# Patient Record
Sex: FEMALE | Race: WHITE | NOT HISPANIC OR LATINO
[De-identification: names, ages, dates, MRNs, and addresses within clinical notes are randomized per-mention and may not be internally consistent; named-entity substitution may affect disease eponyms.]

---

## 2017-03-31 ENCOUNTER — APPOINTMENT (OUTPATIENT)
Dept: ORTHOPEDIC SURGERY | Facility: CLINIC | Age: 82
End: 2017-03-31

## 2018-11-23 ENCOUNTER — TRANSCRIPTION ENCOUNTER (OUTPATIENT)
Age: 83
End: 2018-11-23

## 2019-03-08 ENCOUNTER — APPOINTMENT (OUTPATIENT)
Dept: ORTHOPEDIC SURGERY | Facility: CLINIC | Age: 84
End: 2019-03-08
Payer: MEDICARE

## 2019-03-08 VITALS — HEIGHT: 68 IN | WEIGHT: 195 LBS | BODY MASS INDEX: 29.55 KG/M2

## 2019-03-08 PROCEDURE — 20610 DRAIN/INJ JOINT/BURSA W/O US: CPT | Mod: RT

## 2019-03-08 PROCEDURE — 73562 X-RAY EXAM OF KNEE 3: CPT | Mod: RT

## 2019-03-08 PROCEDURE — 99214 OFFICE O/P EST MOD 30 MIN: CPT | Mod: 25

## 2020-02-28 ENCOUNTER — APPOINTMENT (OUTPATIENT)
Dept: ORTHOPEDIC SURGERY | Facility: CLINIC | Age: 85
End: 2020-02-28
Payer: MEDICARE

## 2020-02-28 VITALS — BODY MASS INDEX: 26.52 KG/M2 | HEIGHT: 68 IN | WEIGHT: 175 LBS

## 2020-02-28 VITALS — SYSTOLIC BLOOD PRESSURE: 162 MMHG | HEART RATE: 75 BPM | DIASTOLIC BLOOD PRESSURE: 81 MMHG

## 2020-02-28 DIAGNOSIS — M17.0 BILATERAL PRIMARY OSTEOARTHRITIS OF KNEE: ICD-10-CM

## 2020-02-28 PROCEDURE — 20610 DRAIN/INJ JOINT/BURSA W/O US: CPT | Mod: RT

## 2020-02-28 PROCEDURE — 99214 OFFICE O/P EST MOD 30 MIN: CPT | Mod: 25

## 2020-04-24 ENCOUNTER — APPOINTMENT (OUTPATIENT)
Dept: ORTHOPEDIC SURGERY | Facility: CLINIC | Age: 85
End: 2020-04-24

## 2022-02-18 ENCOUNTER — APPOINTMENT (OUTPATIENT)
Dept: GERIATRICS | Facility: CLINIC | Age: 87
End: 2022-02-18
Payer: MEDICARE

## 2022-02-18 ENCOUNTER — NON-APPOINTMENT (OUTPATIENT)
Age: 87
End: 2022-02-18

## 2022-02-18 VITALS
HEART RATE: 79 BPM | SYSTOLIC BLOOD PRESSURE: 130 MMHG | OXYGEN SATURATION: 97 % | DIASTOLIC BLOOD PRESSURE: 72 MMHG | BODY MASS INDEX: 24.59 KG/M2 | RESPIRATION RATE: 15 BRPM | WEIGHT: 153 LBS | HEIGHT: 66 IN | TEMPERATURE: 97.8 F

## 2022-02-18 DIAGNOSIS — Z13.820 ENCOUNTER FOR SCREENING FOR OSTEOPOROSIS: ICD-10-CM

## 2022-02-18 DIAGNOSIS — E53.8 DEFICIENCY OF OTHER SPECIFIED B GROUP VITAMINS: ICD-10-CM

## 2022-02-18 DIAGNOSIS — Z23 ENCOUNTER FOR IMMUNIZATION: ICD-10-CM

## 2022-02-18 DIAGNOSIS — E55.9 VITAMIN D DEFICIENCY, UNSPECIFIED: ICD-10-CM

## 2022-02-18 PROCEDURE — 99204 OFFICE O/P NEW MOD 45 MIN: CPT

## 2022-02-18 RX ORDER — RIVAROXABAN 20 MG/1
20 TABLET, FILM COATED ORAL
Qty: 30 | Refills: 2 | Status: ACTIVE | COMMUNITY
Start: 2022-02-18

## 2022-02-18 NOTE — HISTORY OF PRESENT ILLNESS
[FreeTextEntry1] : 86yof with pmhx of chb s/p ppm, chf, carotid artery stenosis, diabetes (diet controlle), hypothyroidism, OA of knees, afib who is seen for initial visit with her son and caregiver\par \par has HHA for morning \par lives with son\par \par needs assistance with medications \par tolieting, showering, dressing\par walks with walker and cane and has wheelchair for longer distances\par no recent falls\par \par worse strength and balance\par \par follows with cardiologist as Pcp\par \par no skin rashes\par \par urinary incontinence\par \par \par 1 son who is HCP: they have forms at home\par also has living will- but did not go into ACP discussion yet\par \par mother with stroke\par father with heart disease\par \par normal CBC last month\par normal TSH and t4\par 
119

## 2022-02-18 NOTE — PHYSICAL EXAM
[Alert] : alert [No Acute Distress] : in no acute distress [Sclera] : the sclera and conjunctiva were normal [EOMI] : extraocular movements were intact [No Neck Mass] : no neck mass was observed [Supple] : the neck was supple [Normal] : no respiratory distress, no accessory muscle use, normal respiratory rhythm and effort, lungs were clear to auscultation bilaterally [Normal S1, S2] : normal S1 and S2 [Murmurs] : no murmurs [Heart Rate And Rhythm] : heart rate was normal and rhythm regular [Edema] : edema was not present [Normal Appearance] : normal in appearance [Bowel Sounds] : normal bowel sounds [Abdomen Tenderness] : non-tender [Abdomen Soft] : soft [Cervical Lymph Nodes Enlarged Posterior Bilaterally] : posterior cervical [Supraclavicular Lymph Nodes Enlarged Bilaterally] : supraclavicular [Cervical Lymph Nodes Enlarged Anterior Bilaterally] : anterior cervical, supraclavicular [No Spinal Tenderness] : no spinal tenderness [Involuntary Movements] : no involuntary movements were seen [No Focal Deficits] : no focal deficits [Normal Affect] : the affect was normal [Normal Mood] : the mood was normal [de-identified] : impacted cerumen [de-identified] : varicose veins [de-identified] : PPM left chest wall [de-identified] : multiple attempts to stand, poor control with sitting, unsteady gait, needing 1 person assist [de-identified] : venous stasis changes

## 2022-02-18 NOTE — ASSESSMENT
[FreeTextEntry1] : vit D def:  repeat level\par check dexa (last 2014)\par no hx of fractures\par \par chf/afib/ PPM\par follows with cards\par euvolemic today\par c/w spironolactone, carvediolol and lasix\par \par hld: check lipids\par dm2: diet controlled last a1c 2022 6.1\par \par hx of folic acid def: check levels\par unsteady gait: PT referral ,  fall precautions\par \par f/u in july with memory testing

## 2022-02-18 NOTE — REVIEW OF SYSTEMS
[Fever] : no fever [Eyesight Problems] : no eyesight problems [Shortness Of Breath] : no shortness of breath [Cough] : no cough [SOB on Exertion] : no shortness of breath during exertion [Incontinence] : incontinence [As Noted in HPI] : as noted in HPI [Negative] : Gastrointestinal

## 2022-02-28 LAB
25(OH)D3 SERPL-MCNC: 54.9 NG/ML
ALBUMIN SERPL ELPH-MCNC: 4.3 G/DL
ALP BLD-CCNC: 103 U/L
ALT SERPL-CCNC: 16 U/L
ANION GAP SERPL CALC-SCNC: 12 MMOL/L
AST SERPL-CCNC: 19 U/L
BILIRUB SERPL-MCNC: 0.6 MG/DL
BUN SERPL-MCNC: 16 MG/DL
CALCIUM SERPL-MCNC: 10.4 MG/DL
CHLORIDE SERPL-SCNC: 101 MMOL/L
CHOLEST SERPL-MCNC: 197 MG/DL
CO2 SERPL-SCNC: 26 MMOL/L
CREAT SERPL-MCNC: 0.57 MG/DL
FOLATE SERPL-MCNC: >20 NG/ML
GLUCOSE SERPL-MCNC: 122 MG/DL
HDLC SERPL-MCNC: 49 MG/DL
LDLC SERPL CALC-MCNC: 135 MG/DL
NONHDLC SERPL-MCNC: 149 MG/DL
POTASSIUM SERPL-SCNC: 4.3 MMOL/L
PROT SERPL-MCNC: 7.1 G/DL
SODIUM SERPL-SCNC: 139 MMOL/L
TRIGL SERPL-MCNC: 69 MG/DL
VIT B12 SERPL-MCNC: 633 PG/ML

## 2022-04-04 LAB
ALBUMIN SERPL ELPH-MCNC: 4.3 G/DL
ALP BLD-CCNC: 101 U/L
ALT SERPL-CCNC: 13 U/L
ANION GAP SERPL CALC-SCNC: 11 MMOL/L
AST SERPL-CCNC: 17 U/L
BASOPHILS # BLD AUTO: 0.05 K/UL
BASOPHILS NFR BLD AUTO: 0.7 %
BILIRUB SERPL-MCNC: 0.6 MG/DL
BUN SERPL-MCNC: 13 MG/DL
CALCIUM SERPL-MCNC: 10.5 MG/DL
CHLORIDE SERPL-SCNC: 101 MMOL/L
CO2 SERPL-SCNC: 28 MMOL/L
CREAT SERPL-MCNC: 0.58 MG/DL
EGFR: 88 ML/MIN/1.73M2
EOSINOPHIL # BLD AUTO: 0.1 K/UL
EOSINOPHIL NFR BLD AUTO: 1.5 %
ESTIMATED AVERAGE GLUCOSE: 134 MG/DL
GLUCOSE SERPL-MCNC: 114 MG/DL
HBA1C MFR BLD HPLC: 6.3 %
HCT VFR BLD CALC: 44.9 %
HGB BLD-MCNC: 14.5 G/DL
IMM GRANULOCYTES NFR BLD AUTO: 1 %
LYMPHOCYTES # BLD AUTO: 1.21 K/UL
LYMPHOCYTES NFR BLD AUTO: 18 %
MAN DIFF?: NORMAL
MCHC RBC-ENTMCNC: 29.6 PG
MCHC RBC-ENTMCNC: 32.3 GM/DL
MCV RBC AUTO: 91.6 FL
MONOCYTES # BLD AUTO: 0.32 K/UL
MONOCYTES NFR BLD AUTO: 4.8 %
NEUTROPHILS # BLD AUTO: 4.97 K/UL
NEUTROPHILS NFR BLD AUTO: 74 %
PLATELET # BLD AUTO: 213 K/UL
POTASSIUM SERPL-SCNC: 4.3 MMOL/L
PROT SERPL-MCNC: 6.9 G/DL
RBC # BLD: 4.9 M/UL
RBC # FLD: 13.2 %
SODIUM SERPL-SCNC: 140 MMOL/L
TSH SERPL-ACNC: 1.23 UIU/ML
WBC # FLD AUTO: 6.72 K/UL

## 2022-04-08 ENCOUNTER — APPOINTMENT (OUTPATIENT)
Dept: GERIATRICS | Facility: CLINIC | Age: 87
End: 2022-04-08
Payer: MEDICARE

## 2022-04-08 VITALS
DIASTOLIC BLOOD PRESSURE: 80 MMHG | TEMPERATURE: 98 F | HEART RATE: 76 BPM | RESPIRATION RATE: 16 BRPM | BODY MASS INDEX: 23.95 KG/M2 | WEIGHT: 149 LBS | HEIGHT: 66 IN | OXYGEN SATURATION: 97 % | SYSTOLIC BLOOD PRESSURE: 138 MMHG

## 2022-04-08 DIAGNOSIS — Z95.0 PRESENCE OF CARDIAC PACEMAKER: ICD-10-CM

## 2022-04-08 PROCEDURE — 99214 OFFICE O/P EST MOD 30 MIN: CPT

## 2022-04-08 NOTE — HISTORY OF PRESENT ILLNESS
[FreeTextEntry1] : story of Present Illness\par 86yof with pmhx of chb s/p ppm, chf, carotid artery stenosis, diabetes (diet controlle), hypothyroidism, OA of knees, afib who is seen for initial visit with her son and caregiver\par \par has HHA for morning \par lives with son\par \par needs assistance with medications \par tolieting, showering, dressing\par walks with walker and cane and has wheelchair for longer distances\par no recent falls\par \par now working with PT twice weekly\par \par on home scale was large change in weight, but in office has had 4lb weight loss\par diet discussed-  very healthy diet with lots of veggies \par no ab pain, nausea, melena.\par \par labwork reviewed

## 2022-04-08 NOTE — PHYSICAL EXAM
[Normal] : alert, in no acute distress [Sclera] : the sclera and conjunctiva were normal [EOMI] : extraocular movements were intact [Normal Outer Ear/Nose] : the ears and nose were normal in appearance [Normal Appearance] : the appearance of the neck was normal [No Respiratory Distress] : no respiratory distress [No Acc Muscle Use] : no accessory muscle use [Respiration, Rhythm And Depth] : normal respiratory rhythm and effort [Auscultation Breath Sounds / Voice Sounds] : lungs were clear to auscultation bilaterally [Normal S1, S2] : normal S1 and S2 [Heart Rate And Rhythm] : heart rate was normal and rhythm regular [Edema] : edema was not present [Abdomen Tenderness] : non-tender [Abdomen Soft] : soft [No Clubbing, Cyanosis] : no clubbing or cyanosis of the fingernails [Involuntary Movements] : no involuntary movements were seen [Normal Color / Pigmentation] : normal skin color and pigmentation [No Focal Deficits] : no focal deficits [Normal Affect] : the affect was normal [Normal Mood] : the mood was normal [de-identified] : varicose veins [de-identified] : PPM to left chest

## 2022-04-08 NOTE — ASSESSMENT
[FreeTextEntry1] : dm2: diet controlled\par hld: diet controlled\par \par chf/afib/ PPM\par follows with cards\par euvolemic today\par c/w spironolactone, carvediolol and lasix\par \par f/u summer for memory testing\par

## 2022-04-08 NOTE — REVIEW OF SYSTEMS
Asthma    Bipolar Disorder    Borderline Personality Disorder    Cholelithiasis    Depression    Fibroids    GERD (Gastroesophageal Reflux Disease)    Obesity [Fever] : no fever [Cough] : no cough [Abdominal Pain] : no abdominal pain

## 2022-06-17 ENCOUNTER — APPOINTMENT (OUTPATIENT)
Dept: GERIATRICS | Facility: CLINIC | Age: 87
End: 2022-06-17
Payer: MEDICARE

## 2022-06-17 VITALS
SYSTOLIC BLOOD PRESSURE: 132 MMHG | TEMPERATURE: 97.7 F | DIASTOLIC BLOOD PRESSURE: 80 MMHG | WEIGHT: 145 LBS | OXYGEN SATURATION: 99 % | BODY MASS INDEX: 23.4 KG/M2 | HEART RATE: 66 BPM | RESPIRATION RATE: 15 BRPM

## 2022-06-17 PROCEDURE — 99214 OFFICE O/P EST MOD 30 MIN: CPT

## 2022-06-17 NOTE — PHYSICAL EXAM
[Alert] : alert [Sclera] : the sclera and conjunctiva were normal [EOMI] : extraocular movements were intact [Normal Outer Ear/Nose] : the ears and nose were normal in appearance [No Neck Mass] : no neck mass was observed [Supple] : the neck was supple [Normal] : no respiratory distress, no accessory muscle use, normal respiratory rhythm and effort, lungs were clear to auscultation bilaterally [Normal S1, S2] : normal S1 and S2 [Murmurs] : no murmurs [Heart Rate And Rhythm] : heart rate was normal and rhythm regular [Edema] : edema was not present [Normal Appearance] : normal in appearance [Bowel Sounds] : normal bowel sounds [Abdomen Tenderness] : non-tender [Abdomen Soft] : soft [Cervical Lymph Nodes Enlarged Posterior Bilaterally] : posterior cervical [Supraclavicular Lymph Nodes Enlarged Bilaterally] : supraclavicular [Cervical Lymph Nodes Enlarged Anterior Bilaterally] : anterior cervical, supraclavicular [No Spinal Tenderness] : no spinal tenderness [Involuntary Movements] : no involuntary movements were seen [No Focal Deficits] : no focal deficits [Normal Affect] : the affect was normal [Normal Mood] : the mood was normal [de-identified] : PPM left chest wall [de-identified] : varicose veins [de-identified] : walker [de-identified] : venous stasis changes

## 2022-06-17 NOTE — PHYSICAL EXAM
[Alert] : alert [Sclera] : the sclera and conjunctiva were normal [EOMI] : extraocular movements were intact [Normal Outer Ear/Nose] : the ears and nose were normal in appearance [No Neck Mass] : no neck mass was observed [Supple] : the neck was supple [Normal] : no respiratory distress, no accessory muscle use, normal respiratory rhythm and effort, lungs were clear to auscultation bilaterally [Normal S1, S2] : normal S1 and S2 [Murmurs] : no murmurs [Heart Rate And Rhythm] : heart rate was normal and rhythm regular [Edema] : edema was not present [Normal Appearance] : normal in appearance [Bowel Sounds] : normal bowel sounds [Abdomen Tenderness] : non-tender [Abdomen Soft] : soft [Cervical Lymph Nodes Enlarged Posterior Bilaterally] : posterior cervical [Supraclavicular Lymph Nodes Enlarged Bilaterally] : supraclavicular [Cervical Lymph Nodes Enlarged Anterior Bilaterally] : anterior cervical, supraclavicular [No Spinal Tenderness] : no spinal tenderness [Involuntary Movements] : no involuntary movements were seen [No Focal Deficits] : no focal deficits [Normal Affect] : the affect was normal [Normal Mood] : the mood was normal [de-identified] : PPM left chest wall [de-identified] : varicose veins [de-identified] : walker [de-identified] : venous stasis changes

## 2022-06-17 NOTE — HISTORY OF PRESENT ILLNESS
[FreeTextEntry1] : 86yof with pmhx of chb s/p ppm, chf, carotid artery stenosis, diabetes (diet controlle), hypothyroidism, OA of knees, afib who is seen for initial visit with her son and caregiver\par \par Dementia: \par SLUMs 9/30 6/2022\par CTH : pending\par normal mood, sleeping okay, good appetite\par \par \par has HHA for morning \par lives with son\par \par needs assistance with medications  and ADL's\par walks with walker and cane and has wheelchair for longer distances\par no recent falls\par \par working with PT\par \par follows with cardiologist as Pcp\par \par no skin rashes\par \par urinary incontinence using A&D\par \par \par 1 son who is HCP: they have forms at home\par also has living will- but did not go into ACP discussion yet\par \par \par

## 2022-06-17 NOTE — ASSESSMENT
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

## 2022-06-21 ENCOUNTER — LABORATORY RESULT (OUTPATIENT)
Age: 87
End: 2022-06-21

## 2022-06-21 LAB
APPEARANCE: CLEAR
BILIRUBIN URINE: NEGATIVE
BLOOD URINE: NEGATIVE
COLOR: NORMAL
GLUCOSE QUALITATIVE U: NEGATIVE
KETONES URINE: ABNORMAL
LEUKOCYTE ESTERASE URINE: ABNORMAL
NITRITE URINE: NEGATIVE
PH URINE: 6
PROTEIN URINE: NEGATIVE
SPECIFIC GRAVITY URINE: 1.01
UROBILINOGEN URINE: NORMAL

## 2022-06-23 ENCOUNTER — APPOINTMENT (OUTPATIENT)
Dept: CT IMAGING | Facility: IMAGING CENTER | Age: 87
End: 2022-06-23
Payer: MEDICARE

## 2022-06-23 ENCOUNTER — OUTPATIENT (OUTPATIENT)
Dept: OUTPATIENT SERVICES | Facility: HOSPITAL | Age: 87
LOS: 1 days | End: 2022-06-23
Payer: MEDICARE

## 2022-06-23 DIAGNOSIS — R41.3 OTHER AMNESIA: ICD-10-CM

## 2022-06-23 PROCEDURE — 70450 CT HEAD/BRAIN W/O DYE: CPT | Mod: 26,MH

## 2022-06-23 PROCEDURE — 70450 CT HEAD/BRAIN W/O DYE: CPT

## 2022-09-02 ENCOUNTER — APPOINTMENT (OUTPATIENT)
Dept: GERIATRICS | Facility: CLINIC | Age: 87
End: 2022-09-02

## 2022-09-02 VITALS
RESPIRATION RATE: 16 BRPM | BODY MASS INDEX: 21.41 KG/M2 | HEIGHT: 67.2 IN | OXYGEN SATURATION: 99 % | HEART RATE: 66 BPM | DIASTOLIC BLOOD PRESSURE: 70 MMHG | WEIGHT: 138 LBS | SYSTOLIC BLOOD PRESSURE: 122 MMHG

## 2022-09-02 PROCEDURE — 99214 OFFICE O/P EST MOD 30 MIN: CPT

## 2022-09-02 NOTE — HISTORY OF PRESENT ILLNESS
[FreeTextEntry1] : 87yof with pmhx of chb s/p ppm, chf, carotid artery stenosis, diabetes (diet controlled), hypothyroidism, OA of knees, afib who is seen for initial visit with her son and caregiver\par \par home bp raedings with some elevations\par adherent with medications\par \par Dementia: \par SLUMs 9/30 6/2022\par has aide during day, now also on weekends\par episode of getting up in middle of night disoriented and turned on stove to make tea-\par overall though sleeping well without major sleep disturbances or behaviorial issues\par \par has HHA \par lives with son\par \par needs assistance with medications  and ADL's\par walks with walker and cane and has wheelchair for longer distances\par no recent falls\par \par working with PT\par \par no skin rashes\par \par urinary incontinence using A&D\par \par \par 1 son who is HCP: they have forms at home\par also has living will- but did not go into ACP discussion yet\par \par \par

## 2022-09-02 NOTE — ASSESSMENT
[FreeTextEntry1] : htn:\par home bp readings with some elevations\par today controlled\par advised if home bp readings elevated to increase carvedilool to 6.25mg BID\par \par memory loss:\par suspect alz dementia\par 9/30 SLUMs 6/2022\par c/w home supportive care\par needs assistance with ADL's and IADL;s\par son will be going on trip out of country- asking about respite care- referral to \par \par c/w PT  at home\par \par dm2: diet controlled\par hld: diet controlled\par \par chf/afib/ PPM\par stable\par euvolemic today\par c/w spironolactone, carvediolol and lasix\par \par f/u 3 months-  dementia -\par \par \par

## 2022-09-02 NOTE — PHYSICAL EXAM
[Alert] : alert [Sclera] : the sclera and conjunctiva were normal [EOMI] : extraocular movements were intact [Normal Outer Ear/Nose] : the ears and nose were normal in appearance [No Neck Mass] : no neck mass was observed [Supple] : the neck was supple [Normal] : no respiratory distress, no accessory muscle use, normal respiratory rhythm and effort, lungs were clear to auscultation bilaterally [Normal S1, S2] : normal S1 and S2 [Murmurs] : no murmurs [Heart Rate And Rhythm] : heart rate was normal and rhythm regular [Edema] : edema was not present [Normal Appearance] : normal in appearance [Bowel Sounds] : normal bowel sounds [Abdomen Tenderness] : non-tender [Abdomen Soft] : soft [Cervical Lymph Nodes Enlarged Posterior Bilaterally] : posterior cervical [Supraclavicular Lymph Nodes Enlarged Bilaterally] : supraclavicular [Cervical Lymph Nodes Enlarged Anterior Bilaterally] : anterior cervical, supraclavicular [No Spinal Tenderness] : no spinal tenderness [Involuntary Movements] : no involuntary movements were seen [No Focal Deficits] : no focal deficits [Normal Affect] : the affect was normal [Normal Mood] : the mood was normal [de-identified] : PPM left chest wall [de-identified] : varicose veins [de-identified] : walker [de-identified] : venous stasis changes

## 2022-09-02 NOTE — REVIEW OF SYSTEMS
[Incontinence] : incontinence [As Noted in HPI] : as noted in HPI [Negative] : Gastrointestinal [Fever] : no fever [Eyesight Problems] : no eyesight problems [Shortness Of Breath] : no shortness of breath [Cough] : no cough [SOB on Exertion] : no shortness of breath during exertion

## 2022-09-12 ENCOUNTER — NON-APPOINTMENT (OUTPATIENT)
Age: 87
End: 2022-09-12

## 2022-09-16 ENCOUNTER — APPOINTMENT (OUTPATIENT)
Dept: GERIATRICS | Facility: CLINIC | Age: 87
End: 2022-09-16

## 2022-11-01 ENCOUNTER — OUTPATIENT (OUTPATIENT)
Dept: OUTPATIENT SERVICES | Facility: HOSPITAL | Age: 87
LOS: 1 days | End: 2022-11-01
Payer: MEDICARE

## 2022-11-01 ENCOUNTER — APPOINTMENT (OUTPATIENT)
Dept: GERIATRICS | Facility: CLINIC | Age: 87
End: 2022-11-01

## 2022-11-01 ENCOUNTER — APPOINTMENT (OUTPATIENT)
Dept: ULTRASOUND IMAGING | Facility: IMAGING CENTER | Age: 87
End: 2022-11-01

## 2022-11-01 VITALS
RESPIRATION RATE: 16 BRPM | WEIGHT: 140.38 LBS | OXYGEN SATURATION: 99 % | TEMPERATURE: 97.2 F | HEIGHT: 67 IN | BODY MASS INDEX: 22.03 KG/M2 | HEART RATE: 65 BPM | DIASTOLIC BLOOD PRESSURE: 80 MMHG | SYSTOLIC BLOOD PRESSURE: 138 MMHG

## 2022-11-01 DIAGNOSIS — M79.89 OTHER SPECIFIED SOFT TISSUE DISORDERS: ICD-10-CM

## 2022-11-01 PROCEDURE — 99215 OFFICE O/P EST HI 40 MIN: CPT

## 2022-11-01 PROCEDURE — 93971 EXTREMITY STUDY: CPT

## 2022-11-01 PROCEDURE — 93971 EXTREMITY STUDY: CPT | Mod: 26,LT

## 2022-11-03 LAB
25(OH)D3 SERPL-MCNC: 63.9 NG/ML
ALBUMIN SERPL ELPH-MCNC: 4.2 G/DL
ALP BLD-CCNC: 124 U/L
ALT SERPL-CCNC: 17 U/L
ANION GAP SERPL CALC-SCNC: 15 MMOL/L
AST SERPL-CCNC: 24 U/L
BASOPHILS # BLD AUTO: 0.08 K/UL
BASOPHILS NFR BLD AUTO: 0.9 %
BILIRUB SERPL-MCNC: 0.3 MG/DL
BUN SERPL-MCNC: 14 MG/DL
CALCIUM SERPL-MCNC: 10.5 MG/DL
CHLORIDE SERPL-SCNC: 97 MMOL/L
CHOLEST SERPL-MCNC: 190 MG/DL
CO2 SERPL-SCNC: 24 MMOL/L
CREAT SERPL-MCNC: 0.54 MG/DL
EGFR: 90 ML/MIN/1.73M2
EOSINOPHIL # BLD AUTO: 0.13 K/UL
EOSINOPHIL NFR BLD AUTO: 1.5 %
ESTIMATED AVERAGE GLUCOSE: 120 MG/DL
FOLATE SERPL-MCNC: >20 NG/ML
GLUCOSE SERPL-MCNC: 70 MG/DL
HBA1C MFR BLD HPLC: 5.8 %
HCT VFR BLD CALC: 44.2 %
HDLC SERPL-MCNC: 52 MG/DL
HGB BLD-MCNC: 14.3 G/DL
IMM GRANULOCYTES NFR BLD AUTO: 1.5 %
INR PPP: 1.28 RATIO
LDLC SERPL CALC-MCNC: 126 MG/DL
LYMPHOCYTES # BLD AUTO: 1.33 K/UL
LYMPHOCYTES NFR BLD AUTO: 15.2 %
MAN DIFF?: NORMAL
MCHC RBC-ENTMCNC: 30 PG
MCHC RBC-ENTMCNC: 32.4 GM/DL
MCV RBC AUTO: 92.7 FL
MONOCYTES # BLD AUTO: 0.41 K/UL
MONOCYTES NFR BLD AUTO: 4.7 %
NEUTROPHILS # BLD AUTO: 6.66 K/UL
NEUTROPHILS NFR BLD AUTO: 76.2 %
NONHDLC SERPL-MCNC: 139 MG/DL
PLATELET # BLD AUTO: 228 K/UL
POTASSIUM SERPL-SCNC: 4.9 MMOL/L
PROT SERPL-MCNC: 7 G/DL
PT BLD: 15.2 SEC
RBC # BLD: 4.77 M/UL
RBC # FLD: 13.2 %
SODIUM SERPL-SCNC: 136 MMOL/L
TRIGL SERPL-MCNC: 65 MG/DL
TSH SERPL-ACNC: 1.84 UIU/ML
VIT B12 SERPL-MCNC: 741 PG/ML
WBC # FLD AUTO: 8.74 K/UL

## 2022-11-04 PROBLEM — M79.89 LEFT ARM SWELLING: Status: ACTIVE | Noted: 2022-11-01

## 2022-11-04 NOTE — HISTORY OF PRESENT ILLNESS
[FreeTextEntry1] : CATHERINE KAYE is an 88 yo woman with PMH of dementia, CHB s/p PPM, CHF, carotid artery stenosis. diabetes (diet controlled), hypothyroidism, OA of knees, and afib who presents for acute visit to evaluate left arm swelling. Patient is accompanied by her son, Tian and her caregiver who provide collateral history. \par \par Left arm swelling:\par -noted about 1 weeks ago\par -bruise around her elbow that appears to be healing \par -does has swelling that extends from elbow to mid forearm\par -denies pain at area of injury \par -denies recent falls or injury\par -patient has HHA throughout the day and is with son at night\par -patient gets up at night to use the bathroom, etc and is unattended at that time; unsure if patient had unwitnessed injury \par \par Otherwise, patient has been ok. No recent urgent care visits or hospitalizations. She is planning to have PPM replacement later this week.

## 2022-11-04 NOTE — ASSESSMENT
[FreeTextEntry1] : f/u with Dr. Jack on 12/2; available earlier PRN\par \par Mackenzie Mar, JANNETTEP-BC

## 2022-11-04 NOTE — PHYSICAL EXAM
[Alert] : alert [No Acute Distress] : in no acute distress [Sclera] : the sclera and conjunctiva were normal [EOMI] : extraocular movements were intact [PERRL] : pupils were equal in size, round, and reactive to light [Normal Oral Mucosa] : normal oral mucosa [Normal Outer Ear/Nose] : the ears and nose were normal in appearance [Both Tympanic Membranes Were Examined] : both tympanic membranes were normal [Normal Appearance] : the appearance of the neck was normal [Supple] : the neck was supple [No Respiratory Distress] : no respiratory distress [No Acc Muscle Use] : no accessory muscle use [Respiration, Rhythm And Depth] : normal respiratory rhythm and effort [Auscultation Breath Sounds / Voice Sounds] : lungs were clear to auscultation bilaterally [Normal S1, S2] : normal S1 and S2 [Heart Rate And Rhythm] : heart rate was normal and rhythm regular [Edema] : edema was not present [Pedal Pulses Normal] : the pedal pulses are present [Bowel Sounds] : normal bowel sounds [Abdomen Tenderness] : non-tender [Abdomen Soft] : soft [Cervical Lymph Nodes Enlarged Posterior Bilaterally] : posterior cervical [Cervical Lymph Nodes Enlarged Anterior Bilaterally] : anterior cervical, supraclavicular [No CVA Tenderness] : no CVA  tenderness [No Spinal Tenderness] : no spinal tenderness [Normal Gait] : abnormal gait [Motor Tone] : muscle strength and tone were normal [No Focal Deficits] : no focal deficits [Normal Affect] : the affect was normal [Normal Mood] : the mood was normal [de-identified] : left arm with healing bruise around her elbow and swelling extending to forearm; not painful

## 2022-11-04 NOTE — REASON FOR VISIT
[Acute] : an acute visit [Formal Caregiver] : formal caregiver [Family Member] : family member [FreeTextEntry1] : left arm swelling [FreeTextEntry3] : Tian almonte

## 2022-11-04 NOTE — REVIEW OF SYSTEMS
[Feeling Poorly] : not feeling poorly [Feeling Tired] : not feeling tired [Discharge From Eyes] : no purulent discharge from the eyes [Dry Eyes] : no dryness of the eyes [Nasal Discharge] : no nasal discharge [Sore Throat] : no sore throat [Chest Pain] : no chest pain [Palpitations] : no palpitations [Shortness Of Breath] : no shortness of breath [Wheezing] : no wheezing [Cough] : no cough [SOB on Exertion] : no shortness of breath during exertion [Abdominal Pain] : no abdominal pain [Vomiting] : no vomiting [Constipation] : no constipation [Diarrhea] : no diarrhea [Dysuria] : no dysuria [Limb Pain] : no limb pain [Skin Wound] : no skin wound [Dizziness] : no dizziness [Anxiety] : no anxiety [Depression] : no depression

## 2022-12-02 ENCOUNTER — APPOINTMENT (OUTPATIENT)
Dept: GERIATRICS | Facility: CLINIC | Age: 87
End: 2022-12-02

## 2022-12-02 VITALS
TEMPERATURE: 97.9 F | DIASTOLIC BLOOD PRESSURE: 74 MMHG | RESPIRATION RATE: 16 BRPM | WEIGHT: 137 LBS | HEART RATE: 78 BPM | OXYGEN SATURATION: 99 % | SYSTOLIC BLOOD PRESSURE: 112 MMHG | BODY MASS INDEX: 21.46 KG/M2

## 2022-12-02 DIAGNOSIS — H61.20 IMPACTED CERUMEN, UNSPECIFIED EAR: ICD-10-CM

## 2022-12-02 DIAGNOSIS — R41.3 OTHER AMNESIA: ICD-10-CM

## 2022-12-02 DIAGNOSIS — M67.912 UNSPECIFIED DISORDER OF SYNOVIUM AND TENDON, LEFT SHOULDER: ICD-10-CM

## 2022-12-02 PROCEDURE — 99214 OFFICE O/P EST MOD 30 MIN: CPT

## 2022-12-02 NOTE — ASSESSMENT
[FreeTextEntry1] : htn:\par controlled\par c/w carvediolol\par \par memory loss:\par 9/30 SLUMs 6/2022\par MMSE 12/2022 24/30\par c/w home supportive care\par needs assistance with ADL's and IADL;s\par \par left shoulder rotator cuff pathology:\par will hold off on imaging\par plan for OT at home\par no pain\par \par dm2: diet controlled\par hld: diet controlled\par \par chf/afib/ PPM\par stable\par euvolemic today\par c/w spironolactone, carvediolol and lasix\par \par rotator cuff pathology:\par OT referral\par \par \par \par \par

## 2022-12-02 NOTE — HISTORY OF PRESENT ILLNESS
[FreeTextEntry1] : 87yof with pmhx of chb s/p ppm, chf, carotid artery stenosis, diabetes (diet controlled), hypothyroidism, OA of knees, afib who is seen for follow up visit with her son and caregiver\par \par since last fall on the left arm, she has had some ongoing issues with limited active rom, no pain.\par working with PT, but unable to cut her food, needs helps dressing\par no swelling\par \par cold feet with venous stasis changes of her lower legs\par denies wounds or edema\par \par htn: \par adherent with medications\par \par Dementia: \par SLUMs 9/30 6/2022\par SLUMs 12/2022\par has aide during day, now also on weekends\par episode of getting up in middle of night disoriented and turned on stove to make tea-\par overall though sleeping well without major sleep disturbances or behavioral issues\par \par has HHA \par lives with son\par \par needs assistance with medications  and ADL's\par walks with walker and cane and has wheelchair for longer distances\par \par working with PT\par \par no skin rashes\par \par urinary incontinence using A&D\par \par \par 1 son who is HCP: they have forms at home\par also has living will- but did not go into ACP discussion yet\par \par \par

## 2022-12-02 NOTE — PHYSICAL EXAM
[Alert] : alert [Sclera] : the sclera and conjunctiva were normal [EOMI] : extraocular movements were intact [Normal Outer Ear/Nose] : the ears and nose were normal in appearance [No Neck Mass] : no neck mass was observed [Supple] : the neck was supple [Normal] : no respiratory distress, no accessory muscle use, normal respiratory rhythm and effort, lungs were clear to auscultation bilaterally [Normal S1, S2] : normal S1 and S2 [Murmurs] : no murmurs [Heart Rate And Rhythm] : heart rate was normal and rhythm regular [Edema] : edema was not present [Normal Appearance] : normal in appearance [Bowel Sounds] : normal bowel sounds [Abdomen Tenderness] : non-tender [Abdomen Soft] : soft [Cervical Lymph Nodes Enlarged Posterior Bilaterally] : posterior cervical [Supraclavicular Lymph Nodes Enlarged Bilaterally] : supraclavicular [Cervical Lymph Nodes Enlarged Anterior Bilaterally] : anterior cervical, supraclavicular [No Spinal Tenderness] : no spinal tenderness [Involuntary Movements] : no involuntary movements were seen [No Focal Deficits] : no focal deficits [Normal Affect] : the affect was normal [Normal Mood] : the mood was normal [de-identified] : impacted cerumen bilaterally [de-identified] : PPM left chest wall [de-identified] : varicose veins, reduced pedal pulses [de-identified] : walker [de-identified] : venous stasis changes

## 2023-03-16 ENCOUNTER — APPOINTMENT (OUTPATIENT)
Dept: GERIATRICS | Facility: CLINIC | Age: 88
End: 2023-03-16
Payer: MEDICARE

## 2023-03-16 ENCOUNTER — NON-APPOINTMENT (OUTPATIENT)
Age: 88
End: 2023-03-16

## 2023-03-16 VITALS
TEMPERATURE: 97 F | WEIGHT: 134 LBS | DIASTOLIC BLOOD PRESSURE: 80 MMHG | OXYGEN SATURATION: 96 % | BODY MASS INDEX: 21.03 KG/M2 | SYSTOLIC BLOOD PRESSURE: 132 MMHG | RESPIRATION RATE: 16 BRPM | HEART RATE: 68 BPM | HEIGHT: 67 IN

## 2023-03-16 DIAGNOSIS — E11.9 TYPE 2 DIABETES MELLITUS W/OUT COMPLICATIONS: ICD-10-CM

## 2023-03-16 PROCEDURE — 99214 OFFICE O/P EST MOD 30 MIN: CPT

## 2023-03-16 NOTE — HISTORY OF PRESENT ILLNESS
[Any fall with injury in past year] : Patient reported fall with injury in the past year [FreeTextEntry1] : 87yof with pmhx of chb s/p ppm, chf, carotid artery stenosis, diabetes (diet controlled), hypothyroidism, OA of knees, afib who is seen for follow up visit with her son.\par \par htn: \par adherent with medications\par denies chest pain or palpitations\par \par Dementia: \par SLUMs 9/30 6/2022\par SLUMs 12/2022\par has aide during day, now also on weekends, looking into getting at night\par few episodes of disorientation during night\par overall though sleeping well without major sleep disturbances or behavioral issues\par \par has HHA \par lives with son\par \par needs assistance with medications  and ADL's\par walks with walker and cane and has wheelchair for longer distances\par \par working with PT\par \par no skin rashes\par \par urinary incontinence using A&D\par \par \par 1 son who is HCP: they have forms at home\par also has living will- advised to bring in copy to further discuss Advance directives \par \par \par

## 2023-03-16 NOTE — ASSESSMENT
[FreeTextEntry1] : htn:\par controlled\par c/w carvedilol\par \par Dementia: likely Alzheimer's dementia\par 9/30 SLUMs 6/2022\par MMSE 12/2022 24/30\par c/w home supportive care-  looking into starting over night aides\par needs assistance with ADL's and IADL;s\par \par left shoulder rotator cuff pathology:\par cw home OT \par \par dm2: diet controlled\par hld: diet controlled\par \par chf/afib/ PPM\par stable\par euvolemic today\par c/w spironolactone, carvedilol and lasix\par \par \par \par

## 2023-03-16 NOTE — PHYSICAL EXAM
[Alert] : alert [Sclera] : the sclera and conjunctiva were normal [EOMI] : extraocular movements were intact [Normal Outer Ear/Nose] : the ears and nose were normal in appearance [No Neck Mass] : no neck mass was observed [Supple] : the neck was supple [Normal] : no respiratory distress, no accessory muscle use, normal respiratory rhythm and effort, lungs were clear to auscultation bilaterally [Normal S1, S2] : normal S1 and S2 [Murmurs] : no murmurs [Heart Rate And Rhythm] : heart rate was normal and rhythm regular [Edema] : edema was not present [Normal Appearance] : normal in appearance [Bowel Sounds] : normal bowel sounds [Abdomen Tenderness] : non-tender [Abdomen Soft] : soft [Cervical Lymph Nodes Enlarged Posterior Bilaterally] : posterior cervical [Supraclavicular Lymph Nodes Enlarged Bilaterally] : supraclavicular [Cervical Lymph Nodes Enlarged Anterior Bilaterally] : anterior cervical, supraclavicular [No Spinal Tenderness] : no spinal tenderness [Involuntary Movements] : no involuntary movements were seen [No Focal Deficits] : no focal deficits [Normal Affect] : the affect was normal [Normal Mood] : the mood was normal [de-identified] : impacted cerumen bilaterally [de-identified] : PPM left chest wall [de-identified] : varicose veins [de-identified] : walker [de-identified] : venous stasis changes

## 2023-03-30 ENCOUNTER — EMERGENCY (EMERGENCY)
Facility: HOSPITAL | Age: 88
LOS: 1 days | Discharge: HOME | End: 2023-03-30
Attending: EMERGENCY MEDICINE
Payer: MEDICARE

## 2023-03-30 VITALS
DIASTOLIC BLOOD PRESSURE: 73 MMHG | HEART RATE: 99 BPM | RESPIRATION RATE: 18 BRPM | SYSTOLIC BLOOD PRESSURE: 112 MMHG | OXYGEN SATURATION: 95 %

## 2023-03-30 PROCEDURE — 99283 EMERGENCY DEPT VISIT LOW MDM: CPT | Mod: 25

## 2023-03-30 PROCEDURE — 85027 COMPLETE CBC AUTOMATED: CPT

## 2023-03-30 PROCEDURE — 99284 EMERGENCY DEPT VISIT MOD MDM: CPT | Mod: 25,GC

## 2023-03-30 PROCEDURE — 12001 RPR S/N/AX/GEN/TRNK 2.5CM/<: CPT

## 2023-03-30 NOTE — ED PROVIDER NOTE - CLINICAL SUMMARY MEDICAL DECISION MAKING FREE TEXT BOX
Patient presents with oozing from wound where she had a cyst removed yesterday.  Patient is denying any symptoms of acute blood loss anemia and has stable vital signs.  Bleeding not controlled with direct pressure, thus decision was made to put 2 stitches on the wound to help stop bleeding.  Risk versus benefit of closing the wound were discussed with patient and her family and we agreed this was the best option as opposed to letting it continue to bleed.  Patient had significant improvement of the bleeding with this as well as with TXA soaked gauze over it.  Patient's hemoglobin is stable.  Patient observed in the ED to ensure he did not rebleed.  Patient is safe for discharge home with follow-up with her surgeon as an outpatient for reevaluation.  Patient given return precautions.

## 2023-03-30 NOTE — ED PROVIDER NOTE - PROGRESS NOTE DETAILS
Hemoglobin within normal limits.  Patient's wound is no longer bleeding.  Patient is otherwise well-appearing, ambulating without becoming short of breath or dizzy.  Patient is safe for discharge home with wound care instructions, follow-up with her surgeon in the short-term.

## 2023-03-30 NOTE — ED PROVIDER NOTE - PHYSICAL EXAMINATION
GEN: Well Appearing, Nontoxic, NAD  HEENT: NC/AT, MMM  Neck: supple  CV: reg rate  RESP: normal WOB, no distress  ABD: non-distended, non-tender  EXT/MSK:  FROMx4. No lower extremity edema.  SKIN: 1 cm wound to R mid abdomen, oozing blood  Neuro: Grossly intact, AOX3

## 2023-03-30 NOTE — ED PROCEDURE NOTE - SKIN SUTURE TYPE
Brigitte Moya called requesting a refill on the following medications:  Requested Prescriptions     Pending Prescriptions Disp Refills    sertraline (ZOLOFT) 100 MG tablet 30 tablet 5     Sig: Take 1 tablet by mouth daily     Pharmacy verified:  [email or web address]      Date of last visit: 12/27/2017  Date of next visit (if applicable): Visit date not found gut/chromic

## 2023-03-30 NOTE — ED PROVIDER NOTE - PATIENT PORTAL LINK FT
You can access the FollowMyHealth Patient Portal offered by [facility] by registering at the following website: [email or web address]. By joining [facility]’s FollowMyHealth portal, you will also be able to view your health information using other applications (apps) compatible with our system.

## 2023-03-30 NOTE — ED PROVIDER NOTE - OBJECTIVE STATEMENT
Telephone Encounter by Maryan Mcdermott MA at 05/16/17 04:03 PM     Author:  Maryan Mcdermott MA Service:  (none) Author Type:  Medical Assistant     Filed:  05/16/17 04:03 PM Encounter Date:  5/14/2017 Status:  Signed     :  Maryan Mcdermott MA (Medical Assistant)            Rx sent to pharmacy.[AZ1.1T]        Revision History        User Key Date/Time User Provider Type Action    > AZ1.1 05/16/17 04:03 PM Maryan Mcdermott MA Medical Assistant Sign    T - Template            
Telephone Encounter by Maryan Mcdermott MA at 05/16/17 04:03 PM     Author:  Maryan Mcdermott MA Service:  (none) Author Type:  Medical Assistant     Filed:  05/16/17 04:03 PM Encounter Date:  5/14/2017 Status:  Signed     :  Maryan Mcdermott MA (Medical Assistant)       From: Agustin Jeffrey  To: Hamzah Soto MD  Sent: 5/14/2017  7:25 PM CDT  Subject: Medication Renewal Request    Original authorizing provider: MD Agustin Davalos would like a refill of the following medications:  Insulin Pen Needle (BD PEN NEEDLE CASE U/F) 32G X 4 MM MISC [Hamzah Soto MD]    Preferred pharmacy: [facility] ([facility])    Comment:         Revision History        Date/Time User Provider Type Action    > 05/16/17 04:03 PM Maryan Mcdermott MA Medical Assistant Sign    Attribution information within the note text is not available.            
88-year-old female comes in with bleeding from wound.  Patient had an excision of an abdominal wall cyst at an outpatient surgery center 2 days ago.  She had been recovering well without any bleeding from it.  Tonight, while at a dinner party for her birthday, she raises her arm and saw that there was blood covering her clothes.  Her family noted that she was bleeding from the wound so EMS was called right away.  Patient is denying any pain at the site, and denies any shortness of breath, fatigue, chest pain.  She has no other complaints.

## 2023-04-03 ENCOUNTER — APPOINTMENT (OUTPATIENT)
Dept: GERIATRICS | Facility: CLINIC | Age: 88
End: 2023-04-03
Payer: MEDICARE

## 2023-04-03 VITALS
BODY MASS INDEX: 25.74 KG/M2 | HEART RATE: 83 BPM | SYSTOLIC BLOOD PRESSURE: 102 MMHG | WEIGHT: 164 LBS | OXYGEN SATURATION: 97 % | DIASTOLIC BLOOD PRESSURE: 62 MMHG | TEMPERATURE: 98.4 F | RESPIRATION RATE: 14 BRPM | HEIGHT: 67 IN

## 2023-04-03 DIAGNOSIS — T81.31XA DISRUPTION OF EXTERNAL OPERATION (SURGICAL) WOUND, NOT ELSEWHERE CLASSIFIED, INITIAL ENCOUNTER: ICD-10-CM

## 2023-04-03 DIAGNOSIS — I50.9 HEART FAILURE, UNSPECIFIED: ICD-10-CM

## 2023-04-03 PROCEDURE — 99214 OFFICE O/P EST MOD 30 MIN: CPT

## 2023-04-03 NOTE — HISTORY OF PRESENT ILLNESS
[FreeTextEntry1] : 88yoF seen for follow up after went to ER on 3/30 for bleeding wound.  Patient had an excision of an abdominal wall cyst at an outpatient at urgent center on 3/28.\par in ER 2 stitches were placed.\par she denies any discharge or bleeding from site.  hood pain.\par has been holding NOAC.\par they also noticed lower bp over weekend and she has been feeling tired.\par not drinking enough \par aide and son are providing wound care at home.\par \par \par \par

## 2023-04-03 NOTE — PHYSICAL EXAM
[Alert] : alert [No Acute Distress] : in no acute distress [Sclera] : the sclera and conjunctiva were normal [EOMI] : extraocular movements were intact [Normal Appearance] : the appearance of the neck was normal [No Respiratory Distress] : no respiratory distress [No Acc Muscle Use] : no accessory muscle use [Respiration, Rhythm And Depth] : normal respiratory rhythm and effort [Involuntary Movements] : no involuntary movements were seen [de-identified] : right lateral wall with clean suture site with intact sutures.  no discharge or signs of infection or bleeding

## 2023-04-03 NOTE — ASSESSMENT
[FreeTextEntry1] : s/p cyst removal on right lateral abdominal wall: now with 2 stitches\par no bleeding or pain\par no signs of infection\par c/w wound care.\par \par afib:  advised may resume NOAC\par \par chf: bp on lower side today\par advised to hold lasix this week and monitor bp\par \par check labwork at home next week

## 2023-04-05 ENCOUNTER — LABORATORY RESULT (OUTPATIENT)
Age: 88
End: 2023-04-05

## 2023-04-06 ENCOUNTER — LABORATORY RESULT (OUTPATIENT)
Age: 88
End: 2023-04-06

## 2023-04-06 ENCOUNTER — NON-APPOINTMENT (OUTPATIENT)
Age: 88
End: 2023-04-06

## 2023-04-10 ENCOUNTER — NON-APPOINTMENT (OUTPATIENT)
Age: 88
End: 2023-04-10

## 2023-05-04 ENCOUNTER — NON-APPOINTMENT (OUTPATIENT)
Age: 88
End: 2023-05-04

## 2023-06-30 ENCOUNTER — RX RENEWAL (OUTPATIENT)
Age: 88
End: 2023-06-30

## 2023-07-03 ENCOUNTER — RX RENEWAL (OUTPATIENT)
Age: 88
End: 2023-07-03

## 2023-07-05 RX ORDER — LEVOTHYROXINE SODIUM 0.07 MG/1
75 TABLET ORAL
Qty: 90 | Refills: 3 | Status: ACTIVE | COMMUNITY
Start: 2022-02-23 | End: 1900-01-01

## 2023-07-10 DIAGNOSIS — D64.9 ANEMIA, UNSPECIFIED: ICD-10-CM

## 2023-07-13 LAB
ALBUMIN SERPL ELPH-MCNC: 4.2 G/DL
ALP BLD-CCNC: 128 U/L
ALT SERPL-CCNC: 12 U/L
ANION GAP SERPL CALC-SCNC: 10 MMOL/L
AST SERPL-CCNC: 15 U/L
BILIRUB SERPL-MCNC: 0.4 MG/DL
BUN SERPL-MCNC: 10 MG/DL
CALCIUM SERPL-MCNC: 10.6 MG/DL
CHLORIDE SERPL-SCNC: 99 MMOL/L
CO2 SERPL-SCNC: 27 MMOL/L
CREAT SERPL-MCNC: 0.51 MG/DL
EGFR: 90 ML/MIN/1.73M2
ESTIMATED AVERAGE GLUCOSE: 134 MG/DL
GLUCOSE SERPL-MCNC: 92 MG/DL
HBA1C MFR BLD HPLC: 6.3 %
IRON SATN MFR SERPL: 11 %
IRON SERPL-MCNC: 40 UG/DL
POTASSIUM SERPL-SCNC: 4.9 MMOL/L
PROT SERPL-MCNC: 7.4 G/DL
SODIUM SERPL-SCNC: 136 MMOL/L
TIBC SERPL-MCNC: 352 UG/DL
UIBC SERPL-MCNC: 311 UG/DL

## 2023-07-18 ENCOUNTER — EMERGENCY (EMERGENCY)
Facility: HOSPITAL | Age: 88
LOS: 1 days | Discharge: HOME | End: 2023-07-18
Attending: EMERGENCY MEDICINE
Payer: MEDICARE

## 2023-07-18 VITALS
HEART RATE: 104 BPM | RESPIRATION RATE: 18 BRPM | TEMPERATURE: 99 F | SYSTOLIC BLOOD PRESSURE: 170 MMHG | OXYGEN SATURATION: 98 % | DIASTOLIC BLOOD PRESSURE: 100 MMHG

## 2023-07-18 VITALS
DIASTOLIC BLOOD PRESSURE: 90 MMHG | TEMPERATURE: 98 F | RESPIRATION RATE: 18 BRPM | HEART RATE: 97 BPM | OXYGEN SATURATION: 97 % | SYSTOLIC BLOOD PRESSURE: 154 MMHG

## 2023-07-18 LAB
ALBUMIN SERPL ELPH-MCNC: 4.6 G/DL — SIGNIFICANT CHANGE UP (ref 3.3–5)
ALP SERPL-CCNC: 138 U/L — HIGH (ref 40–120)
ALT FLD-CCNC: 15 U/L — SIGNIFICANT CHANGE UP (ref 10–45)
ANION GAP SERPL CALC-SCNC: 12 MMOL/L — SIGNIFICANT CHANGE UP (ref 5–17)
APPEARANCE UR: ABNORMAL
APTT BLD: 38.6 SEC — HIGH (ref 27.5–35.5)
AST SERPL-CCNC: 17 U/L — SIGNIFICANT CHANGE UP (ref 10–40)
BACTERIA # UR AUTO: ABNORMAL
BASOPHILS # BLD AUTO: 0.08 K/UL — SIGNIFICANT CHANGE UP (ref 0–0.2)
BASOPHILS NFR BLD AUTO: 0.8 % — SIGNIFICANT CHANGE UP (ref 0–2)
BILIRUB SERPL-MCNC: 0.6 MG/DL — SIGNIFICANT CHANGE UP (ref 0.2–1.2)
BILIRUB UR-MCNC: NEGATIVE — SIGNIFICANT CHANGE UP
BUN SERPL-MCNC: 10 MG/DL — SIGNIFICANT CHANGE UP (ref 7–23)
CALCIUM SERPL-MCNC: 10.8 MG/DL — HIGH (ref 8.4–10.5)
CHLORIDE SERPL-SCNC: 98 MMOL/L — SIGNIFICANT CHANGE UP (ref 96–108)
CO2 SERPL-SCNC: 27 MMOL/L — SIGNIFICANT CHANGE UP (ref 22–31)
COLOR SPEC: SIGNIFICANT CHANGE UP
COMMENT - URINE: SIGNIFICANT CHANGE UP
CREAT SERPL-MCNC: 0.51 MG/DL — SIGNIFICANT CHANGE UP (ref 0.5–1.3)
DIFF PNL FLD: ABNORMAL
EGFR: 90 ML/MIN/1.73M2 — SIGNIFICANT CHANGE UP
EOSINOPHIL # BLD AUTO: 0.07 K/UL — SIGNIFICANT CHANGE UP (ref 0–0.5)
EOSINOPHIL NFR BLD AUTO: 0.7 % — SIGNIFICANT CHANGE UP (ref 0–6)
EPI CELLS # UR: 3 /HPF — SIGNIFICANT CHANGE UP
GLUCOSE SERPL-MCNC: 93 MG/DL — SIGNIFICANT CHANGE UP (ref 70–99)
GLUCOSE UR QL: NEGATIVE — SIGNIFICANT CHANGE UP
HCT VFR BLD CALC: 45.3 % — HIGH (ref 34.5–45)
HGB BLD-MCNC: 14.7 G/DL — SIGNIFICANT CHANGE UP (ref 11.5–15.5)
HYALINE CASTS # UR AUTO: 1 /LPF — SIGNIFICANT CHANGE UP (ref 0–2)
IMM GRANULOCYTES NFR BLD AUTO: 1.4 % — HIGH (ref 0–0.9)
INR BLD: 1.47 RATIO — HIGH (ref 0.88–1.16)
KETONES UR-MCNC: SIGNIFICANT CHANGE UP
LEUKOCYTE ESTERASE UR-ACNC: ABNORMAL
LYMPHOCYTES # BLD AUTO: 1.42 K/UL — SIGNIFICANT CHANGE UP (ref 1–3.3)
LYMPHOCYTES # BLD AUTO: 13.4 % — SIGNIFICANT CHANGE UP (ref 13–44)
MCHC RBC-ENTMCNC: 29 PG — SIGNIFICANT CHANGE UP (ref 27–34)
MCHC RBC-ENTMCNC: 32.5 GM/DL — SIGNIFICANT CHANGE UP (ref 32–36)
MCV RBC AUTO: 89.3 FL — SIGNIFICANT CHANGE UP (ref 80–100)
MONOCYTES # BLD AUTO: 0.4 K/UL — SIGNIFICANT CHANGE UP (ref 0–0.9)
MONOCYTES NFR BLD AUTO: 3.8 % — SIGNIFICANT CHANGE UP (ref 2–14)
NEUTROPHILS # BLD AUTO: 8.46 K/UL — HIGH (ref 1.8–7.4)
NEUTROPHILS NFR BLD AUTO: 79.9 % — HIGH (ref 43–77)
NITRITE UR-MCNC: NEGATIVE — SIGNIFICANT CHANGE UP
NRBC # BLD: 0 /100 WBCS — SIGNIFICANT CHANGE UP (ref 0–0)
OB PNL STL: POSITIVE
PH UR: 6.5 — SIGNIFICANT CHANGE UP (ref 5–8)
PLATELET # BLD AUTO: 241 K/UL — SIGNIFICANT CHANGE UP (ref 150–400)
POTASSIUM SERPL-MCNC: 4.2 MMOL/L — SIGNIFICANT CHANGE UP (ref 3.5–5.3)
POTASSIUM SERPL-SCNC: 4.2 MMOL/L — SIGNIFICANT CHANGE UP (ref 3.5–5.3)
PROT SERPL-MCNC: 8.2 G/DL — SIGNIFICANT CHANGE UP (ref 6–8.3)
PROT UR-MCNC: ABNORMAL
PROTHROM AB SERPL-ACNC: 17 SEC — HIGH (ref 10.5–13.4)
RBC # BLD: 5.07 M/UL — SIGNIFICANT CHANGE UP (ref 3.8–5.2)
RBC # FLD: 12.9 % — SIGNIFICANT CHANGE UP (ref 10.3–14.5)
RBC CASTS # UR COMP ASSIST: 1099 /HPF — HIGH (ref 0–4)
SODIUM SERPL-SCNC: 137 MMOL/L — SIGNIFICANT CHANGE UP (ref 135–145)
SP GR SPEC: 1.01 — LOW (ref 1.01–1.02)
UROBILINOGEN FLD QL: NEGATIVE — SIGNIFICANT CHANGE UP
WBC # BLD: 10.58 K/UL — HIGH (ref 3.8–10.5)
WBC # FLD AUTO: 10.58 K/UL — HIGH (ref 3.8–10.5)
WBC UR QL: 191 /HPF — HIGH (ref 0–5)

## 2023-07-18 PROCEDURE — 81001 URINALYSIS AUTO W/SCOPE: CPT

## 2023-07-18 PROCEDURE — 85610 PROTHROMBIN TIME: CPT

## 2023-07-18 PROCEDURE — 87186 SC STD MICRODIL/AGAR DIL: CPT

## 2023-07-18 PROCEDURE — 93005 ELECTROCARDIOGRAM TRACING: CPT

## 2023-07-18 PROCEDURE — 85025 COMPLETE CBC W/AUTO DIFF WBC: CPT

## 2023-07-18 PROCEDURE — 74177 CT ABD & PELVIS W/CONTRAST: CPT | Mod: MA

## 2023-07-18 PROCEDURE — 74177 CT ABD & PELVIS W/CONTRAST: CPT | Mod: 26,MA

## 2023-07-18 PROCEDURE — 80053 COMPREHEN METABOLIC PANEL: CPT

## 2023-07-18 PROCEDURE — 96374 THER/PROPH/DIAG INJ IV PUSH: CPT | Mod: XU

## 2023-07-18 PROCEDURE — 82272 OCCULT BLD FECES 1-3 TESTS: CPT

## 2023-07-18 PROCEDURE — 85730 THROMBOPLASTIN TIME PARTIAL: CPT

## 2023-07-18 PROCEDURE — 99285 EMERGENCY DEPT VISIT HI MDM: CPT

## 2023-07-18 PROCEDURE — 87086 URINE CULTURE/COLONY COUNT: CPT

## 2023-07-18 PROCEDURE — 99285 EMERGENCY DEPT VISIT HI MDM: CPT | Mod: 25

## 2023-07-18 RX ORDER — CEFTRIAXONE 500 MG/1
1000 INJECTION, POWDER, FOR SOLUTION INTRAMUSCULAR; INTRAVENOUS ONCE
Refills: 0 | Status: COMPLETED | OUTPATIENT
Start: 2023-07-18 | End: 2023-07-18

## 2023-07-18 RX ADMIN — CEFTRIAXONE 100 MILLIGRAM(S): 500 INJECTION, POWDER, FOR SOLUTION INTRAMUSCULAR; INTRAVENOUS at 21:41

## 2023-07-18 NOTE — ED ADULT NURSE NOTE - NSFALLRISKINTERV_ED_ALL_ED
Provide visual cue: yellow wristband, yellow gown, etc/Bed in lowest position, wheels locked, appropriate side rails in place/Call bell, personal items and telephone in reach/[place] to call system/Physically safe environment - no spills, clutter or unnecessary equipment/Purposeful Proactive Rounding/Room/bathroom lighting operational, light cord in reach

## 2023-07-18 NOTE — ED PROVIDER NOTE - CLINICAL SUMMARY MEDICAL DECISION MAKING FREE TEXT BOX
88-year-old female PMH A-fib on Eliquis s/p PPM, DM, HTN, HLD here with 2 days of suspected hematuria.  Also reporting some bright red blood mixed in with the stool, patient and son expressing concern for urinary tract infection.  Remarkably well-appearing on exam, with nonactionable vital signs.  Abdomen is soft, nontender.  Pelvic exam without obvious external signs of bleeding.  DDx includes but not limited to UTI, GI bleeding, underlying malignancy.  Plan basic labs, coags, UA/UC, fecal occult test, likely CT abd to further evaluate painless bleeding. 88-year-old female PMH A-fib on Eliquis s/p PPM, DM, HTN, HLD here with 2 days of suspected hematuria.  Also reporting some bright red blood mixed in with the stool, patient and son expressing concern for urinary tract infection.  Remarkably well-appearing on exam, with nonactionable vital signs.  Abdomen is soft, nontender.  Pelvic exam without obvious external signs of bleeding.  DDx includes but not limited to UTI, GI bleeding, underlying malignancy for painless hematuria.  Plan basic labs, coags, UA/UC, fecal occult test, likely CT abd to further evaluate painless bleeding.

## 2023-07-18 NOTE — ED ADULT TRIAGE NOTE - ESI TRIAGE ACUITY LEVEL, MLM
Procedure:  COLONOSCOPY    Relevant Problems   CARDIO   (+) Essential hypertension   (+) Hyperlipidemia      /RENAL   (+) ARF (acute renal failure) (HCC)      MUSCULOSKELETAL   (+) Back pain with left-sided sciatica   (+) Primary osteoarthritis of one hip, left   (+) Primary osteoarthritis of right knee      PULMONARY   (+) NICKY on CPAP        Physical Exam    Airway    Mallampati score: I  TM Distance: >3 FB  Neck ROM: full     Dental   No notable dental hx     Cardiovascular      Pulmonary      Other Findings        Anesthesia Plan  ASA Score- 2     Anesthesia Type- IV sedation with anesthesia with ASA Monitors  Additional Monitors:   Airway Plan:           Plan Factors-Exercise tolerance (METS): >4 METS  Chart reviewed  Patient summary reviewed  Patient is not a current smoker  Induction- intravenous  Postoperative Plan-     Informed Consent- Anesthetic plan and risks discussed with patient  I personally reviewed this patient with the CRNA  Discussed and agreed on the Anesthesia Plan with the CRNA  Radha Camacho 3

## 2023-07-18 NOTE — ED PROVIDER NOTE - PROGRESS NOTE DETAILS
Lottie Shaffer, Attending Physician: UA visualized by myself and appears to be the source of bleeding at this time. Delmi Bejarano MD PGY-2: CT with bladder wall thickening, consistent with UTI.  No other acute findings.  Notably guaiac positive, however hemoglobin greater than >14.0, no BMs while in ED, can f/u with PMD whom she has appt with tomorrow. Discussed results with patient and son at bedside. 1g rocephin given in ED, will send 7 day course of cefuroxime to preferred pharmacy. patient dc'd home with family in good condition. Lottie Shaffer, Attending Physician: UA visualized by myself and appears to be the source of bleeding at this time however guiac positive which is unexplained. UA suggestive of UTI but in setting of no other UTI like symptoms will obtain CT to eval for bladder CA vs. fistula.

## 2023-07-18 NOTE — ED PROVIDER NOTE - OBJECTIVE STATEMENT
88Y F PMH afib on Eliquis, 88Y F PMH afib on Eliquis, DM, HTN, HLD, hypothyroid here with hematuria. Patient reports that she began to notice blood coming from either rectum or urethra yesterday.  Unclear why bleeding is coming from however has noticed blood dripping into the toilet While urinating as well as some blood mixed into the stool during bowel movements.  Denies history of GI bleeding, primarily concerned for urinary tract infection.  Son at bedside confirms this history.  No associated fever/chills, difficulty breathing, abdominal pain, vomiting, diarrhea.

## 2023-07-18 NOTE — ED PROVIDER NOTE - ATTENDING CONTRIBUTION TO CARE
see mdm     edited by Lottie Shaffer DO - attending physician.   Please see progress notes for status/labs/consult updates and ED course after initial presentation.

## 2023-07-18 NOTE — ED PROVIDER NOTE - PATIENT PORTAL LINK FT
You can access the FollowMyHealth Patient Portal offered by [facility] by registering at the following website: [email or web address]. By joining [facility]’s FollowMyHealth portal, you will also be able to view your health information using other applications (apps) compatible with our system.

## 2023-07-18 NOTE — ED PROVIDER NOTE - PHYSICAL EXAMINATION
GENERAL: Awake, alert, NAD  HEAD: NC/AT, neck supple, moist mucous membranes  EYES: PERRL, EOM grossly intact, sclera anicteric  LUNGS: normal WOB on RA, CTAB, very faint end exp wheeze  CARDIAC: RRR, no m/r/g  ABDOMEN: Soft, non tender, non distended, no rebound, no guarding  Pelvic exam chaperoned by Dr. Shaffer - atrophic vagina, difficult visualization of urethra but no dried blood noted externally, no obvious active bleeding, from vagina, rectal exam with dark brown stool, no sammi blood noted  BACK: No midline spinal tenderness, no CVA tenderness  EXT: No edema, no calf tenderness, no deformities.  NEURO: A&Ox3. Moving all extremities. No focal deficits.   SKIN: Warm and dry. No rash.  PSYCH: Normal affect. GENERAL: Awake, alert, NAD  HEAD: NC/AT, neck supple, moist mucous membranes  EYES: PERRL, EOM grossly intact, sclera anicteric  LUNGS: normal WOB on RA, CTAB, very faint end exp wheeze  CARDIAC: RRR, no m/r/g  ABDOMEN: Soft, non tender, non distended, no rebound, no guarding  Pelvic exam chaperoned by Dr. Shaffer - atrophic vagina, difficult visualization of urethra but no dried blood noted externally, no obvious active bleeding, from vagina, rectal exam with light brown stool without melena or hematochezia, no sammi blood noted  BACK: No midline spinal tenderness, no CVA tenderness  EXT: No edema, no calf tenderness, no deformities.  NEURO: A&Ox3. Moving all extremities. No focal deficits.   SKIN: Warm and dry. No rash.  PSYCH: Normal affect.

## 2023-07-18 NOTE — ED ADULT NURSE NOTE - OBJECTIVE STATEMENT
87 y/o F A&Ox2 oriented to self and place with PMH of Afib on Xarelto and Pacemaker. Pt presents to the ED via EMS c/o vaginal bleeding. Pt's family at bedside reports patient's caretaker noticed "some redness" in her urine yesterday. Pt's family reports they noticed consistent vaginal bleeding today. Pt endorses some burning upon urination and weakness. Denies n/v/d, lightheadedness, dizziness, fever or chills. IV access established. Patient safety and comfort measures implemented.

## 2023-07-18 NOTE — ED PROVIDER NOTE - NSFOLLOWUPINSTRUCTIONS_ED_ALL_ED_FT
You were seen in the emergency department today for blood in your urine.    Your urine test showed evidence of a UTI.  You were treated with a dose of antibiotics through the IV.  I sent another antibiotic to your pharmacy called cefuroxime.  Please take this medication 2 times per day for the next 7 days.    We sent a sample of your stool to the lab to check for blood.  This test was positive today, however you did not have any episodes of bloody stools while you were in the ER and your hemoglobin level was stable. You should follow-up with your primary doctor in the next week to discuss whether you need to have additional testing performed.    If you develop new or worsening symptoms including chest pain, difficulty breathing, loss of consciousness, fevers, vomiting, increasing blood in your stool or urine, or you are otherwise concerned, please come back to the ER to be reevaluated.

## 2023-07-18 NOTE — ED PROVIDER NOTE - NSICDXPASTMEDICALHX_GEN_ALL_CORE_FT
PAST MEDICAL HISTORY:  Afib     DM (diabetes mellitus)     HLD (hyperlipidemia)     HTN (hypertension)     Hypothyroid

## 2023-07-19 ENCOUNTER — APPOINTMENT (OUTPATIENT)
Dept: GERIATRICS | Facility: CLINIC | Age: 88
End: 2023-07-19

## 2023-07-19 RX ORDER — CEFUROXIME AXETIL 250 MG
1 TABLET ORAL
Qty: 14 | Refills: 0
Start: 2023-07-19 | End: 2023-07-25

## 2023-07-20 RX ORDER — CEFUROXIME AXETIL 250 MG
1 TABLET ORAL
Qty: 14 | Refills: 0
Start: 2023-07-20 | End: 2023-07-26

## 2023-08-07 ENCOUNTER — LABORATORY RESULT (OUTPATIENT)
Age: 88
End: 2023-08-07

## 2023-08-08 ENCOUNTER — LABORATORY RESULT (OUTPATIENT)
Age: 88
End: 2023-08-08

## 2023-08-09 ENCOUNTER — APPOINTMENT (OUTPATIENT)
Dept: GERIATRICS | Facility: CLINIC | Age: 88
End: 2023-08-09

## 2023-08-29 ENCOUNTER — LABORATORY RESULT (OUTPATIENT)
Age: 88
End: 2023-08-29

## 2023-08-30 LAB
APPEARANCE: ABNORMAL
BILIRUBIN URINE: NEGATIVE
BLOOD URINE: ABNORMAL
COLOR: YELLOW
GLUCOSE QUALITATIVE U: NEGATIVE MG/DL
KETONES URINE: NEGATIVE MG/DL
LEUKOCYTE ESTERASE URINE: ABNORMAL
NITRITE URINE: POSITIVE
PH URINE: 6.5
PROTEIN URINE: 30 MG/DL
SPECIFIC GRAVITY URINE: 1.01
UROBILINOGEN URINE: 0.2 MG/DL

## 2023-08-30 RX ORDER — AMOXICILLIN AND CLAVULANATE POTASSIUM 500; 125 MG/1; MG/1
500-125 TABLET, FILM COATED ORAL
Qty: 10 | Refills: 0 | Status: DISCONTINUED | COMMUNITY
Start: 2023-08-11 | End: 2023-08-30

## 2023-08-30 RX ORDER — SULFAMETHOXAZOLE AND TRIMETHOPRIM 800; 160 MG/1; MG/1
800-160 TABLET ORAL TWICE DAILY
Qty: 6 | Refills: 0 | Status: ACTIVE | COMMUNITY
Start: 2023-08-30 | End: 1900-01-01

## 2023-10-25 ENCOUNTER — LABORATORY RESULT (OUTPATIENT)
Age: 88
End: 2023-10-25

## 2023-10-26 ENCOUNTER — APPOINTMENT (OUTPATIENT)
Dept: GERIATRICS | Facility: CLINIC | Age: 88
End: 2023-10-26
Payer: MEDICARE

## 2023-10-26 DIAGNOSIS — N39.0 URINARY TRACT INFECTION, SITE NOT SPECIFIED: ICD-10-CM

## 2023-10-26 DIAGNOSIS — I48.91 UNSPECIFIED ATRIAL FIBRILLATION: ICD-10-CM

## 2023-10-26 PROCEDURE — 99214 OFFICE O/P EST MOD 30 MIN: CPT | Mod: 95

## 2023-10-26 RX ORDER — FUROSEMIDE 40 MG/1
40 TABLET ORAL
Qty: 30 | Refills: 5 | Status: DISCONTINUED | COMMUNITY
Start: 2022-02-18 | End: 2023-10-26

## 2023-10-27 LAB
APPEARANCE: CLEAR
BILIRUBIN URINE: NEGATIVE
BLOOD URINE: NEGATIVE
COLOR: YELLOW
GLUCOSE QUALITATIVE U: NEGATIVE MG/DL
KETONES URINE: NEGATIVE MG/DL
LEUKOCYTE ESTERASE URINE: ABNORMAL
NITRITE URINE: NEGATIVE
PH URINE: 6
PROTEIN URINE: NEGATIVE MG/DL
SPECIFIC GRAVITY URINE: 1.02
UROBILINOGEN URINE: 0.2 MG/DL

## 2023-10-30 RX ORDER — NITROFURANTOIN (MONOHYDRATE/MACROCRYSTALS) 25; 75 MG/1; MG/1
100 CAPSULE ORAL
Qty: 10 | Refills: 0 | Status: ACTIVE | COMMUNITY
Start: 2023-10-30 | End: 1900-01-01

## 2023-11-08 ENCOUNTER — APPOINTMENT (OUTPATIENT)
Dept: GERIATRICS | Facility: CLINIC | Age: 88
End: 2023-11-08
Payer: MEDICARE

## 2023-11-08 DIAGNOSIS — R30.0 DYSURIA: ICD-10-CM

## 2023-11-08 DIAGNOSIS — I10 ESSENTIAL (PRIMARY) HYPERTENSION: ICD-10-CM

## 2023-11-08 DIAGNOSIS — L71.9 ROSACEA, UNSPECIFIED: ICD-10-CM

## 2023-11-08 DIAGNOSIS — E03.9 HYPOTHYROIDISM, UNSPECIFIED: ICD-10-CM

## 2023-11-08 DIAGNOSIS — E78.5 HYPERLIPIDEMIA, UNSPECIFIED: ICD-10-CM

## 2023-11-08 PROCEDURE — 99214 OFFICE O/P EST MOD 30 MIN: CPT | Mod: 95

## 2023-11-08 RX ORDER — METRONIDAZOLE 7.5 MG/G
0.75 LOTION TOPICAL TWICE DAILY
Qty: 1 | Refills: 0 | Status: ACTIVE | COMMUNITY
Start: 2023-11-08 | End: 1900-01-01

## 2023-11-09 ENCOUNTER — LABORATORY RESULT (OUTPATIENT)
Age: 88
End: 2023-11-09

## 2023-11-10 LAB
25(OH)D3 SERPL-MCNC: 67 NG/ML
ALBUMIN SERPL ELPH-MCNC: 4.1 G/DL
ALP BLD-CCNC: 118 U/L
ALT SERPL-CCNC: 11 U/L
ANION GAP SERPL CALC-SCNC: 11 MMOL/L
APPEARANCE: CLEAR
AST SERPL-CCNC: 12 U/L
BILIRUB SERPL-MCNC: 0.4 MG/DL
BILIRUBIN URINE: NEGATIVE
BLOOD URINE: NEGATIVE
BUN SERPL-MCNC: 12 MG/DL
CALCIUM SERPL-MCNC: 10.6 MG/DL
CHLORIDE SERPL-SCNC: 102 MMOL/L
CHOLEST SERPL-MCNC: 177 MG/DL
CO2 SERPL-SCNC: 28 MMOL/L
COLOR: YELLOW
CREAT SERPL-MCNC: 0.52 MG/DL
EGFR: 90 ML/MIN/1.73M2
ESTIMATED AVERAGE GLUCOSE: 134 MG/DL
GLUCOSE QUALITATIVE U: NEGATIVE MG/DL
GLUCOSE SERPL-MCNC: 90 MG/DL
HBA1C MFR BLD HPLC: 6.3 %
HCT VFR BLD CALC: 43.8 %
HDLC SERPL-MCNC: 47 MG/DL
HGB BLD-MCNC: 13.7 G/DL
KETONES URINE: NEGATIVE MG/DL
LDLC SERPL CALC-MCNC: 115 MG/DL
LEUKOCYTE ESTERASE URINE: ABNORMAL
MCHC RBC-ENTMCNC: 28.8 PG
MCHC RBC-ENTMCNC: 31.3 GM/DL
MCV RBC AUTO: 92.2 FL
NITRITE URINE: NEGATIVE
NONHDLC SERPL-MCNC: 130 MG/DL
PH URINE: 6
PLATELET # BLD AUTO: 230 K/UL
POTASSIUM SERPL-SCNC: 4.6 MMOL/L
PROT SERPL-MCNC: 7.2 G/DL
PROTEIN URINE: NORMAL MG/DL
RBC # BLD: 4.75 M/UL
RBC # FLD: 13.8 %
SODIUM SERPL-SCNC: 140 MMOL/L
SPECIFIC GRAVITY URINE: 1.02
TRIGL SERPL-MCNC: 78 MG/DL
TSH SERPL-ACNC: 0.8 UIU/ML
UROBILINOGEN URINE: 0.2 MG/DL
WBC # FLD AUTO: 6.37 K/UL

## 2023-11-15 RX ORDER — AMOXICILLIN AND CLAVULANATE POTASSIUM 500; 125 MG/1; MG/1
500-125 TABLET, FILM COATED ORAL
Qty: 10 | Refills: 0 | Status: ACTIVE | COMMUNITY
Start: 2023-11-15 | End: 1900-01-01

## 2023-11-21 ENCOUNTER — APPOINTMENT (OUTPATIENT)
Dept: GERIATRICS | Facility: CLINIC | Age: 88
End: 2023-11-21
Payer: MEDICARE

## 2023-11-21 DIAGNOSIS — N39.0 URINARY TRACT INFECTION, SITE NOT SPECIFIED: ICD-10-CM

## 2023-11-21 DIAGNOSIS — F03.90 UNSPECIFIED DEMENTIA W/OUT BEHAVIORAL DISTURBANCE: ICD-10-CM

## 2023-11-21 PROCEDURE — 99213 OFFICE O/P EST LOW 20 MIN: CPT | Mod: 95

## 2024-01-02 ENCOUNTER — APPOINTMENT (OUTPATIENT)
Dept: GERIATRICS | Facility: CLINIC | Age: 89
End: 2024-01-02
Payer: COMMERCIAL

## 2024-01-02 DIAGNOSIS — J06.9 ACUTE UPPER RESPIRATORY INFECTION, UNSPECIFIED: ICD-10-CM

## 2024-01-02 PROCEDURE — 99442: CPT

## 2024-01-02 PROCEDURE — ZZZZZ: CPT

## 2024-01-03 ENCOUNTER — LABORATORY RESULT (OUTPATIENT)
Age: 89
End: 2024-01-03

## 2024-01-04 ENCOUNTER — LABORATORY RESULT (OUTPATIENT)
Age: 89
End: 2024-01-04

## 2024-01-04 ENCOUNTER — NON-APPOINTMENT (OUTPATIENT)
Age: 89
End: 2024-01-04

## 2024-01-04 ENCOUNTER — INPATIENT (INPATIENT)
Facility: HOSPITAL | Age: 89
LOS: 4 days | Discharge: HOME | DRG: 152 | End: 2024-01-09
Attending: STUDENT IN AN ORGANIZED HEALTH CARE EDUCATION/TRAINING PROGRAM | Admitting: STUDENT IN AN ORGANIZED HEALTH CARE EDUCATION/TRAINING PROGRAM
Payer: MEDICARE

## 2024-01-04 VITALS
DIASTOLIC BLOOD PRESSURE: 92 MMHG | HEART RATE: 84 BPM | WEIGHT: 119.93 LBS | TEMPERATURE: 98 F | RESPIRATION RATE: 20 BRPM | OXYGEN SATURATION: 97 % | SYSTOLIC BLOOD PRESSURE: 153 MMHG | HEIGHT: 62 IN

## 2024-01-04 DIAGNOSIS — I10 ESSENTIAL (PRIMARY) HYPERTENSION: ICD-10-CM

## 2024-01-04 DIAGNOSIS — E03.9 HYPOTHYROIDISM, UNSPECIFIED: ICD-10-CM

## 2024-01-04 DIAGNOSIS — F03.90 UNSPECIFIED DEMENTIA WITHOUT BEHAVIORAL DISTURBANCE: ICD-10-CM

## 2024-01-04 DIAGNOSIS — I48.20 CHRONIC ATRIAL FIBRILLATION, UNSPECIFIED: ICD-10-CM

## 2024-01-04 DIAGNOSIS — B33.8 OTHER SPECIFIED VIRAL DISEASES: ICD-10-CM

## 2024-01-04 DIAGNOSIS — R09.02 HYPOXEMIA: ICD-10-CM

## 2024-01-04 LAB
ALBUMIN SERPL ELPH-MCNC: 3.9 G/DL — SIGNIFICANT CHANGE UP (ref 3.3–5)
ALBUMIN SERPL ELPH-MCNC: 3.9 G/DL — SIGNIFICANT CHANGE UP (ref 3.3–5)
ALP SERPL-CCNC: 112 U/L — SIGNIFICANT CHANGE UP (ref 40–120)
ALP SERPL-CCNC: 112 U/L — SIGNIFICANT CHANGE UP (ref 40–120)
ALT FLD-CCNC: 14 U/L — SIGNIFICANT CHANGE UP (ref 10–45)
ALT FLD-CCNC: 14 U/L — SIGNIFICANT CHANGE UP (ref 10–45)
ANION GAP SERPL CALC-SCNC: 9 MMOL/L — SIGNIFICANT CHANGE UP (ref 5–17)
ANION GAP SERPL CALC-SCNC: 9 MMOL/L — SIGNIFICANT CHANGE UP (ref 5–17)
APPEARANCE UR: CLEAR — SIGNIFICANT CHANGE UP
APPEARANCE UR: CLEAR — SIGNIFICANT CHANGE UP
APTT BLD: 33.9 SEC — SIGNIFICANT CHANGE UP (ref 24.5–35.6)
APTT BLD: 33.9 SEC — SIGNIFICANT CHANGE UP (ref 24.5–35.6)
AST SERPL-CCNC: 19 U/L — SIGNIFICANT CHANGE UP (ref 10–40)
AST SERPL-CCNC: 19 U/L — SIGNIFICANT CHANGE UP (ref 10–40)
BACTERIA # UR AUTO: NEGATIVE /HPF — SIGNIFICANT CHANGE UP
BACTERIA # UR AUTO: NEGATIVE /HPF — SIGNIFICANT CHANGE UP
BASE EXCESS BLDV CALC-SCNC: 2.7 MMOL/L — SIGNIFICANT CHANGE UP (ref -2–3)
BASE EXCESS BLDV CALC-SCNC: 2.7 MMOL/L — SIGNIFICANT CHANGE UP (ref -2–3)
BASOPHILS # BLD AUTO: 0.02 K/UL — SIGNIFICANT CHANGE UP (ref 0–0.2)
BASOPHILS # BLD AUTO: 0.02 K/UL — SIGNIFICANT CHANGE UP (ref 0–0.2)
BASOPHILS NFR BLD AUTO: 0.4 % — SIGNIFICANT CHANGE UP (ref 0–2)
BASOPHILS NFR BLD AUTO: 0.4 % — SIGNIFICANT CHANGE UP (ref 0–2)
BILIRUB SERPL-MCNC: 0.4 MG/DL — SIGNIFICANT CHANGE UP (ref 0.2–1.2)
BILIRUB SERPL-MCNC: 0.4 MG/DL — SIGNIFICANT CHANGE UP (ref 0.2–1.2)
BILIRUB UR-MCNC: NEGATIVE — SIGNIFICANT CHANGE UP
BILIRUB UR-MCNC: NEGATIVE — SIGNIFICANT CHANGE UP
BUN SERPL-MCNC: 13 MG/DL — SIGNIFICANT CHANGE UP (ref 7–23)
BUN SERPL-MCNC: 13 MG/DL — SIGNIFICANT CHANGE UP (ref 7–23)
CA-I SERPL-SCNC: 1.33 MMOL/L — SIGNIFICANT CHANGE UP (ref 1.15–1.33)
CA-I SERPL-SCNC: 1.33 MMOL/L — SIGNIFICANT CHANGE UP (ref 1.15–1.33)
CALCIUM SERPL-MCNC: 10.1 MG/DL — SIGNIFICANT CHANGE UP (ref 8.4–10.5)
CALCIUM SERPL-MCNC: 10.1 MG/DL — SIGNIFICANT CHANGE UP (ref 8.4–10.5)
CAST: 0 /LPF — SIGNIFICANT CHANGE UP (ref 0–4)
CAST: 0 /LPF — SIGNIFICANT CHANGE UP (ref 0–4)
CHLORIDE BLDV-SCNC: 97 MMOL/L — SIGNIFICANT CHANGE UP (ref 96–108)
CHLORIDE BLDV-SCNC: 97 MMOL/L — SIGNIFICANT CHANGE UP (ref 96–108)
CHLORIDE SERPL-SCNC: 97 MMOL/L — SIGNIFICANT CHANGE UP (ref 96–108)
CHLORIDE SERPL-SCNC: 97 MMOL/L — SIGNIFICANT CHANGE UP (ref 96–108)
CO2 BLDV-SCNC: 30 MMOL/L — HIGH (ref 22–26)
CO2 BLDV-SCNC: 30 MMOL/L — HIGH (ref 22–26)
CO2 SERPL-SCNC: 25 MMOL/L — SIGNIFICANT CHANGE UP (ref 22–31)
CO2 SERPL-SCNC: 25 MMOL/L — SIGNIFICANT CHANGE UP (ref 22–31)
COLOR SPEC: YELLOW — SIGNIFICANT CHANGE UP
COLOR SPEC: YELLOW — SIGNIFICANT CHANGE UP
CREAT SERPL-MCNC: 0.45 MG/DL — LOW (ref 0.5–1.3)
CREAT SERPL-MCNC: 0.45 MG/DL — LOW (ref 0.5–1.3)
DIFF PNL FLD: NEGATIVE — SIGNIFICANT CHANGE UP
DIFF PNL FLD: NEGATIVE — SIGNIFICANT CHANGE UP
EGFR: 93 ML/MIN/1.73M2 — SIGNIFICANT CHANGE UP
EGFR: 93 ML/MIN/1.73M2 — SIGNIFICANT CHANGE UP
EOSINOPHIL # BLD AUTO: 0 K/UL — SIGNIFICANT CHANGE UP (ref 0–0.5)
EOSINOPHIL # BLD AUTO: 0 K/UL — SIGNIFICANT CHANGE UP (ref 0–0.5)
EOSINOPHIL NFR BLD AUTO: 0 % — SIGNIFICANT CHANGE UP (ref 0–6)
EOSINOPHIL NFR BLD AUTO: 0 % — SIGNIFICANT CHANGE UP (ref 0–6)
FLUAV AG NPH QL: SIGNIFICANT CHANGE UP
FLUAV AG NPH QL: SIGNIFICANT CHANGE UP
FLUBV AG NPH QL: SIGNIFICANT CHANGE UP
FLUBV AG NPH QL: SIGNIFICANT CHANGE UP
GAS PNL BLDV: 130 MMOL/L — LOW (ref 136–145)
GAS PNL BLDV: 130 MMOL/L — LOW (ref 136–145)
GAS PNL BLDV: SIGNIFICANT CHANGE UP
GLUCOSE BLDV-MCNC: 131 MG/DL — HIGH (ref 70–99)
GLUCOSE BLDV-MCNC: 131 MG/DL — HIGH (ref 70–99)
GLUCOSE SERPL-MCNC: 127 MG/DL — HIGH (ref 70–99)
GLUCOSE SERPL-MCNC: 127 MG/DL — HIGH (ref 70–99)
GLUCOSE UR QL: NEGATIVE MG/DL — SIGNIFICANT CHANGE UP
GLUCOSE UR QL: NEGATIVE MG/DL — SIGNIFICANT CHANGE UP
HCO3 BLDV-SCNC: 28 MMOL/L — SIGNIFICANT CHANGE UP (ref 22–29)
HCO3 BLDV-SCNC: 28 MMOL/L — SIGNIFICANT CHANGE UP (ref 22–29)
HCT VFR BLD CALC: 41.5 % — SIGNIFICANT CHANGE UP (ref 34.5–45)
HCT VFR BLD CALC: 41.5 % — SIGNIFICANT CHANGE UP (ref 34.5–45)
HCT VFR BLDA CALC: 43 % — SIGNIFICANT CHANGE UP (ref 34.5–46.5)
HCT VFR BLDA CALC: 43 % — SIGNIFICANT CHANGE UP (ref 34.5–46.5)
HGB BLD CALC-MCNC: 14.3 G/DL — SIGNIFICANT CHANGE UP (ref 11.7–16.1)
HGB BLD CALC-MCNC: 14.3 G/DL — SIGNIFICANT CHANGE UP (ref 11.7–16.1)
HGB BLD-MCNC: 13.9 G/DL — SIGNIFICANT CHANGE UP (ref 11.5–15.5)
HGB BLD-MCNC: 13.9 G/DL — SIGNIFICANT CHANGE UP (ref 11.5–15.5)
IMM GRANULOCYTES NFR BLD AUTO: 1.1 % — HIGH (ref 0–0.9)
IMM GRANULOCYTES NFR BLD AUTO: 1.1 % — HIGH (ref 0–0.9)
INR BLD: 1.5 RATIO — HIGH (ref 0.85–1.18)
INR BLD: 1.5 RATIO — HIGH (ref 0.85–1.18)
KETONES UR-MCNC: NEGATIVE MG/DL — SIGNIFICANT CHANGE UP
KETONES UR-MCNC: NEGATIVE MG/DL — SIGNIFICANT CHANGE UP
LACTATE BLDV-MCNC: 1.5 MMOL/L — SIGNIFICANT CHANGE UP (ref 0.5–2)
LACTATE BLDV-MCNC: 1.5 MMOL/L — SIGNIFICANT CHANGE UP (ref 0.5–2)
LEUKOCYTE ESTERASE UR-ACNC: NEGATIVE — SIGNIFICANT CHANGE UP
LEUKOCYTE ESTERASE UR-ACNC: NEGATIVE — SIGNIFICANT CHANGE UP
LIDOCAIN IGE QN: 26 U/L — SIGNIFICANT CHANGE UP (ref 7–60)
LIDOCAIN IGE QN: 26 U/L — SIGNIFICANT CHANGE UP (ref 7–60)
LYMPHOCYTES # BLD AUTO: 0.96 K/UL — LOW (ref 1–3.3)
LYMPHOCYTES # BLD AUTO: 0.96 K/UL — LOW (ref 1–3.3)
LYMPHOCYTES # BLD AUTO: 17.4 % — SIGNIFICANT CHANGE UP (ref 13–44)
LYMPHOCYTES # BLD AUTO: 17.4 % — SIGNIFICANT CHANGE UP (ref 13–44)
MAGNESIUM SERPL-MCNC: 1.8 MG/DL — SIGNIFICANT CHANGE UP (ref 1.6–2.6)
MAGNESIUM SERPL-MCNC: 1.8 MG/DL — SIGNIFICANT CHANGE UP (ref 1.6–2.6)
MCHC RBC-ENTMCNC: 29.7 PG — SIGNIFICANT CHANGE UP (ref 27–34)
MCHC RBC-ENTMCNC: 29.7 PG — SIGNIFICANT CHANGE UP (ref 27–34)
MCHC RBC-ENTMCNC: 33.5 GM/DL — SIGNIFICANT CHANGE UP (ref 32–36)
MCHC RBC-ENTMCNC: 33.5 GM/DL — SIGNIFICANT CHANGE UP (ref 32–36)
MCV RBC AUTO: 88.7 FL — SIGNIFICANT CHANGE UP (ref 80–100)
MCV RBC AUTO: 88.7 FL — SIGNIFICANT CHANGE UP (ref 80–100)
MONOCYTES # BLD AUTO: 0.44 K/UL — SIGNIFICANT CHANGE UP (ref 0–0.9)
MONOCYTES # BLD AUTO: 0.44 K/UL — SIGNIFICANT CHANGE UP (ref 0–0.9)
MONOCYTES NFR BLD AUTO: 8 % — SIGNIFICANT CHANGE UP (ref 2–14)
MONOCYTES NFR BLD AUTO: 8 % — SIGNIFICANT CHANGE UP (ref 2–14)
NEUTROPHILS # BLD AUTO: 4.03 K/UL — SIGNIFICANT CHANGE UP (ref 1.8–7.4)
NEUTROPHILS # BLD AUTO: 4.03 K/UL — SIGNIFICANT CHANGE UP (ref 1.8–7.4)
NEUTROPHILS NFR BLD AUTO: 73.1 % — SIGNIFICANT CHANGE UP (ref 43–77)
NEUTROPHILS NFR BLD AUTO: 73.1 % — SIGNIFICANT CHANGE UP (ref 43–77)
NITRITE UR-MCNC: NEGATIVE — SIGNIFICANT CHANGE UP
NITRITE UR-MCNC: NEGATIVE — SIGNIFICANT CHANGE UP
NRBC # BLD: 0 /100 WBCS — SIGNIFICANT CHANGE UP (ref 0–0)
NRBC # BLD: 0 /100 WBCS — SIGNIFICANT CHANGE UP (ref 0–0)
NT-PROBNP SERPL-SCNC: 1679 PG/ML — HIGH (ref 0–300)
NT-PROBNP SERPL-SCNC: 1679 PG/ML — HIGH (ref 0–300)
PCO2 BLDV: 47 MMHG — HIGH (ref 39–42)
PCO2 BLDV: 47 MMHG — HIGH (ref 39–42)
PH BLDV: 7.39 — SIGNIFICANT CHANGE UP (ref 7.32–7.43)
PH BLDV: 7.39 — SIGNIFICANT CHANGE UP (ref 7.32–7.43)
PH UR: 5.5 — SIGNIFICANT CHANGE UP (ref 5–8)
PH UR: 5.5 — SIGNIFICANT CHANGE UP (ref 5–8)
PLATELET # BLD AUTO: 186 K/UL — SIGNIFICANT CHANGE UP (ref 150–400)
PLATELET # BLD AUTO: 186 K/UL — SIGNIFICANT CHANGE UP (ref 150–400)
PO2 BLDV: 57 MMHG — HIGH (ref 25–45)
PO2 BLDV: 57 MMHG — HIGH (ref 25–45)
POTASSIUM BLDV-SCNC: 4.3 MMOL/L — SIGNIFICANT CHANGE UP (ref 3.5–5.1)
POTASSIUM BLDV-SCNC: 4.3 MMOL/L — SIGNIFICANT CHANGE UP (ref 3.5–5.1)
POTASSIUM SERPL-MCNC: 4.1 MMOL/L — SIGNIFICANT CHANGE UP (ref 3.5–5.3)
POTASSIUM SERPL-MCNC: 4.1 MMOL/L — SIGNIFICANT CHANGE UP (ref 3.5–5.3)
POTASSIUM SERPL-SCNC: 4.1 MMOL/L — SIGNIFICANT CHANGE UP (ref 3.5–5.3)
POTASSIUM SERPL-SCNC: 4.1 MMOL/L — SIGNIFICANT CHANGE UP (ref 3.5–5.3)
PROT SERPL-MCNC: 7.4 G/DL — SIGNIFICANT CHANGE UP (ref 6–8.3)
PROT SERPL-MCNC: 7.4 G/DL — SIGNIFICANT CHANGE UP (ref 6–8.3)
PROT UR-MCNC: 100 MG/DL
PROT UR-MCNC: 100 MG/DL
PROTHROM AB SERPL-ACNC: 15.6 SEC — HIGH (ref 9.5–13)
PROTHROM AB SERPL-ACNC: 15.6 SEC — HIGH (ref 9.5–13)
RBC # BLD: 4.68 M/UL — SIGNIFICANT CHANGE UP (ref 3.8–5.2)
RBC # BLD: 4.68 M/UL — SIGNIFICANT CHANGE UP (ref 3.8–5.2)
RBC # FLD: 13.1 % — SIGNIFICANT CHANGE UP (ref 10.3–14.5)
RBC # FLD: 13.1 % — SIGNIFICANT CHANGE UP (ref 10.3–14.5)
RBC CASTS # UR COMP ASSIST: 1 /HPF — SIGNIFICANT CHANGE UP (ref 0–4)
RBC CASTS # UR COMP ASSIST: 1 /HPF — SIGNIFICANT CHANGE UP (ref 0–4)
RSV RNA NPH QL NAA+NON-PROBE: DETECTED
RSV RNA NPH QL NAA+NON-PROBE: DETECTED
SAO2 % BLDV: 88.1 % — HIGH (ref 67–88)
SAO2 % BLDV: 88.1 % — HIGH (ref 67–88)
SARS-COV-2 RNA SPEC QL NAA+PROBE: SIGNIFICANT CHANGE UP
SARS-COV-2 RNA SPEC QL NAA+PROBE: SIGNIFICANT CHANGE UP
SODIUM SERPL-SCNC: 131 MMOL/L — LOW (ref 135–145)
SODIUM SERPL-SCNC: 131 MMOL/L — LOW (ref 135–145)
SP GR SPEC: 1.03 — SIGNIFICANT CHANGE UP (ref 1–1.03)
SP GR SPEC: 1.03 — SIGNIFICANT CHANGE UP (ref 1–1.03)
SQUAMOUS # UR AUTO: 0 /HPF — SIGNIFICANT CHANGE UP (ref 0–5)
SQUAMOUS # UR AUTO: 0 /HPF — SIGNIFICANT CHANGE UP (ref 0–5)
TROPONIN T, HIGH SENSITIVITY RESULT: 16 NG/L — SIGNIFICANT CHANGE UP (ref 0–51)
TROPONIN T, HIGH SENSITIVITY RESULT: 16 NG/L — SIGNIFICANT CHANGE UP (ref 0–51)
TROPONIN T, HIGH SENSITIVITY RESULT: 20 NG/L — SIGNIFICANT CHANGE UP (ref 0–51)
TROPONIN T, HIGH SENSITIVITY RESULT: 20 NG/L — SIGNIFICANT CHANGE UP (ref 0–51)
UROBILINOGEN FLD QL: 0.2 MG/DL — SIGNIFICANT CHANGE UP (ref 0.2–1)
UROBILINOGEN FLD QL: 0.2 MG/DL — SIGNIFICANT CHANGE UP (ref 0.2–1)
WBC # BLD: 5.51 K/UL — SIGNIFICANT CHANGE UP (ref 3.8–10.5)
WBC # BLD: 5.51 K/UL — SIGNIFICANT CHANGE UP (ref 3.8–10.5)
WBC # FLD AUTO: 5.51 K/UL — SIGNIFICANT CHANGE UP (ref 3.8–10.5)
WBC # FLD AUTO: 5.51 K/UL — SIGNIFICANT CHANGE UP (ref 3.8–10.5)
WBC UR QL: 1 /HPF — SIGNIFICANT CHANGE UP (ref 0–5)
WBC UR QL: 1 /HPF — SIGNIFICANT CHANGE UP (ref 0–5)

## 2024-01-04 PROCEDURE — 71045 X-RAY EXAM CHEST 1 VIEW: CPT | Mod: 26

## 2024-01-04 PROCEDURE — 99285 EMERGENCY DEPT VISIT HI MDM: CPT | Mod: GC

## 2024-01-04 PROCEDURE — 71045 X-RAY EXAM CHEST 1 VIEW: CPT | Mod: 26,77

## 2024-01-04 PROCEDURE — 99222 1ST HOSP IP/OBS MODERATE 55: CPT

## 2024-01-04 RX ORDER — LEVOTHYROXINE SODIUM 125 MCG
75 TABLET ORAL DAILY
Refills: 0 | Status: DISCONTINUED | OUTPATIENT
Start: 2024-01-04 | End: 2024-01-09

## 2024-01-04 RX ORDER — CHOLECALCIFEROL (VITAMIN D3) 125 MCG
1 CAPSULE ORAL
Refills: 0 | DISCHARGE

## 2024-01-04 RX ORDER — IPRATROPIUM/ALBUTEROL SULFATE 18-103MCG
3 AEROSOL WITH ADAPTER (GRAM) INHALATION EVERY 6 HOURS
Refills: 0 | Status: DISCONTINUED | OUTPATIENT
Start: 2024-01-04 | End: 2024-01-09

## 2024-01-04 RX ORDER — RIVAROXABAN 15 MG-20MG
20 KIT ORAL
Refills: 0 | Status: DISCONTINUED | OUTPATIENT
Start: 2024-01-04 | End: 2024-01-09

## 2024-01-04 RX ORDER — SODIUM CHLORIDE 9 MG/ML
500 INJECTION INTRAMUSCULAR; INTRAVENOUS; SUBCUTANEOUS ONCE
Refills: 0 | Status: COMPLETED | OUTPATIENT
Start: 2024-01-04 | End: 2024-01-04

## 2024-01-04 RX ORDER — IPRATROPIUM/ALBUTEROL SULFATE 18-103MCG
3 AEROSOL WITH ADAPTER (GRAM) INHALATION ONCE
Refills: 0 | Status: COMPLETED | OUTPATIENT
Start: 2024-01-04 | End: 2024-01-04

## 2024-01-04 RX ORDER — ACETAMINOPHEN 500 MG
650 TABLET ORAL EVERY 6 HOURS
Refills: 0 | Status: DISCONTINUED | OUTPATIENT
Start: 2024-01-04 | End: 2024-01-09

## 2024-01-04 RX ORDER — LEVOTHYROXINE SODIUM 125 MCG
1 TABLET ORAL
Refills: 0 | DISCHARGE

## 2024-01-04 RX ORDER — CARVEDILOL PHOSPHATE 80 MG/1
3.12 CAPSULE, EXTENDED RELEASE ORAL EVERY 12 HOURS
Refills: 0 | Status: DISCONTINUED | OUTPATIENT
Start: 2024-01-04 | End: 2024-01-09

## 2024-01-04 RX ORDER — ACETAMINOPHEN 500 MG
1000 TABLET ORAL ONCE
Refills: 0 | Status: COMPLETED | OUTPATIENT
Start: 2024-01-04 | End: 2024-01-04

## 2024-01-04 RX ORDER — ASCORBIC ACID 60 MG
2 TABLET,CHEWABLE ORAL
Refills: 0 | DISCHARGE

## 2024-01-04 RX ORDER — FOLIC ACID 0.8 MG
1 TABLET ORAL
Refills: 0 | DISCHARGE

## 2024-01-04 RX ADMIN — RIVAROXABAN 20 MILLIGRAM(S): KIT at 18:03

## 2024-01-04 RX ADMIN — CARVEDILOL PHOSPHATE 3.12 MILLIGRAM(S): 80 CAPSULE, EXTENDED RELEASE ORAL at 18:03

## 2024-01-04 RX ADMIN — SODIUM CHLORIDE 500 MILLILITER(S): 9 INJECTION INTRAMUSCULAR; INTRAVENOUS; SUBCUTANEOUS at 12:31

## 2024-01-04 RX ADMIN — Medication 3 MILLILITER(S): at 11:44

## 2024-01-04 RX ADMIN — Medication 3 MILLILITER(S): at 18:03

## 2024-01-04 RX ADMIN — Medication 400 MILLIGRAM(S): at 12:23

## 2024-01-04 RX ADMIN — SODIUM CHLORIDE 500 MILLILITER(S): 9 INJECTION INTRAMUSCULAR; INTRAVENOUS; SUBCUTANEOUS at 11:44

## 2024-01-04 RX ADMIN — Medication 600 MILLIGRAM(S): at 18:03

## 2024-01-04 NOTE — H&P ADULT - ASSESSMENT
88F with PMHx of HTN, chronic atrial fibrillation (with PPM), hypothyroidism, and dementia (dependent on others for ADLs) presenting for one week history of cough and weakness in setting of RSV infection.

## 2024-01-04 NOTE — PATIENT PROFILE ADULT - FALL HARM RISK - HARM RISK INTERVENTIONS
Assistance OOB with selected safe patient handling equipment/Communicate Risk of Fall with Harm to all staff/Discuss with provider need for PT consult/Monitor gait and stability/Provide patient with walking aids - walker, cane, crutches/Reinforce activity limits and safety measures with patient and family/Tailored Fall Risk Interventions/Visual Cue: Yellow wristband and red socks/Bed in lowest position, wheels locked, appropriate side rails in place/Call bell, personal items and telephone in reach/Instruct patient to call for assistance before getting out of bed or chair/Non-slip footwear when patient is out of bed/[place] to call system/Physically safe environment - no spills, clutter or unnecessary equipment/Purposeful Proactive Rounding/Room/bathroom lighting operational, light cord in reach Assistance OOB with selected safe patient handling equipment/Communicate Risk of Fall with Harm to all staff/Discuss with provider need for PT consult/Monitor gait and stability/Provide patient with walking aids - walker, cane, crutches/Reinforce activity limits and safety measures with patient and family/Tailored Fall Risk Interventions/Visual Cue: Yellow wristband and red socks/Bed in lowest position, wheels locked, appropriate side rails in place/Call bell, personal items and telephone in reach/Instruct patient to call for assistance before getting out of bed or chair/Non-slip footwear when patient is out of bed/[place] to call system/Physically safe environment - no spills, clutter or unnecessary equipment/Purposeful Proactive Rounding/Room/bathroom lighting operational, light cord in reach

## 2024-01-04 NOTE — H&P ADULT - PROBLEM SELECTOR PLAN 1
- Low concern for bacterial infx: CXR clear and no leukocytosis  - Patient is NOT hypoxic with saturations holding in the 90s  - Symptomatic management: standing DuoNebs and anti-tussives  - Can likely begin discharge planning if doesn't require O2

## 2024-01-04 NOTE — ED PROVIDER NOTE - ATTENDING CONTRIBUTION TO CARE
I performed a face to face history and physical exam of the patient and discussed their management with the resident. I reviewed the resident's note and agree with the documented findings and plan of care.     Dr. Boss: 88-year-old female history of hypertension, diabetes, A-fib, heart failure, on Xarelto, hypothyroidism, s/p PPM presents for evaluation of shortness of breath associated with productive cough, congestion, and worsening hypoxia onset 5 days ago. RSV+ on outpatient testing. 89% on RA, improves on 2L NC.  Patient with end expiratory wheezes in all lung fields, no history of asthma COPD or smoking.    Differential diagnosis includes but is not limited to hypoxia secondary to RSV versus other virus versus bacterial pneumonia, cardiac wheezes from acute CHF exacerbation, metabolic derangement in setting of patient's illness.    Will do sepsis workup, troponin, BNP, EKG, chest x-ray.  Will give DuoNebs will give 500 cc of fluid given patient's CHF history.    Admission

## 2024-01-04 NOTE — ED PROVIDER NOTE - OBJECTIVE STATEMENT
88-year-old female history of hypertension, diabetes, A-fib, heart failure, on Xarelto, hypothyroidism, s/p PPM presents for evaluation of shortness of breath associated with productive cough, congestion, and worsening hypoxia onset 5 days ago.  Patient tested positive for RSV several days ago.  Son at bedside states that patient was satting 80% on room air today prior to arrival prompting her visit to the emergency room.  EMS reports patient was 89 % on room air when they arrived, improved to 97% after 2 L nasal cannula.  Patient denies chest pain, fever, abdominal pain, nausea, vomiting, diarrhea, new leg swelling.

## 2024-01-04 NOTE — ED ADULT NURSE NOTE - OBJECTIVE STATEMENT
87 y/o female presents to ED via EMS from home c/o SOB, productive cough, generalized weakness/lethargy, low SpO2 at home. Tested positive for RSV outpt. Son at bedside, reports pt has 24 hour aides at home, ambulates with walker normally but has needed more assistance recently. A&Ox4, breathing unlabored, +hypoxic to 88% on RA, improves with 3L NC. Abd soft nontender, skin warm dry and intact.

## 2024-01-04 NOTE — H&P ADULT - HISTORY OF PRESENT ILLNESS
88-year-old female history of hypertension, diabetes, A-fib, heart failure, on Xarelto, hypothyroidism, s/p PPM presents for evaluation of shortness of breath associated with productive cough, congestion, and worsening hypoxia onset 5 days ago.  Patient tested positive for RSV several days ago.  Son at bedside states that patient was satting 80% on room air today prior to arrival prompting her visit to the emergency room.  EMS reports patient was 89 % on room air when they arrived, improved to 97% after 2 L nasal cannula.  Patient denies chest pain, fever, abdominal pain, nausea, vomiting, diarrhea, new leg swelling. 88F with PMHx of HTN, chronic atrial fibrillation (with PPM), hypothyroidism, and dementia (dependent on others for ADLs) presenting for one week history of cough and weakness in setting of RSV infection. Patient began to develop cough several days prior. Also ambulates with a walker, but has been more weak. They called their PMD Renita Jack who performed outpatient viral testing and patient was RSV positive. Initially at home was trying Mucinex and keeping in touch with PMD. They noted O2 was initially 96%, but became concerned when it dropped down to 90%. They called their PMD who referred here. In the ED was given DuoNeb, 500 cc NS, and Tylenol. History from patient limited, but she complains of cough. Son at bedside providing collateral.

## 2024-01-04 NOTE — ED PROVIDER NOTE - CLINICAL SUMMARY MEDICAL DECISION MAKING FREE TEXT BOX
88-year-old female history of hypertension, diabetes, A-fib, heart failure, on Xarelto, hypothyroidism, s/p PPM presents for evaluation of shortness of breath associated with productive cough, congestion, and worsening hypoxia onset 5 days ago. RSV+ on outpatient testing. 89% on RA, improves on 2L NC.  Patient with end expiratory wheezes in all lung fields, no history of asthma COPD or smoking.    Differential diagnosis includes but is not limited to hypoxia secondary to RSV versus other virus versus bacterial pneumonia versus cardiac wheezes from acute CHF exacerbation versus metabolic derangement in setting of patient's illness.  Plan for sepsis workup, troponin, BNP, EKG, chest x-ray.  Will give DuoNebs to improve patient's wheezing, will give 500 cc of fluid given patient's CHF history.  Patient will need to be admitted.  PCP is Renita Jack

## 2024-01-04 NOTE — ED PROVIDER NOTE - PROGRESS NOTE DETAILS
Janell Prajapati MD, PGY2: reviewed results of labs and imaging, trop from 16->20 will continue to trend, pt accepted for admission.

## 2024-01-04 NOTE — ED ADULT NURSE NOTE - NSFALLHARMRISKINTERV_ED_ALL_ED
Assistance OOB with selected safe patient handling equipment if applicable/Assistance with ambulation/Communicate risk of Fall with Harm to all staff, patient, and family/Monitor gait and stability/Provide patient with walking aids/Provide visual cue: red socks, yellow wristband, yellow gown, etc/Reinforce activity limits and safety measures with patient and family/Bed in lowest position, wheels locked, appropriate side rails in place/Call bell, personal items and telephone in reach/Instruct patient to call for assistance before getting out of bed/chair/stretcher/Non-slip footwear applied when patient is off stretcher/[place] to call system/Physically safe environment - no spills, clutter or unnecessary equipment/Purposeful Proactive Rounding/Room/bathroom lighting operational, light cord in reach Assistance OOB with selected safe patient handling equipment if applicable/Assistance with ambulation/Communicate risk of Fall with Harm to all staff, patient, and family/Monitor gait and stability/Provide patient with walking aids/Provide visual cue: red socks, yellow wristband, yellow gown, etc/Reinforce activity limits and safety measures with patient and family/Bed in lowest position, wheels locked, appropriate side rails in place/Call bell, personal items and telephone in reach/Instruct patient to call for assistance before getting out of bed/chair/stretcher/Non-slip footwear applied when patient is off stretcher/[place] to call system/Physically safe environment - no spills, clutter or unnecessary equipment/Purposeful Proactive Rounding/Room/bathroom lighting operational, light cord in reach

## 2024-01-04 NOTE — ED ADULT NURSE REASSESSMENT NOTE - NS ED NURSE REASSESS COMMENT FT1
pt straight catheterized using sterile technique with 2 RNs present. pt educated on procedure and tolerated well. 350ml of clear yellow urine obtained, ua/uc sent to lab. pt repositioned in bed, placed on primafit, siderails up x2. call bell in reach. bed locked in lowest position.

## 2024-01-04 NOTE — ED ADULT NURSE NOTE - NSFALLOOBATTEMPT_ED_ALL_ED
Patient Specific Counseling (Will Not Stick From Patient To Patient): Recommended that we treat with LN2. He agreed. Detail Level: Generalized Patient Specific Counseling (Will Not Stick From Patient To Patient): Multi site-reassured him no signs of recurrence on exam. See skin cancer log. Patient Specific Counseling (Will Not Stick From Patient To Patient): Multiple sites-see skin cancer log. Reassured him that there is no signs of recurrence. No Patient Specific Counseling (Will Not Stick From Patient To Patient): Recommended Aquaphor spray OTC for a moisturizer. Coupon given. Detail Level: Simple

## 2024-01-04 NOTE — H&P ADULT - NSHPPHYSICALEXAM_GEN_ALL_CORE
T(C): 37.1 (01-04-24 @ 13:54), Max: 38.1 (01-04-24 @ 12:00)  HR: 75 (01-04-24 @ 13:54) (75 - 84)  BP: 116/71 (01-04-24 @ 13:54) (116/71 - 153/92)  RR: 20 (01-04-24 @ 13:54) (20 - 24)  SpO2: 98% (01-04-24 @ 13:54) (95% - 99%)    GEN: (fe)male in NAD, appears comfortable, no diaphoresis  EYES: No scleral injection, PERRL, EOMI  ENTM: neck supple & symmetric without tracheal deviation, moist membranes, no gross hearing impairment, thyroid gland not enlarged  CV: +S1/S2, no m/r/g, no abdominal bruit, no LE edema  RESP: breathing comfortably, no respiratory accessory muscle use, CTAB, no w/r/r  GI: normoactive BS, soft, NTND, no rebounding/guarding, no palpable masses  LYMPHATICS: no LAD or tenderness to palpation  NEURO: AOx3, no focal deficits, CNII-XII grossly intact  PSYCH: No SI/HI/AVH, appropriate affect, appropriate insight/judgment   SKIN: no petechiae, ecchymosis or maculopapular rash noted T(C): 37.1 (01-04-24 @ 13:54), Max: 38.1 (01-04-24 @ 12:00)  HR: 75 (01-04-24 @ 13:54) (75 - 84)  BP: 116/71 (01-04-24 @ 13:54) (116/71 - 153/92)  RR: 20 (01-04-24 @ 13:54) (20 - 24)  SpO2: 98% (01-04-24 @ 13:54) (95% - 99%)    GEN: male in NAD, appears comfortable, no diaphoresis  EYES: No scleral injection, PERRL, EOMI  ENTM: neck supple & symmetric without tracheal deviation, moist membranes, no gross hearing impairment, thyroid gland not enlarged  CV: +S1/S2, no m/r/g, no abdominal bruit, no LE edema  RESP: breathing comfortably, no respiratory accessory muscle use, +coarse breath sounds bilateral  GI: normoactive BS, soft, NTND, no rebounding/guarding, no palpable masses  LYMPHATICS: no LAD or tenderness to palpation  NEURO: AOx1, no focal deficits, CNII-XII grossly intact  PSYCH: No SI/HI/AVH, appropriate affect, poor insight/judgment   SKIN: no petechiae, ecchymosis or maculopapular rash noted

## 2024-01-04 NOTE — H&P ADULT - PROBLEM SELECTOR PLAN 5
- Fully dependent on others for ADLs, but ambulates with RW  - PT consult  - CM consult to reinstate extensive aides

## 2024-01-05 ENCOUNTER — LABORATORY RESULT (OUTPATIENT)
Age: 89
End: 2024-01-05

## 2024-01-05 DIAGNOSIS — S93.409A SPRAIN OF UNSPECIFIED LIGAMENT OF UNSPECIFIED ANKLE, INITIAL ENCOUNTER: ICD-10-CM

## 2024-01-05 DIAGNOSIS — Z29.9 ENCOUNTER FOR PROPHYLACTIC MEASURES, UNSPECIFIED: ICD-10-CM

## 2024-01-05 LAB
ANION GAP SERPL CALC-SCNC: 5 MMOL/L — SIGNIFICANT CHANGE UP (ref 5–17)
ANION GAP SERPL CALC-SCNC: 5 MMOL/L — SIGNIFICANT CHANGE UP (ref 5–17)
BUN SERPL-MCNC: 13 MG/DL — SIGNIFICANT CHANGE UP (ref 7–23)
BUN SERPL-MCNC: 13 MG/DL — SIGNIFICANT CHANGE UP (ref 7–23)
CALCIUM SERPL-MCNC: 9.8 MG/DL — SIGNIFICANT CHANGE UP (ref 8.4–10.5)
CALCIUM SERPL-MCNC: 9.8 MG/DL — SIGNIFICANT CHANGE UP (ref 8.4–10.5)
CHLORIDE SERPL-SCNC: 102 MMOL/L — SIGNIFICANT CHANGE UP (ref 96–108)
CHLORIDE SERPL-SCNC: 102 MMOL/L — SIGNIFICANT CHANGE UP (ref 96–108)
CO2 SERPL-SCNC: 30 MMOL/L — SIGNIFICANT CHANGE UP (ref 22–31)
CO2 SERPL-SCNC: 30 MMOL/L — SIGNIFICANT CHANGE UP (ref 22–31)
CREAT SERPL-MCNC: 0.44 MG/DL — LOW (ref 0.5–1.3)
CREAT SERPL-MCNC: 0.44 MG/DL — LOW (ref 0.5–1.3)
CULTURE RESULTS: NO GROWTH — SIGNIFICANT CHANGE UP
CULTURE RESULTS: NO GROWTH — SIGNIFICANT CHANGE UP
EGFR: 94 ML/MIN/1.73M2 — SIGNIFICANT CHANGE UP
EGFR: 94 ML/MIN/1.73M2 — SIGNIFICANT CHANGE UP
GLUCOSE SERPL-MCNC: 88 MG/DL — SIGNIFICANT CHANGE UP (ref 70–99)
GLUCOSE SERPL-MCNC: 88 MG/DL — SIGNIFICANT CHANGE UP (ref 70–99)
HCT VFR BLD CALC: 41 % — SIGNIFICANT CHANGE UP (ref 34.5–45)
HCT VFR BLD CALC: 41 % — SIGNIFICANT CHANGE UP (ref 34.5–45)
HGB BLD-MCNC: 13.3 G/DL — SIGNIFICANT CHANGE UP (ref 11.5–15.5)
HGB BLD-MCNC: 13.3 G/DL — SIGNIFICANT CHANGE UP (ref 11.5–15.5)
MCHC RBC-ENTMCNC: 29.9 PG — SIGNIFICANT CHANGE UP (ref 27–34)
MCHC RBC-ENTMCNC: 29.9 PG — SIGNIFICANT CHANGE UP (ref 27–34)
MCHC RBC-ENTMCNC: 32.4 GM/DL — SIGNIFICANT CHANGE UP (ref 32–36)
MCHC RBC-ENTMCNC: 32.4 GM/DL — SIGNIFICANT CHANGE UP (ref 32–36)
MCV RBC AUTO: 92.1 FL — SIGNIFICANT CHANGE UP (ref 80–100)
MCV RBC AUTO: 92.1 FL — SIGNIFICANT CHANGE UP (ref 80–100)
NRBC # BLD: 0 /100 WBCS — SIGNIFICANT CHANGE UP (ref 0–0)
NRBC # BLD: 0 /100 WBCS — SIGNIFICANT CHANGE UP (ref 0–0)
PLATELET # BLD AUTO: 148 K/UL — LOW (ref 150–400)
PLATELET # BLD AUTO: 148 K/UL — LOW (ref 150–400)
POTASSIUM SERPL-MCNC: 4.3 MMOL/L — SIGNIFICANT CHANGE UP (ref 3.5–5.3)
POTASSIUM SERPL-MCNC: 4.3 MMOL/L — SIGNIFICANT CHANGE UP (ref 3.5–5.3)
POTASSIUM SERPL-SCNC: 4.3 MMOL/L — SIGNIFICANT CHANGE UP (ref 3.5–5.3)
POTASSIUM SERPL-SCNC: 4.3 MMOL/L — SIGNIFICANT CHANGE UP (ref 3.5–5.3)
RBC # BLD: 4.45 M/UL — SIGNIFICANT CHANGE UP (ref 3.8–5.2)
RBC # BLD: 4.45 M/UL — SIGNIFICANT CHANGE UP (ref 3.8–5.2)
RBC # FLD: 13.1 % — SIGNIFICANT CHANGE UP (ref 10.3–14.5)
RBC # FLD: 13.1 % — SIGNIFICANT CHANGE UP (ref 10.3–14.5)
SODIUM SERPL-SCNC: 137 MMOL/L — SIGNIFICANT CHANGE UP (ref 135–145)
SODIUM SERPL-SCNC: 137 MMOL/L — SIGNIFICANT CHANGE UP (ref 135–145)
SPECIMEN SOURCE: SIGNIFICANT CHANGE UP
SPECIMEN SOURCE: SIGNIFICANT CHANGE UP
WBC # BLD: 4.72 K/UL — SIGNIFICANT CHANGE UP (ref 3.8–10.5)
WBC # BLD: 4.72 K/UL — SIGNIFICANT CHANGE UP (ref 3.8–10.5)
WBC # FLD AUTO: 4.72 K/UL — SIGNIFICANT CHANGE UP (ref 3.8–10.5)
WBC # FLD AUTO: 4.72 K/UL — SIGNIFICANT CHANGE UP (ref 3.8–10.5)

## 2024-01-05 PROCEDURE — 99232 SBSQ HOSP IP/OBS MODERATE 35: CPT

## 2024-01-05 RX ORDER — ASCORBIC ACID 60 MG
500 TABLET,CHEWABLE ORAL DAILY
Refills: 0 | Status: DISCONTINUED | OUTPATIENT
Start: 2024-01-05 | End: 2024-01-09

## 2024-01-05 RX ORDER — CHLORHEXIDINE GLUCONATE 213 G/1000ML
1 SOLUTION TOPICAL
Refills: 0 | Status: DISCONTINUED | OUTPATIENT
Start: 2024-01-05 | End: 2024-01-09

## 2024-01-05 RX ADMIN — CARVEDILOL PHOSPHATE 3.12 MILLIGRAM(S): 80 CAPSULE, EXTENDED RELEASE ORAL at 05:15

## 2024-01-05 RX ADMIN — RIVAROXABAN 20 MILLIGRAM(S): KIT at 17:25

## 2024-01-05 RX ADMIN — Medication 3 MILLILITER(S): at 05:18

## 2024-01-05 RX ADMIN — Medication 600 MILLIGRAM(S): at 17:26

## 2024-01-05 RX ADMIN — Medication 600 MILLIGRAM(S): at 05:15

## 2024-01-05 RX ADMIN — Medication 3 MILLILITER(S): at 17:26

## 2024-01-05 RX ADMIN — Medication 40 MILLIGRAM(S): at 14:03

## 2024-01-05 RX ADMIN — Medication 3 MILLILITER(S): at 12:17

## 2024-01-05 RX ADMIN — Medication 75 MICROGRAM(S): at 05:15

## 2024-01-05 RX ADMIN — Medication 3 MILLILITER(S): at 00:14

## 2024-01-05 RX ADMIN — CARVEDILOL PHOSPHATE 3.12 MILLIGRAM(S): 80 CAPSULE, EXTENDED RELEASE ORAL at 17:26

## 2024-01-05 NOTE — DIETITIAN INITIAL EVALUATION ADULT - NSFNSNUTRHOMESUPPLEMENTFT_GEN_A_CORE
Pt reports taking vitamins PTA but unable to recall the names of which. Per H&P, cholecalciferol, Vitamin C, and folic acid noted.

## 2024-01-05 NOTE — DIETITIAN INITIAL EVALUATION ADULT - OTHER INFO
Reports -140 lbs. Denies recent wt changes. States many years ago she weighed >200 lbs, but is now maintaining her current weight.  Dosing wt: 119.9 lbs (01-04, stated)  Wt history per chart: 147.7 lbs (01-05, RD obtained bedscale wt, may be overestimating in setting of bedscale discrepancies), 134 lbs (03/19/23), 137 lbs (12/03/22), 140 lbs (11/01/22), 138 lbs (09/02/22). RD to continue to monitor weight trends as able/available.     Additional nutrition-related concerns:  - Pt currently ordered for prednisone. BG readings  mg/dl in-house.   - Pt currently ordered for PO synthroid.

## 2024-01-05 NOTE — ADVANCED PRACTICE NURSE CONSULT - ASSESSMENT
arrived on unit, patient was found lying in a low air loss pressure redistribution support surface style bed.  patient  is unable to turn independently and staff assistance x 1was provided. Once turned able to view her skin. patient reports "she been less active she been feeling sick".  bilateral  sacrum /buttocks non-blanchable deep red  discoloration and hyperpigmentation approximately 6 cm x 6cm x 0 cm this is consistent  this deep tissue injury,  present on admission.  left heel   there is non- blanchable deep red discoloration  noted to measure approximately 4 cm x 3 cm x 0cm.  consistent  this deep tissue injury,  present on admission.   right  heel  there is non- blanchable deep  red discoloration  noted to measure approximately 4 cm x 3 cm x 0cm.  consistent  this deep tissue injury,  present on admission.    patient  was educated  on the importance  for turning  and positioning  every 2 hours. The use of waffle cushion when out of bed  to chair,  and to shift her Weight every 2 hours while in chair. The importance a keeping her  skin clean and dry and  to offload left  heel/ foot,  and optimal nutrition.

## 2024-01-05 NOTE — DIETITIAN INITIAL EVALUATION ADULT - NSFNSGIIOFT_GEN_A_CORE
Denies nausea, vomiting, constipation, diarrhea. Pt unsure of last BM, none yet noted per chart. Pt not currently ordered for bowel regimen.

## 2024-01-05 NOTE — DIETITIAN INITIAL EVALUATION ADULT - NSFNSADHERENCEPTAFT_GEN_A_CORE
Hx of DM noted per previous admissions. No DM medications noted per H&P.  Hx CHF noted per previous admissions as well. Per H&P, pt taking spironolactone PTA.

## 2024-01-05 NOTE — DIETITIAN INITIAL EVALUATION ADULT - NSFNSPHYEXAMSKINFT_GEN_A_CORE
Pressure Injury 1: sacrum, Suspected deep tissue injury  Pressure Injury 2: Bilateral:, heel, Suspected deep tissue injury    Per wound care note 1/5:  -bilateral sacrum/buttocks deep tissue injury, present on admission.  -Right heel deep tissue injury, present on admission.  -Left heel deep tissue injury , present on admission.

## 2024-01-05 NOTE — DIETITIAN INITIAL EVALUATION ADULT - PERSON TAUGHT/METHOD
Briefly discussed importance of adequate protein-energy consumption to meet estimated nutrient needs. Encouraged intake of meals as tolerated. Pt made aware RD remains available for further questions/concerns./verbal instruction/patient instructed

## 2024-01-05 NOTE — PROGRESS NOTE ADULT - PROBLEM SELECTOR PLAN 5
C/o ankle pain. On exam, limited ROM d/t pain  - will obtain x rays of ankles - Fully dependent on others for ADLs, but ambulates with RW  - PT consult

## 2024-01-05 NOTE — DIETITIAN INITIAL EVALUATION ADULT - REASON FOR ADMISSION
Hypoxemia    Per chart, pt is an 88 year old female with PMHx of HTN, chronic atrial fibrillation (with PPM), hypothyroidism, and dementia (dependent on others for ADLs) presenting for one week history of cough and weakness in setting of RSV infection.

## 2024-01-05 NOTE — PROGRESS NOTE ADULT - PROBLEM SELECTOR PLAN 6
- Fully dependent on others for ADLs, but ambulates with RW  - PT consult - DVT ppx: home xarelto for hx of a fib  - GI ppx: none  - Diet: regular

## 2024-01-05 NOTE — PHYSICAL THERAPY INITIAL EVALUATION ADULT - ADDITIONAL COMMENTS
Pt lives in a private house, 16 steps of stairs to negotiate. Pt has 24/7 Pt lives in a private house, 16 steps of stairs to negotiate. Pt has 24/7 private aide who assists pt in mobility & function

## 2024-01-05 NOTE — PHYSICAL THERAPY INITIAL EVALUATION ADULT - PERTINENT HX OF CURRENT PROBLEM, REHAB EVAL
88F with PMHx of HTN, chronic atrial fibrillation (with PPM), hypothyroidism, and dementia (dependent on others for ADLs) presenting for one week history of cough and weakness in setting of RSV infection. Patient began to develop cough several days prior. Also ambulates with a walker, but has been more weak. They called their PMD Renita Jack who performed outpatient viral testing and patient was RSV positive. Initially at home was trying Mucinex and keeping in touch with PMD. They noted O2 was initially 96%, but became concerned when it dropped down to 90%. They called their PMD who referred here. In the ED was given DuoNeb, 500 cc NS, and Tylenol. History from patient limited, but she complains of cough. Son at bedside providing collateral. 88F with PMHx of HTN, chronic atrial fibrillation (with PPM), hypothyroidism, and dementia (dependent on others for ADLs) presenting for one week history of cough and weakness in setting of RSV infection. Patient began to develop cough several days prior. Also ambulates with a walker, but has been more weak. They called their PMD Renita Jack who performed outpatient viral testing and patient was RSV positive. Initially at home was trying Mucinex and keeping in touch with PMD. They noted O2 was initially 96%, but became concerned when it dropped down to 90%. They called their PMD who referred here. In the ED was given DuoNeb, 500 cc NS, and Tylenol. History from patient limited, but she complains of cough. Son at bedside providing collateral.    CHEST XRAY 1/4: No focal consolidation.

## 2024-01-05 NOTE — DIETITIAN INITIAL EVALUATION ADULT - PERTINENT LABORATORY DATA
01-05    137  |  102  |  13  ----------------------------<  88  4.3   |  30  |  0.44<L>    Ca    9.8      05 Jan 2024 07:29  Mg     1.8     01-04    TPro  7.4  /  Alb  3.9  /  TBili  0.4  /  DBili  x   /  AST  19  /  ALT  14  /  AlkPhos  112  01-04

## 2024-01-05 NOTE — PROGRESS NOTE ADULT - SUBJECTIVE AND OBJECTIVE BOX
Lashay Dias MD  Hospitalist  Available on MS Teams      PROGRESS NOTE:     Patient is a 88y old  Female who presents with a chief complaint of Cough/Weakness (04 Jan 2024 16:41)      SUBJECTIVE / OVERNIGHT EVENTS:  No acute events overnight. Pt states her shortness of breath is stable from prior days. C/o bilateral ankle pain.     The patient SpO2 was down to 90% on room air this morning. She was placed back on 2L of O2 via nasal canula with good response.     MEDICATIONS  (STANDING):  albuterol/ipratropium for Nebulization 3 milliLiter(s) Nebulizer every 6 hours  carvedilol 3.125 milliGRAM(s) Oral every 12 hours  chlorhexidine 2% Cloths 1 Application(s) Topical <User Schedule>  guaiFENesin  milliGRAM(s) Oral every 12 hours  levothyroxine 75 MICROGram(s) Oral daily  predniSONE   Tablet 40 milliGRAM(s) Oral daily  rivaroxaban 20 milliGRAM(s) Oral with dinner    MEDICATIONS  (PRN):  acetaminophen     Tablet .. 650 milliGRAM(s) Oral every 6 hours PRN Temp greater or equal to 38C (100.4F), Mild Pain (1 - 3), Moderate Pain (4 - 6), Severe Pain (7 - 10)      CAPILLARY BLOOD GLUCOSE        I&O's Summary      PHYSICAL EXAM:  Vital Signs Last 24 Hrs  T(C): 36.9 (05 Jan 2024 04:00), Max: 37.1 (04 Jan 2024 18:09)  T(F): 98.5 (05 Jan 2024 04:00), Max: 98.7 (04 Jan 2024 18:09)  HR: 74 (05 Jan 2024 11:33) (70 - 81)  BP: 117/75 (05 Jan 2024 11:33) (117/75 - 128/77)  BP(mean): --  RR: 18 (05 Jan 2024 11:33) (17 - 20)  SpO2: 96% (05 Jan 2024 11:33) (90% - 99%)    Parameters below as of 05 Jan 2024 11:33  Patient On (Oxygen Delivery Method): nasal cannula  O2 Flow (L/min): 2      CONSTITUTIONAL: Well-developed, well-groomed, in no apparent distress  EYES: No conjunctival or scleral injection  RESPIRATORY: Breathing comfortably; +expiratory wheezing bialterally  CARDIOVASCULAR: +S1S2, RRR, no M/G/R; no lower extremity edema  GASTROINTESTINAL: soft, nontender, nondistended  MUSCULOSKELETAL: Discomfort with passive range of motion about the bilateral ankles  NEUROLOGIC: CN II-XII intact; normal reflexes in upper and lower extremities; sensation intact in LEs b/l to light touch  PSYCHIATRIC: alert and oriented; mood and affect appropriate; appropriate insight and judgment    LABS:                        13.3   4.72  )-----------( 148      ( 05 Jan 2024 07:34 )             41.0     01-05    137  |  102  |  13  ----------------------------<  88  4.3   |  30  |  0.44<L>    Ca    9.8      05 Jan 2024 07:29  Mg     1.8     01-04    TPro  7.4  /  Alb  3.9  /  TBili  0.4  /  DBili  x   /  AST  19  /  ALT  14  /  AlkPhos  112  01-04    PT/INR - ( 04 Jan 2024 12:09 )   PT: 15.6 sec;   INR: 1.50 ratio         PTT - ( 04 Jan 2024 12:09 )  PTT:33.9 sec      Urinalysis Basic - ( 05 Jan 2024 07:29 )    Color: x / Appearance: x / SG: x / pH: x  Gluc: 88 mg/dL / Ketone: x  / Bili: x / Urobili: x   Blood: x / Protein: x / Nitrite: x   Leuk Esterase: x / RBC: x / WBC x   Sq Epi: x / Non Sq Epi: x / Bacteria: x       Lashay Dias MD  Hospitalist  Available on MS Teams      PROGRESS NOTE:     Patient is a 88y old  Female who presents with a chief complaint of Cough/Weakness (04 Jan 2024 16:41)      SUBJECTIVE / OVERNIGHT EVENTS:  No acute events overnight. Pt states her shortness of breath is stable from prior days. C/o bilateral ankle pain.     The patient SpO2 was down to 90% on room air this morning. She was placed back on 2L of O2 via nasal canula with good response.     MEDICATIONS  (STANDING):  albuterol/ipratropium for Nebulization 3 milliLiter(s) Nebulizer every 6 hours  carvedilol 3.125 milliGRAM(s) Oral every 12 hours  chlorhexidine 2% Cloths 1 Application(s) Topical <User Schedule>  guaiFENesin  milliGRAM(s) Oral every 12 hours  levothyroxine 75 MICROGram(s) Oral daily  predniSONE   Tablet 40 milliGRAM(s) Oral daily  rivaroxaban 20 milliGRAM(s) Oral with dinner    MEDICATIONS  (PRN):  acetaminophen     Tablet .. 650 milliGRAM(s) Oral every 6 hours PRN Temp greater or equal to 38C (100.4F), Mild Pain (1 - 3), Moderate Pain (4 - 6), Severe Pain (7 - 10)      CAPILLARY BLOOD GLUCOSE        I&O's Summary      PHYSICAL EXAM:  Vital Signs Last 24 Hrs  T(C): 36.9 (05 Jan 2024 04:00), Max: 37.1 (04 Jan 2024 18:09)  T(F): 98.5 (05 Jan 2024 04:00), Max: 98.7 (04 Jan 2024 18:09)  HR: 74 (05 Jan 2024 11:33) (70 - 81)  BP: 117/75 (05 Jan 2024 11:33) (117/75 - 128/77)  BP(mean): --  RR: 18 (05 Jan 2024 11:33) (17 - 20)  SpO2: 96% (05 Jan 2024 11:33) (90% - 99%)    Parameters below as of 05 Jan 2024 11:33  Patient On (Oxygen Delivery Method): nasal cannula  O2 Flow (L/min): 2      CONSTITUTIONAL: Well-developed, well-groomed, in no apparent distress  EYES: No conjunctival or scleral injection  RESPIRATORY: Breathing comfortably; +expiratory wheezing bialterally  CARDIOVASCULAR: +S1S2, RRR, no M/G/R; no lower extremity edema  GASTROINTESTINAL: soft, nontender, nondistended  NEUROLOGIC: CN II-XII intact; normal reflexes in upper and lower extremities; sensation intact in LEs b/l to light touch  PSYCHIATRIC: alert and oriented; mood and affect appropriate; appropriate insight and judgment    LABS:                        13.3   4.72  )-----------( 148      ( 05 Jan 2024 07:34 )             41.0     01-05    137  |  102  |  13  ----------------------------<  88  4.3   |  30  |  0.44<L>    Ca    9.8      05 Jan 2024 07:29  Mg     1.8     01-04    TPro  7.4  /  Alb  3.9  /  TBili  0.4  /  DBili  x   /  AST  19  /  ALT  14  /  AlkPhos  112  01-04    PT/INR - ( 04 Jan 2024 12:09 )   PT: 15.6 sec;   INR: 1.50 ratio         PTT - ( 04 Jan 2024 12:09 )  PTT:33.9 sec      Urinalysis Basic - ( 05 Jan 2024 07:29 )    Color: x / Appearance: x / SG: x / pH: x  Gluc: 88 mg/dL / Ketone: x  / Bili: x / Urobili: x   Blood: x / Protein: x / Nitrite: x   Leuk Esterase: x / RBC: x / WBC x   Sq Epi: x / Non Sq Epi: x / Bacteria: x

## 2024-01-05 NOTE — DIETITIAN INITIAL EVALUATION ADULT - ORAL INTAKE PTA/DIET HISTORY
Pt reports very good appetite/PO intake PTA, states "sometimes I eat more than 3 meals/day." Was not following any therapeutic diet PTA.   States she has a shellfish allergy (rxn: redness, itchiness).

## 2024-01-05 NOTE — DIETITIAN INITIAL EVALUATION ADULT - OTHER CALCULATIONS
Fluid needs deferred to team. Energy and protein needs based on reported lower UBW: 135 lbs/61.2 kg.

## 2024-01-05 NOTE — ADVANCED PRACTICE NURSE CONSULT - RECOMMEDATIONS
Impression  bilateral sacrum/buttocks deep tissue injury, present on admission.  Right heel deep tissue injury, present on admission.  Left heel deep tissue injury , present on admission.     Recommendations   1. Bilateral  , sacral / buttocks  deep tissue injury  Topical therapy- sacral/bilateral buttocks- cleanse w/incontinent cleanser, pat dry & apply alban cream  twice daily & prn soiling .  monitor    for changes .  2. Right and left heel  deep tissue injury  ( A). Cleans heels  with normal saline pat dry and apply no sting barrier film ( Cavilon ) daily and monitor for changes .  (B)Elevate heels; apply Complete Cair air fluidized boots; ensure that the soles of the feet are not resting on the foot board of the bed.  3  Incontinent management - incontinent cleanser, pads,  myrtle care  BID  4. Maintain on an alternating air with low air loss surface   5. Turn & reposition every 2 hr; Use positioning pillow to turn and reposition, soft pillow between bony prominences; continue measures to decrease friction/shear/pressure.  6. Nutrition optimization.  7. Place  waffle cushion when out of bed to chair .   plan of care reviewed with covering Staff RN.

## 2024-01-05 NOTE — PROGRESS NOTE ADULT - PROBLEM SELECTOR PLAN 1
- Low concern for bacterial infx: CXR clear and no leukocytosis  - Patient is NOT hypoxic with saturations holding in the 90s  - Symptomatic management: standing DuoNebs and anti-tussives  - will start prednisone 40mg daily x 5 days for persistent wheezing  - Ambulatory SpO2 90% on room air

## 2024-01-05 NOTE — DIETITIAN INITIAL EVALUATION ADULT - PERTINENT MEDS FT
MEDICATIONS  (STANDING):  albuterol/ipratropium for Nebulization 3 milliLiter(s) Nebulizer every 6 hours  carvedilol 3.125 milliGRAM(s) Oral every 12 hours  chlorhexidine 2% Cloths 1 Application(s) Topical <User Schedule>  guaiFENesin  milliGRAM(s) Oral every 12 hours  levothyroxine 75 MICROGram(s) Oral daily  predniSONE   Tablet 40 milliGRAM(s) Oral daily  rivaroxaban 20 milliGRAM(s) Oral with dinner    MEDICATIONS  (PRN):  acetaminophen     Tablet .. 650 milliGRAM(s) Oral every 6 hours PRN Temp greater or equal to 38C (100.4F), Mild Pain (1 - 3), Moderate Pain (4 - 6), Severe Pain (7 - 10)

## 2024-01-05 NOTE — DIETITIAN INITIAL EVALUATION ADULT - FEEDING SKILL
Reports she needs assistance with tray set-up. Will reach out to [employer] program in-house for volunteer meal assistance

## 2024-01-05 NOTE — DIETITIAN INITIAL EVALUATION ADULT - ADD RECOMMEND
1) Continue Regular diet.   2) Recommend a multivitamin and Vitamin C, pending no medical contraindications, to aid in wound healing.  3) Monitor PO intake, GI tolerance, skin integrity, labs, weight, and bowel movement regularity.   4) Honor food preferences as feasible. Assist with meals PRN and encourage PO intake.  5) RD remains available upon request and will follow-up per protocol.

## 2024-01-05 NOTE — ADVANCED PRACTICE NURSE CONSULT - REASON FOR CONSULT
Consult to assess patient skin initiated by RN for sacrum deep tissue injury ,present on admission.      History of Present Illness:  Reason for Admission: Cough/Weakness  History of Present Illness:   88F with PMHx of HTN, chronic atrial fibrillation (with PPM), hypothyroidism, and dementia (dependent on others for ADLs) presenting for one week history of cough and weakness in setting of RSV infection. Patient began to develop cough several days prior. Also ambulates with a walker, but has been more weak. They called their PMD Renita Jack who performed outpatient viral testing and patient was RSV positive. Initially at home was trying Mucinex and keeping in touch with PMD. They noted O2 was initially 96%, but became concerned when it dropped down to 90%. They called their PMD who referred here. In the ED was given DuoNeb, 500 cc NS, and Tylenol. History from patient limited, but she complains of cough. Son at bedside providing collateral.

## 2024-01-05 NOTE — PHYSICAL THERAPY INITIAL EVALUATION ADULT - PLANNED THERAPY INTERVENTIONS, PT EVAL
stairs: GOAL: Pt will negotiate 1 flightof stairs with or without appropriate assistive device and handrail via reciprocal/step-to technique with min A in 2 weeks./balance training/bed mobility training/gait training/transfer training

## 2024-01-06 DIAGNOSIS — J96.91 RESPIRATORY FAILURE, UNSPECIFIED WITH HYPOXIA: ICD-10-CM

## 2024-01-06 LAB
MRSA PCR RESULT.: SIGNIFICANT CHANGE UP
MRSA PCR RESULT.: SIGNIFICANT CHANGE UP
S AUREUS DNA NOSE QL NAA+PROBE: DETECTED
S AUREUS DNA NOSE QL NAA+PROBE: DETECTED

## 2024-01-06 PROCEDURE — 93010 ELECTROCARDIOGRAM REPORT: CPT

## 2024-01-06 PROCEDURE — 99233 SBSQ HOSP IP/OBS HIGH 50: CPT

## 2024-01-06 PROCEDURE — 71250 CT THORAX DX C-: CPT | Mod: 26

## 2024-01-06 RX ORDER — PANTOPRAZOLE SODIUM 20 MG/1
40 TABLET, DELAYED RELEASE ORAL
Refills: 0 | Status: DISCONTINUED | OUTPATIENT
Start: 2024-01-06 | End: 2024-01-09

## 2024-01-06 RX ADMIN — Medication 40 MILLIGRAM(S): at 05:39

## 2024-01-06 RX ADMIN — Medication 3 MILLILITER(S): at 12:31

## 2024-01-06 RX ADMIN — CHLORHEXIDINE GLUCONATE 1 APPLICATION(S): 213 SOLUTION TOPICAL at 05:51

## 2024-01-06 RX ADMIN — Medication 3 MILLILITER(S): at 17:47

## 2024-01-06 RX ADMIN — Medication 500 MILLIGRAM(S): at 12:31

## 2024-01-06 RX ADMIN — Medication 3 MILLILITER(S): at 05:32

## 2024-01-06 RX ADMIN — CARVEDILOL PHOSPHATE 3.12 MILLIGRAM(S): 80 CAPSULE, EXTENDED RELEASE ORAL at 05:31

## 2024-01-06 RX ADMIN — Medication 40 MILLIGRAM(S): at 21:06

## 2024-01-06 RX ADMIN — Medication 75 MICROGRAM(S): at 05:31

## 2024-01-06 RX ADMIN — CARVEDILOL PHOSPHATE 3.12 MILLIGRAM(S): 80 CAPSULE, EXTENDED RELEASE ORAL at 17:47

## 2024-01-06 RX ADMIN — Medication 600 MILLIGRAM(S): at 05:31

## 2024-01-06 RX ADMIN — Medication 40 MILLIGRAM(S): at 12:30

## 2024-01-06 RX ADMIN — RIVAROXABAN 20 MILLIGRAM(S): KIT at 17:48

## 2024-01-06 RX ADMIN — Medication 3 MILLILITER(S): at 00:17

## 2024-01-06 RX ADMIN — Medication 600 MILLIGRAM(S): at 17:48

## 2024-01-06 RX ADMIN — Medication 1 TABLET(S): at 12:31

## 2024-01-06 NOTE — PROGRESS NOTE ADULT - NSPROGADDITIONALINFOA_GEN_ALL_CORE
Discussed with ACP, Sherice Dinh.  Pt's son, Tre Martinez updated via phone on 1/7.
Discussed with ACP, Isamar Nickerson.  Pt's son, Tre Martinez updated via phone.

## 2024-01-06 NOTE — PROGRESS NOTE ADULT - PROBLEM SELECTOR PLAN 1
Hypoxic respiratory failure secondary to RSV infection  - Lower concern for bacterial infx: CXR clear and no leukocytosis  - Symptomatic management: standing DuoNebs and anti-tussives  - s/p prednisone 40mg on 1/5, with persistent hypoxia  - will change in solumedrol 40mg IV BID today  - obtain CT chest to r/o occult infection  - sputum culture  - will urinary legionella and strep pneumoniae ag   - Ambulatory SpO2 90% on room air Hypoxic respiratory failure secondary to RSV infection.  - Per son, the patient has no history of asthma, COPD or other primary pulmonary history. No smoking history  - Lower concern for bacterial infx: CXR clear and no leukocytosis  - Symptomatic management: standing DuoNebs and anti-tussives  - s/p prednisone 40mg on 1/5, with persistent hypoxia  - will change in solumedrol 40mg IV BID today  - obtain CT chest to r/o occult infection  - sputum culture  - will urinary legionella and strep pneumoniae ag   - Ambulatory SpO2 90% on room air

## 2024-01-06 NOTE — PROGRESS NOTE ADULT - SUBJECTIVE AND OBJECTIVE BOX
Lashay Dias MD  Hospitalist  Available on MS Teams      PROGRESS NOTE:     Patient is a 88y old  Female who presents with a chief complaint of Hypoxemia    Per chart, pt is an 88 year old female with PMHx of HTN, chronic atrial fibrillation (with PPM), hypothyroidism, and dementia (dependent on others for ADLs) presenting for one week history of cough and weakness in setting of RSV infection. (05 Jan 2024 14:40)      SUBJECTIVE / OVERNIGHT EVENTS:  This morning, she was titrated off of 2L of NC, SpO2 went down to 88%. Saturations improved with 2L of NC.    The patient this morning denies any overt shortness of breath. Continues to have cough, productive. No fevers/chills.     MEDICATIONS  (STANDING):  albuterol/ipratropium for Nebulization 3 milliLiter(s) Nebulizer every 6 hours  ascorbic acid 500 milliGRAM(s) Oral daily  carvedilol 3.125 milliGRAM(s) Oral every 12 hours  chlorhexidine 2% Cloths 1 Application(s) Topical <User Schedule>  guaiFENesin  milliGRAM(s) Oral every 12 hours  levothyroxine 75 MICROGram(s) Oral daily  methylPREDNISolone sodium succinate Injectable 40 milliGRAM(s) IV Push two times a day  multivitamin 1 Tablet(s) Oral daily  pantoprazole    Tablet 40 milliGRAM(s) Oral before breakfast  rivaroxaban 20 milliGRAM(s) Oral with dinner    MEDICATIONS  (PRN):  acetaminophen     Tablet .. 650 milliGRAM(s) Oral every 6 hours PRN Temp greater or equal to 38C (100.4F), Mild Pain (1 - 3), Moderate Pain (4 - 6), Severe Pain (7 - 10)      CAPILLARY BLOOD GLUCOSE        I&O's Summary    05 Jan 2024 07:01  -  06 Jan 2024 07:00  --------------------------------------------------------  IN: 0 mL / OUT: 300 mL / NET: -300 mL    06 Jan 2024 07:01  -  06 Jan 2024 16:41  --------------------------------------------------------  IN: 480 mL / OUT: 400 mL / NET: 80 mL        PHYSICAL EXAM:  Vital Signs Last 24 Hrs  T(C): 36.8 (06 Jan 2024 11:31), Max: 37.1 (05 Jan 2024 20:24)  T(F): 98.3 (06 Jan 2024 11:31), Max: 98.7 (05 Jan 2024 20:24)  HR: 77 (06 Jan 2024 11:31) (77 - 99)  BP: 138/80 (06 Jan 2024 11:31) (124/82 - 165/96)  BP(mean): --  RR: 18 (06 Jan 2024 11:31) (17 - 18)  SpO2: 93% (06 Jan 2024 11:31) (88% - 97%)    Parameters below as of 06 Jan 2024 11:31  Patient On (Oxygen Delivery Method): nasal cannula  O2 Flow (L/min): 2    CONSTITUTIONAL: Well-developed, well-groomed, in no apparent distress  EYES: No conjunctival or scleral injection  RESPIRATORY: Breathing comfortably on 2L of NC; +expiratory wheezing bialterally  CARDIOVASCULAR: +S1S2, RRR, no M/G/R; no lower extremity edema  GASTROINTESTINAL: soft, nontender, nondistended  NEUROLOGIC: CN II-XII intact; normal reflexes in upper and lower extremities; sensation intact in LEs b/l to light touch  PSYCHIATRIC: alert and oriented x3; mood and affect appropriate; appropriate insight and judgment    LABS:                        13.3   4.72  )-----------( 148      ( 05 Jan 2024 07:34 )             41.0     01-05    137  |  102  |  13  ----------------------------<  88  4.3   |  30  |  0.44<L>    Ca    9.8      05 Jan 2024 07:29            Urinalysis Basic - ( 05 Jan 2024 07:29 )    Color: x / Appearance: x / SG: x / pH: x  Gluc: 88 mg/dL / Ketone: x  / Bili: x / Urobili: x   Blood: x / Protein: x / Nitrite: x   Leuk Esterase: x / RBC: x / WBC x   Sq Epi: x / Non Sq Epi: x / Bacteria: x        Culture - Urine (collected 04 Jan 2024 14:54)  Source: Clean Catch Clean Catch (Midstream)  Final Report (05 Jan 2024 21:18):    No growth    Culture - Blood (collected 04 Jan 2024 11:53)  Source: .Blood Blood-Peripheral  Preliminary Report (05 Jan 2024 18:02):    No growth at 24 hours    Culture - Blood (collected 04 Jan 2024 11:33)  Source: .Blood Blood-Peripheral  Preliminary Report (05 Jan 2024 18:02):    No growth at 24 hours

## 2024-01-07 LAB
LEGIONELLA AG UR QL: NEGATIVE — SIGNIFICANT CHANGE UP
LEGIONELLA AG UR QL: NEGATIVE — SIGNIFICANT CHANGE UP
S PNEUM AG UR QL: NEGATIVE — SIGNIFICANT CHANGE UP
S PNEUM AG UR QL: NEGATIVE — SIGNIFICANT CHANGE UP

## 2024-01-07 PROCEDURE — 99233 SBSQ HOSP IP/OBS HIGH 50: CPT

## 2024-01-07 RX ADMIN — Medication 3 MILLILITER(S): at 16:54

## 2024-01-07 RX ADMIN — Medication 600 MILLIGRAM(S): at 05:07

## 2024-01-07 RX ADMIN — Medication 1 TABLET(S): at 11:23

## 2024-01-07 RX ADMIN — Medication 40 MILLIGRAM(S): at 05:07

## 2024-01-07 RX ADMIN — Medication 3 MILLILITER(S): at 00:48

## 2024-01-07 RX ADMIN — Medication 40 MILLIGRAM(S): at 16:54

## 2024-01-07 RX ADMIN — RIVAROXABAN 20 MILLIGRAM(S): KIT at 16:54

## 2024-01-07 RX ADMIN — PANTOPRAZOLE SODIUM 40 MILLIGRAM(S): 20 TABLET, DELAYED RELEASE ORAL at 05:07

## 2024-01-07 RX ADMIN — Medication 3 MILLILITER(S): at 11:22

## 2024-01-07 RX ADMIN — Medication 3 MILLILITER(S): at 05:08

## 2024-01-07 RX ADMIN — CHLORHEXIDINE GLUCONATE 1 APPLICATION(S): 213 SOLUTION TOPICAL at 05:08

## 2024-01-07 RX ADMIN — Medication 75 MICROGRAM(S): at 05:07

## 2024-01-07 RX ADMIN — Medication 600 MILLIGRAM(S): at 16:56

## 2024-01-07 RX ADMIN — CARVEDILOL PHOSPHATE 3.12 MILLIGRAM(S): 80 CAPSULE, EXTENDED RELEASE ORAL at 05:07

## 2024-01-07 RX ADMIN — Medication 500 MILLIGRAM(S): at 11:22

## 2024-01-07 RX ADMIN — CARVEDILOL PHOSPHATE 3.12 MILLIGRAM(S): 80 CAPSULE, EXTENDED RELEASE ORAL at 16:54

## 2024-01-07 NOTE — CONSULT NOTE ADULT - ASSESSMENT
88F with PMHx of HTN, chronic atrial fibrillation (with PPM), hypothyroidism, and dementia admitted for hypoxia 2/2 RSV.     #RSV infection  #hypoxemia    - wean NC as tolerated  - supportive care for RSV   - obtain TTE to eval for pHTN  - needs outpt f/up; pt family stated they will schedule appt w pulm themselves  - CT chest showing mosaic attenuation of lung parenchyma with pulmonary nodules in bilateral lungs; please obtain repeat CT chest in 6-8 weeks as outpt  - c/w AC  - can transition to PO pred 40mg x5d, or longer if not better at that point  - c/w duoneb, mucinex

## 2024-01-07 NOTE — CONSULT NOTE ADULT - SUBJECTIVE AND OBJECTIVE BOX
CHIEF COMPLAINT:    HPI:    PAST MEDICAL & SURGICAL HISTORY:  HTN (hypertension)      Afib      HLD (hyperlipidemia)      Hypothyroid      DM (diabetes mellitus)      No significant past surgical history          FAMILY HISTORY:  No pertinent family history in first degree relatives        SOCIAL HISTORY:  Smoking: [ ] Never Smoked [ ] Former Smoker (__ packs x ___ years) [ ] Current Smoker  (__ packs x ___ years)  Substance Use: [ ] Never Used [ ] Used ____  EtOH Use:  Occupation:  Recent Travel:  Country of Birth:  Advance Directives:    Allergies    penicillin (Hives)    Intolerances        HOME MEDICATIONS:    REVIEW OF SYSTEMS:  Constitutional: [x] negative [ ] fevers [ ] chills [ ] weight loss [ ] weight gain  HEENT: [x] negative [ ] dry eyes [ ] eye irritation [ ] postnasal drip [ ] nasal congestion  CV: [x] negative  [ ] chest pain [ ] orthopnea [ ] palpitations [ ] murmur  Resp: [x] negative [ ] cough [ ] shortness of breath [ ] dyspnea [ ] wheezing [ ] sputum [ ] hemoptysis  GI: [x] negative [ ] nausea [ ] vomiting [ ] diarrhea [ ] constipation [ ] abd pain [ ] dysphagia   : [x] negative [ ] dysuria [ ] nocturia [ ] hematuria [ ] increased urinary frequency  Musculoskeletal: [x] negative [ ] back pain [ ] myalgias [ ] arthralgias [ ] fracture  Skin: [x] negative [ ] rash [ ] itch  Neurological: [x] negative [ ] headache [ ] dizziness [ ] syncope [ ] weakness [ ] numbness  Psychiatric: [x] negative [ ] anxiety [ ] depression  Endocrine: [x] negative [ ] diabetes [ ] thyroid problem  Hematologic/Lymphatic: [x] negative [ ] anemia [ ] bleeding problem  Allergic/Immunologic: [x] negative [ ] itchy eyes [ ] nasal discharge [ ] hives [ ] angioedema  [x] All other systems negative  [ ] Unable to assess ROS because ________    OBJECTIVE:  ICU Vital Signs Last 24 Hrs  T(C): 36.6 (07 Jan 2024 11:53), Max: 36.7 (06 Jan 2024 20:20)  T(F): 97.9 (07 Jan 2024 11:53), Max: 98.1 (06 Jan 2024 20:20)  HR: 80 (07 Jan 2024 16:42) (72 - 97)  BP: 147/80 (07 Jan 2024 16:42) (145/74 - 179/100)  BP(mean): --  ABP: --  ABP(mean): --  RR: 18 (07 Jan 2024 16:42) (18 - 18)  SpO2: 92% (07 Jan 2024 16:42) (92% - 96%)    O2 Parameters below as of 07 Jan 2024 16:42  Patient On (Oxygen Delivery Method): room air              01-06 @ 07:01 - 01-07 @ 07:00  --------------------------------------------------------  IN: 480 mL / OUT: 1100 mL / NET: -620 mL    01-07 @ 07:01 - 01-07 @ 18:34  --------------------------------------------------------  IN: 480 mL / OUT: 450 mL / NET: 30 mL      CAPILLARY BLOOD GLUCOSE          PHYSICAL EXAM:  General:   HEENT: MMM, PERRLA, no oropharyngeal or perioral lesions  Neck: supple, no LAD  Respiratory:   Cardiovascular: RRR, no MRG  Abdomen: NTND  Extremities: no LE edema, warm  Neurological: AOx3, no gross deficits    HOSPITAL MEDICATIONS:  rivaroxaban 20 milliGRAM(s) Oral with dinner      carvedilol 3.125 milliGRAM(s) Oral every 12 hours    levothyroxine 75 MICROGram(s) Oral daily  methylPREDNISolone sodium succinate Injectable 40 milliGRAM(s) IV Push two times a day    albuterol/ipratropium for Nebulization 3 milliLiter(s) Nebulizer every 6 hours  guaiFENesin  milliGRAM(s) Oral every 12 hours    acetaminophen     Tablet .. 650 milliGRAM(s) Oral every 6 hours PRN    pantoprazole    Tablet 40 milliGRAM(s) Oral before breakfast        ascorbic acid 500 milliGRAM(s) Oral daily  multivitamin 1 Tablet(s) Oral daily      chlorhexidine 2% Cloths 1 Application(s) Topical <User Schedule>        LABS:    Hgb Trend: 13.3<--, 13.9<--        Creatinine Trend: 0.44<--, 0.45<--            MICROBIOLOGY:     RADIOLOGY:  [x] Reviewed and interpreted by me    PULMONARY FUNCTION TESTS:    EKG: CHIEF COMPLAINT: cough    HPI:  88F with PMHx of HTN, chronic atrial fibrillation (with PPM), hypothyroidism, and dementia (dependent on others for ADLs) presenting for one week history of cough and weakness in setting of RSV infection. Patient began to develop cough several days prior. Also ambulates with a walker, but has been more weak.  Presentedd when spo2 on RA dropped to 90%,     Since admission, has been on 2L NC.   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     PAST MEDICAL & SURGICAL HISTORY:  HTN (hypertension)      Afib      HLD (hyperlipidemia)      Hypothyroid      DM (diabetes mellitus)      No significant past surgical history          FAMILY HISTORY:  No pertinent family history in first degree relatives        SOCIAL HISTORY:  Smoking: [x ] Never Smoked [ ] Former Smoker (__ packs x ___ years) [ ] Current Smoker  (__ packs x ___ years)  Substance Use: [x ] Never Used [ ] Used ____  EtOH Use:x  Occupation:high school tracher  Recent Travel:  Country of Birth:  Advance Directives:    Allergies    penicillin (Hives)    Intolerances        HOME MEDICATIONS:    REVIEW OF SYSTEMS:  Constitutional: [x] negative [ ] fevers [ ] chills [ ] weight loss [ ] weight gain  HEENT: [x] negative [ ] dry eyes [ ] eye irritation [ ] postnasal drip [ ] nasal congestion  CV: [x] negative  [ ] chest pain [ ] orthopnea [ ] palpitations [ ] murmur  Resp: [x] negative [ ] cough [ ] shortness of breath [ ] dyspnea [ ] wheezing [ ] sputum [ ] hemoptysis  GI: [x] negative [ ] nausea [ ] vomiting [ ] diarrhea [ ] constipation [ ] abd pain [ ] dysphagia   : [x] negative [ ] dysuria [ ] nocturia [ ] hematuria [ ] increased urinary frequency  Musculoskeletal: [x] negative [ ] back pain [ ] myalgias [ ] arthralgias [ ] fracture  Skin: [x] negative [ ] rash [ ] itch  Neurological: [x] negative [ ] headache [ ] dizziness [ ] syncope [ ] weakness [ ] numbness  Psychiatric: [x] negative [ ] anxiety [ ] depression  Endocrine: [x] negative [ ] diabetes [ ] thyroid problem  Hematologic/Lymphatic: [x] negative [ ] anemia [ ] bleeding problem  Allergic/Immunologic: [x] negative [ ] itchy eyes [ ] nasal discharge [ ] hives [ ] angioedema  [x] All other systems negative  [ ] Unable to assess ROS because ________    OBJECTIVE:  ICU Vital Signs Last 24 Hrs  T(C): 36.6 (07 Jan 2024 11:53), Max: 36.7 (06 Jan 2024 20:20)  T(F): 97.9 (07 Jan 2024 11:53), Max: 98.1 (06 Jan 2024 20:20)  HR: 80 (07 Jan 2024 16:42) (72 - 97)  BP: 147/80 (07 Jan 2024 16:42) (145/74 - 179/100)  BP(mean): --  ABP: --  ABP(mean): --  RR: 18 (07 Jan 2024 16:42) (18 - 18)  SpO2: 92% (07 Jan 2024 16:42) (92% - 96%)    O2 Parameters below as of 07 Jan 2024 16:42  Patient On (Oxygen Delivery Method): room air              01-06 @ 07:01 - 01-07 @ 07:00  --------------------------------------------------------  IN: 480 mL / OUT: 1100 mL / NET: -620 mL    01-07 @ 07:01 - 01-07 @ 18:34  --------------------------------------------------------  IN: 480 mL / OUT: 450 mL / NET: 30 mL      CAPILLARY BLOOD GLUCOSE          PHYSICAL EXAM:  General: nad  HEENT: MMM, PERRLA, no oropharyngeal or perioral lesions  Neck: supple, no LAD  Respiratory: comf on 2L NC, mild-mod rhonchi bilaterally  Cardiovascular: RRR, no MRG  Abdomen: NTND  Extremities: no LE edema, warm  Neurological: AOx3, no gross deficits    HOSPITAL MEDICATIONS:  rivaroxaban 20 milliGRAM(s) Oral with dinner      carvedilol 3.125 milliGRAM(s) Oral every 12 hours    levothyroxine 75 MICROGram(s) Oral daily  methylPREDNISolone sodium succinate Injectable 40 milliGRAM(s) IV Push two times a day    albuterol/ipratropium for Nebulization 3 milliLiter(s) Nebulizer every 6 hours  guaiFENesin  milliGRAM(s) Oral every 12 hours    acetaminophen     Tablet .. 650 milliGRAM(s) Oral every 6 hours PRN    pantoprazole    Tablet 40 milliGRAM(s) Oral before breakfast        ascorbic acid 500 milliGRAM(s) Oral daily  multivitamin 1 Tablet(s) Oral daily      chlorhexidine 2% Cloths 1 Application(s) Topical <User Schedule>        LABS:    Hgb Trend: 13.3<--, 13.9<--        Creatinine Trend: 0.44<--, 0.45<--            MICROBIOLOGY:     RADIOLOGY:  [x] Reviewed and interpreted by me    PULMONARY FUNCTION TESTS:    EKG:

## 2024-01-07 NOTE — PROGRESS NOTE ADULT - PROBLEM SELECTOR PLAN 1
Hypoxic respiratory failure secondary to RSV infection.  - Per son, the patient has no history of asthma, COPD or other primary pulmonary history. No smoking history  - Lower concern for bacterial infx: CXR clear and no leukocytosis  - Symptomatic management: standing DuoNebs and anti-tussives  - s/p prednisone 40mg on 1/5, with persistent hypoxia  - will change in solumedrol 40mg IV BID day 2, will de-escalate based on pulm recs   - sputum culture  - will urinary legionella and strep pneumoniae ag   - Ambulatory SpO2 90% on room air  - CT chest: Mosaic attenuation of the lung parenchyma with associated pulmonary nodules in bilateral lungs. Diffuse idiopathic pulmonary neuroendocrine cell hyperplasia is a consideration. Correlation with patient history and short-term follow-up CT is recommended to establish stability. Mildly dilated main pulmonary artery, suggestive of pulmonary hypertension  - Pulm consulted for further recs

## 2024-01-07 NOTE — CONSULT NOTE ADULT - ATTENDING COMMENTS
Agree with above  89F PMH hypertension, AF (s/p PPM), dementia p/w one week of cough and weakness. Found to be RSV+ and hypoxemic on room air.  - Lifelong non-smoker, no secondhand smoke exposure.   - Lives in house with son and DIL. No known mold exposure. 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 No known occupational exposures  - CT shows areas of GGO in mosaic pattern, possible areas of air trapping vs CTEPH (less likely given on AC for AF)  - DuoNEBs and steroids, can transition to 40mg PO daily for 5 days  - Repeat CT chest in 6–8 weeks as O/P after D/C  - Titrate FiO2 as tolerated to maintain spO2 =92%. Evaluate if patient needs oxygen at rest and ambulation.  - Echo to evaluate LV/RV  - c/w DOAC for AF  - Per family, will get pulmonologist for O/P F/U  - Please feel free to call with any questions. Agree with above  89F PMH hypertension, AF (s/p PPM), dementia p/w one week of cough and weakness. Found to be RSV+ and hypoxemic on room air.  - Lifelong non-smoker, no secondhand smoke exposure.   - Lives in house with son and DIL. No known mold exposure. 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 No known occupational exposures  - CT shows areas of GGO in mosaic pattern, possible areas of air trapping vs CTEPH (less likely given on AC for AF)  - DuoNEBs and steroids, can transition to 40mg PO daily for 5 days  - Repeat CT chest in 6–8 weeks as O/P after D/C  - Titrate FiO2 as tolerated to maintain spO2 =92%. Evaluate if patient needs oxygen at rest and ambulation.  - Echo to evaluate LV/RV  - c/w DOAC for AF  - Per family, will get pulmonologist for O/P F/U  - Please feel free to call with any questions.

## 2024-01-07 NOTE — PROGRESS NOTE ADULT - SUBJECTIVE AND OBJECTIVE BOX
[facility] Division of Hospital Medicine  Seth Green MD  Available via MS Teams  Pager: [phone]    SUBJECTIVE / OVERNIGHT EVENTS: Patient seen and examined at bedside. No acute overnight events. No new events.     ADDITIONAL REVIEW OF SYSTEMS: n/a    MEDICATIONS  (STANDING):  albuterol/ipratropium for Nebulization 3 milliLiter(s) Nebulizer every 6 hours  ascorbic acid 500 milliGRAM(s) Oral daily  carvedilol 3.125 milliGRAM(s) Oral every 12 hours  chlorhexidine 2% Cloths 1 Application(s) Topical <User Schedule>  guaiFENesin  milliGRAM(s) Oral every 12 hours  levothyroxine 75 MICROGram(s) Oral daily  methylPREDNISolone sodium succinate Injectable 40 milliGRAM(s) IV Push two times a day  multivitamin 1 Tablet(s) Oral daily  pantoprazole    Tablet 40 milliGRAM(s) Oral before breakfast  rivaroxaban 20 milliGRAM(s) Oral with dinner    MEDICATIONS  (PRN):  acetaminophen     Tablet .. 650 milliGRAM(s) Oral every 6 hours PRN Temp greater or equal to 38C (100.4F), Mild Pain (1 - 3), Moderate Pain (4 - 6), Severe Pain (7 - 10)      I&O's Summary    06 Jan 2024 07:01  -  07 Jan 2024 07:00  --------------------------------------------------------  IN: 480 mL / OUT: 1100 mL / NET: -620 mL    07 Jan 2024 07:01  -  07 Jan 2024 14:16  --------------------------------------------------------  IN: 480 mL / OUT: 0 mL / NET: 480 mL        PHYSICAL EXAM:  Vital Signs Last 24 Hrs  T(C): 36.6 (07 Jan 2024 11:53), Max: 36.7 (06 Jan 2024 20:20)  T(F): 97.9 (07 Jan 2024 11:53), Max: 98.1 (06 Jan 2024 20:20)  HR: 75 (07 Jan 2024 11:53) (72 - 97)  BP: 145/74 (07 Jan 2024 11:53) (145/74 - 179/100)  BP(mean): --  RR: 18 (07 Jan 2024 11:53) (18 - 18)  SpO2: 95% (07 Jan 2024 11:53) (94% - 96%)    Parameters below as of 07 Jan 2024 11:53  Patient On (Oxygen Delivery Method): nasal cannula  O2 Flow (L/min): 2    CONSTITUTIONAL: Well-developed, well-groomed, in no apparent distress  EYES: No conjunctival or scleral injection  RESPIRATORY: Breathing comfortably; +expiratory wheezing bialterally  CARDIOVASCULAR: +S1S2, RRR, no M/G/R; no lower extremity edema  GASTROINTESTINAL: soft, nontender, nondistended  NEUROLOGIC: CN II-XII intact; normal reflexes in upper and lower extremities; sensation intact in LEs b/l to light touch  PSYCHIATRIC: alert and oriented; mood and affect appropriate; appropriate insight and judgment    LABS:      Culture - Urine (collected 04 Jan 2024 14:54)  Source: Clean Catch Clean Catch (Midstream)  Final Report (05 Jan 2024 21:18):    No growth          RADIOLOGY & ADDITIONAL TESTS:  New Results Reviewed Today:   < from: CT Chest No Cont (01.06.24 @ 21:13) >      IMPRESSION:  Mosaic attenuation of the lung parenchyma with associated pulmonary   nodules in bilateral lungs. Diffuse idiopathic pulmonary neuroendocrine   cell hyperplasia is a consideration. Correlation with patient history and   short-term follow-up CT is recommended to establish stability  Mildly dilated main pulmonary artery, suggestive of pulmonary hypertension  < end of copied text >    New Imaging Personally Reviewed Today:  New Electrocardiogram Personally Reviewed Today:  Prior or Outpatient Records Reviewed Today:    COMMUNICATION:  Care Discussed with Consultants/Other Providers and Details of Discussion:  Discussions with Patient/Family:  PCP Communication:     [facility] Division of Hospital Medicine  Seth Green MD  Available via MS Teams  Pager: [phone]    SUBJECTIVE / OVERNIGHT EVENTS: Patient seen and examined at bedside. No acute overnight events. No new events.     ADDITIONAL REVIEW OF SYSTEMS: n/a    MEDICATIONS  (STANDING):  albuterol/ipratropium for Nebulization 3 milliLiter(s) Nebulizer every 6 hours  ascorbic acid 500 milliGRAM(s) Oral daily  carvedilol 3.125 milliGRAM(s) Oral every 12 hours  chlorhexidine 2% Cloths 1 Application(s) Topical <User Schedule>  guaiFENesin  milliGRAM(s) Oral every 12 hours  levothyroxine 75 MICROGram(s) Oral daily  methylPREDNISolone sodium succinate Injectable 40 milliGRAM(s) IV Push two times a day  multivitamin 1 Tablet(s) Oral daily  pantoprazole    Tablet 40 milliGRAM(s) Oral before breakfast  rivaroxaban 20 milliGRAM(s) Oral with dinner    MEDICATIONS  (PRN):  acetaminophen     Tablet .. 650 milliGRAM(s) Oral every 6 hours PRN Temp greater or equal to 38C (100.4F), Mild Pain (1 - 3), Moderate Pain (4 - 6), Severe Pain (7 - 10)      I&O's Summary    06 Jan 2024 07:01  -  07 Jan 2024 07:00  --------------------------------------------------------  IN: 480 mL / OUT: 1100 mL / NET: -620 mL    07 Jan 2024 07:01  -  07 Jan 2024 14:16  --------------------------------------------------------  IN: 480 mL / OUT: 0 mL / NET: 480 mL        PHYSICAL EXAM:  Vital Signs Last 24 Hrs  T(C): 36.6 (07 Jan 2024 11:53), Max: 36.7 (06 Jan 2024 20:20)  T(F): 97.9 (07 Jan 2024 11:53), Max: 98.1 (06 Jan 2024 20:20)  HR: 75 (07 Jan 2024 11:53) (72 - 97)  BP: 145/74 (07 Jan 2024 11:53) (145/74 - 179/100)  BP(mean): --  RR: 18 (07 Jan 2024 11:53) (18 - 18)  SpO2: 95% (07 Jan 2024 11:53) (94% - 96%)    Parameters below as of 07 Jan 2024 11:53  Patient On (Oxygen Delivery Method): nasal cannula  O2 Flow (L/min): 2    CONSTITUTIONAL: Well-developed, well-groomed, in no apparent distress  EYES: No conjunctival or scleral injection  RESPIRATORY: Breathing comfortably; +expiratory wheezing bialterally  CARDIOVASCULAR: +S1S2, RRR, no M/G/R; no lower extremity edema  GASTROINTESTINAL: soft, nontender, nondistended  NEUROLOGIC: CN II-XII intact; normal reflexes in upper and lower extremities; sensation intact in LEs b/l to light touch  PSYCHIATRIC: alert and oriented; mood and affect appropriate; appropriate insight and judgment    LABS:      Culture - Urine (collected 04 Jan 2024 14:54)  Source: Clean Catch Clean Catch (Midstream)  Final Report (05 Jan 2024 21:18):    No growth          RADIOLOGY & ADDITIONAL TESTS:  New Results Reviewed Today:   < from: CT Chest No Cont (01.06.24 @ 21:13) >      IMPRESSION:  Mosaic attenuation of the lung parenchyma with associated pulmonary   nodules in bilateral lungs. Diffuse idiopathic pulmonary neuroendocrine   cell hyperplasia is a consideration. Correlation with patient history and   short-term follow-up CT is recommended to establish stability  Mildly dilated main pulmonary artery, suggestive of pulmonary hypertension  < end of copied text >    New Imaging Personally Reviewed Today:  New Electrocardiogram Personally Reviewed Today:  Prior or Outpatient Records Reviewed Today:    COMMUNICATION:  Care Discussed with Consultants/Other Providers and Details of Discussion:  Discussions with Patient/Family:  PCP Communication:

## 2024-01-07 NOTE — CONSULT NOTE ADULT - TIME BILLING
Reviewed images and labs. Clinical decision making, changes in medication regimen. Discussion with family and primary team.

## 2024-01-08 ENCOUNTER — TRANSCRIPTION ENCOUNTER (OUTPATIENT)
Age: 89
End: 2024-01-08

## 2024-01-08 PROCEDURE — 93306 TTE W/DOPPLER COMPLETE: CPT | Mod: 26

## 2024-01-08 PROCEDURE — 99232 SBSQ HOSP IP/OBS MODERATE 35: CPT

## 2024-01-08 RX ORDER — PANTOPRAZOLE SODIUM 20 MG/1
1 TABLET, DELAYED RELEASE ORAL
Qty: 30 | Refills: 0
Start: 2024-01-08 | End: 2024-02-06

## 2024-01-08 RX ADMIN — Medication 75 MICROGRAM(S): at 05:33

## 2024-01-08 RX ADMIN — Medication 3 MILLILITER(S): at 22:11

## 2024-01-08 RX ADMIN — Medication 600 MILLIGRAM(S): at 05:29

## 2024-01-08 RX ADMIN — Medication 3 MILLILITER(S): at 17:24

## 2024-01-08 RX ADMIN — Medication 3 MILLILITER(S): at 11:18

## 2024-01-08 RX ADMIN — Medication 3 MILLILITER(S): at 00:15

## 2024-01-08 RX ADMIN — RIVAROXABAN 20 MILLIGRAM(S): KIT at 17:24

## 2024-01-08 RX ADMIN — CARVEDILOL PHOSPHATE 3.12 MILLIGRAM(S): 80 CAPSULE, EXTENDED RELEASE ORAL at 17:25

## 2024-01-08 RX ADMIN — Medication 40 MILLIGRAM(S): at 05:30

## 2024-01-08 RX ADMIN — Medication 600 MILLIGRAM(S): at 17:25

## 2024-01-08 RX ADMIN — Medication 1 TABLET(S): at 11:18

## 2024-01-08 RX ADMIN — CHLORHEXIDINE GLUCONATE 1 APPLICATION(S): 213 SOLUTION TOPICAL at 05:30

## 2024-01-08 RX ADMIN — PANTOPRAZOLE SODIUM 40 MILLIGRAM(S): 20 TABLET, DELAYED RELEASE ORAL at 05:28

## 2024-01-08 RX ADMIN — CARVEDILOL PHOSPHATE 3.12 MILLIGRAM(S): 80 CAPSULE, EXTENDED RELEASE ORAL at 05:29

## 2024-01-08 RX ADMIN — Medication 500 MILLIGRAM(S): at 11:18

## 2024-01-08 RX ADMIN — Medication 3 MILLILITER(S): at 05:29

## 2024-01-08 NOTE — PROGRESS NOTE ADULT - PROBLEM SELECTOR PLAN 6
- DVT ppx: home xarelto for hx of a fib  - GI ppx: none  - Diet: regular    -Dispo pending TTE and home aides reinstated

## 2024-01-08 NOTE — DISCHARGE NOTE PROVIDER - NSDCFUADDAPPT_GEN_ALL_CORE_FT
APPTS ARE READY TO BE MADE: [ ] YES    Best Family or Patient Contact (if needed):    Additional Information about above appointments (if needed):    1: Follow up with PCP Dr. Jack as outpatient   2: Follow up with pulmonary as outpatient       Other comments or requests:    APPTS ARE READY TO BE MADE: [x] YES    Best Family or Patient Contact (if needed):    Additional Information about above appointments (if needed):    1: Follow up with PCP Dr. Jack as outpatient   2: Follow up with pulmonary as outpatient       Other comments or requests:

## 2024-01-08 NOTE — DISCHARGE NOTE PROVIDER - NSRESEARCHGRANT_OVERRIDEREC_GEN_A_CORE
Patient called back  RN conveyed pap results and Dr. Mayra Forrester recommendation  Patient verbalized understanding   Colposcopy scheduled 8/17/2020 IMPROVE-DD Application Not Available

## 2024-01-08 NOTE — PROGRESS NOTE ADULT - TIME BILLING
reviewing emr, labs, imaging, coordination of care, discussion with patient, son, documentation
chart reviewing, history taking, physical exam, assessment and documentation, including speaking to specialist/SW/CM regarding the management.

## 2024-01-08 NOTE — DISCHARGE NOTE PROVIDER - NSDCFUSCHEDAPPT_GEN_ALL_CORE_FT
Ching Keenan  [facility] Physician [facility]  UROLOGY [address] [facility]  Scheduled Appointment: 01/11/2024     Ching Keenan  [facility] Physician [facility]  UROLOGY [address] [facility]  Scheduled Appointment: 01/11/2024

## 2024-01-08 NOTE — PROGRESS NOTE ADULT - PROBLEM SELECTOR PLAN 4
- Continue with Levothyroxine 75 mcg daily

## 2024-01-08 NOTE — DISCHARGE NOTE PROVIDER - HOSPITAL COURSE
HPI:  88F with PMHx of HTN, chronic atrial fibrillation (with PPM), hypothyroidism, and dementia (dependent on others for ADLs) presenting for one week history of cough and weakness in setting of RSV infection. Patient began to develop cough several days prior. Also ambulates with a walker, but has been more weak. They called their PMD Renita Jack who performed outpatient viral testing and patient was RSV positive. Initially at home was trying Mucinex and keeping in touch with PMD. They noted O2 was initially 96%, but became concerned when it dropped down to 90%. They called their PMD who referred here. In the ED was given DuoNeb, 500 cc NS, and Tylenol. History from patient limited, but she complains of cough. Son at bedside providing collateral.    Hospital Course:  Pt presented with worsening cough, congestion and hypoxia x5 days. Found in hypoxic respiratory failure secondary to RSV infection. Originally requiring 2L NC, now on room air. Per son, the patient has no history of asthma, COPD or other primary pulmonary history. CXR taken and clear, no leukocytosis seen on labs. Started on supportive care- duonebs and antitussivs. Evaluated by pulmonary. Ordered for IV solumedrol, recommended to transition to PO prednisone for 5 day course. Sputum culture collected and pending in lab. CT chest taken showing "Mosaic attenuation of the lung parenchyma with associated pulmonary nodules in bilateral lungs. Diffuse idiopathic pulmonary neuroendocrine cell hyperplasia is a consideration.  Mildly dilated main pulmonary artery, suggestive of pulmonary hypertension". Recommended for repeat CT chest in 6-8 weeks as outpatient per pulm. Pulm recommended TTE while inpatient to eval for pulm htn, TTE with ____________________________. Pt to remain on xarelto and coreg as prescribed for hx of afib.   - Pulm consulted for further recs. Pt has hx of dementia, Fully dependent on others for ADLs, but ambulates with RW, evaluated by PT and recommended for dispo home with Home PT. Pt to follow up with PCP as outpatient.     Important Medication Changes and Reason:  - Started on prednisone for 5 day course    Active or Pending Issues Requiring Follow-up:  - Follow up with PCP outpatient  -Follow up with pulmonary outpatient     Advanced Directives:   [X] Full code  [ ] DNR  [ ] Hospice    Discharge Diagnoses:  - Hypoxemia  -RSV  -Pulm nodules  -Pulmonary hypertension        Discharge/Dispo/Med rec discussed with attending  ____. Patient medically cleared for discharge Home w/ Home PT and HHA with outpatient follow up with PCP and pulmonary            HPI:  88F with PMHx of HTN, chronic atrial fibrillation (with PPM), hypothyroidism, and dementia (dependent on others for ADLs) presenting for one week history of cough and weakness in setting of RSV infection. Patient began to develop cough several days prior. Also ambulates with a walker, but has been more weak. They called their PMD Renita Jack who performed outpatient viral testing and patient was RSV positive. Initially at home was trying Mucinex and keeping in touch with PMD. They noted O2 was initially 96%, but became concerned when it dropped down to 90%. They called their PMD who referred here. In the ED was given DuoNeb, 500 cc NS, and Tylenol. History from patient limited, but she complains of cough. Son at bedside providing collateral.    Hospital Course:  Pt presented with worsening cough, congestion and hypoxia x5 days. Found in hypoxic respiratory failure secondary to RSV infection. Originally requiring 2L NC, now on room air. Per son, the patient has no history of asthma, COPD or other primary pulmonary history. CXR taken and clear, no leukocytosis seen on labs. Started on supportive care- duonebs and antitussivs. Evaluated by pulmonary. Ordered for IV solumedrol, recommended to transition to PO prednisone for 5 day course. Sputum culture collected and pending in lab. CT chest taken showing "Mosaic attenuation of the lung parenchyma with associated pulmonary nodules in bilateral lungs. Diffuse idiopathic pulmonary neuroendocrine cell hyperplasia is a consideration.  Mildly dilated main pulmonary artery, suggestive of pulmonary hypertension". Recommended for repeat CT chest in 6-8 weeks as outpatient per pulm. Pulm recommended TTE while inpatient to eval for pulm htn, TTE with "Estimated pulmonary artery systolic pressure is 71 mmHg, consistent with severe pulmonary hypertension".   Pt has hx of dementia, Fully dependent on others for ADLs, but ambulates with RW, evaluated by PT and recommended for dispo home with Home PT. Pt to follow up with PCP as outpatient.     Important Medication Changes and Reason:  - Started on prednisone for 5 day course    Active or Pending Issues Requiring Follow-up:  - Follow up with PCP outpatient  - Close Follow up with pulmonary outpatient     Advanced Directives:   [X] Full code  [ ] DNR  [ ] Hospice    Discharge Diagnoses:  - Hypoxemia  -RSV  -Pulm nodules  -Pulmonary hypertension        Discharge/Dispo/Med rec discussed with attending  ____. Patient medically cleared for discharge Home w/ Home PT and HHA with outpatient follow up with PCP and pulmonary            HPI:  88F with PMHx of HTN, chronic atrial fibrillation (with PPM), hypothyroidism, and dementia (dependent on others for ADLs) presenting for one week history of cough and weakness in setting of RSV infection. Patient began to develop cough several days prior. Also ambulates with a walker, but has been more weak. They called their PMD Renita Jack who performed outpatient viral testing and patient was RSV positive. Initially at home was trying Mucinex and keeping in touch with PMD. They noted O2 was initially 96%, but became concerned when it dropped down to 90%. They called their PMD who referred here. In the ED was given DuoNeb, 500 cc NS, and Tylenol. History from patient limited, but she complains of cough. Son at bedside providing collateral.    Hospital Course:  Pt presented with worsening cough, congestion and hypoxia x5 days. Found in hypoxic respiratory failure secondary to RSV infection. Originally requiring 2L NC, now on room air. Per son, the patient has no history of asthma, COPD or other primary pulmonary history. CXR taken and clear, no leukocytosis seen on labs. Started on supportive care- duonebs and antitussivs. Evaluated by pulmonary. Ordered for IV solumedrol, recommended to transition to PO prednisone for 5 day course. Sputum culture collected and pending in lab. CT chest taken showing "Mosaic attenuation of the lung parenchyma with associated pulmonary nodules in bilateral lungs. Diffuse idiopathic pulmonary neuroendocrine cell hyperplasia is a consideration.  Mildly dilated main pulmonary artery, suggestive of pulmonary hypertension". Recommended for repeat CT chest in 6-8 weeks as outpatient per pulm. Pulm recommended TTE while inpatient to eval for pulm htn, TTE with "Estimated pulmonary artery systolic pressure is 71 mmHg, consistent with severe pulmonary hypertension".   Pt has hx of dementia, Fully dependent on others for ADLs, but ambulates with RW, evaluated by PT and recommended for dispo home with Home PT. Pt to follow up with PCP as outpatient.     Important Medication Changes and Reason:  - Started on prednisone for 5 day course    Active or Pending Issues Requiring Follow-up:  - Follow up with PCP outpatient  - Close Follow up with pulmonary outpatient     Advanced Directives:   [X] Full code  [ ] DNR  [ ] Hospice    Discharge Diagnoses:  - Hypoxemia  -RSV  -Pulm nodules  -Pulmonary hypertension             HPI:  88F with PMHx of HTN, chronic atrial fibrillation (with PPM), hypothyroidism, and dementia (dependent on others for ADLs) presenting for one week history of cough and weakness in setting of RSV infection. Patient began to develop cough several days prior. Also ambulates with a walker, but has been more weak. They called their PMD Renita Jack who performed outpatient viral testing and patient was RSV positive. Initially at home was trying Mucinex and keeping in touch with PMD. They noted O2 was initially 96%, but became concerned when it dropped down to 90%. They called their PMD who referred here. In the ED was given DuoNeb, 500 cc NS, and Tylenol. History from patient limited, but she complains of cough. Son at bedside providing collateral.    Hospital Course:  Pt presented with worsening cough, congestion and hypoxia x5 days. Found in hypoxic respiratory failure secondary to RSV infection. Originally requiring 2L NC, now on room air. Per son, the patient has no history of asthma, COPD or other primary pulmonary history. CXR taken and clear, no leukocytosis seen on labs. Started on supportive care- duonebs and antitussivs. Evaluated by pulmonary. Ordered for IV solumedrol, recommended to transition to PO prednisone for 5 day course. Sputum culture collected and pending in lab. CT chest taken showing "Mosaic attenuation of the lung parenchyma with associated pulmonary nodules in bilateral lungs. Diffuse idiopathic pulmonary neuroendocrine cell hyperplasia is a consideration.  Mildly dilated main pulmonary artery, suggestive of pulmonary hypertension". Recommended for repeat CT chest in 6-8 weeks as outpatient per pulm. Pulm recommended TTE while inpatient to eval for pulm htn, TTE with "Estimated pulmonary artery systolic pressure is 71 mmHg, consistent with severe pulmonary hypertension". After further discussion with son, decision made to pursue RHC outpatient.   Pt has hx of dementia, Fully dependent on others for ADLs, but ambulates with RW, evaluated by PT and recommended for dispo home with Home PT. Pt to follow up with PCP as outpatient.     Important Medication Changes and Reason:  - Started on prednisone for 5 day course    Active or Pending Issues Requiring Follow-up:  - Follow up with PCP outpatient  - Close Follow up with pulmonary outpatient     Advanced Directives:   [X] Full code  [ ] DNR  [ ] Hospice    Discharge Diagnoses:  -Acute hypoxemic respiratory failure  -RSV  -Pulm nodules  -severe Pulmonary hypertension

## 2024-01-08 NOTE — PROGRESS NOTE ADULT - PROBLEM SELECTOR PLAN 3
- Continue with Coreg  - Holding home Aldactone given soft BP

## 2024-01-08 NOTE — DISCHARGE NOTE PROVIDER - ATTENDING DISCHARGE PHYSICAL EXAMINATION:
presented with hypoxemia iso RSV infection. Improved with steroids. W/u notable for CT chest with mosaic pattern and nodules. Also found to have pulm HTN on TTE (PASP 71). weaned to RA. After risk benefit discussion with son, plan for d/c on steroids to complete outpatient with pulm f/u for eval/RHC if needed  well appearing on RA  ctab/l, no wheezing/rhonchi  +bs soft  no LE edema, WWP  conversant, linear

## 2024-01-08 NOTE — DISCHARGE NOTE NURSING/CASE MANAGEMENT/SOCIAL WORK - NSSCCONTNUM_GEN_ALL_CORE
Conjuntivae and eyelids appear normal , Sclerae : White without injection, no icterus. [phone]

## 2024-01-08 NOTE — DISCHARGE NOTE PROVIDER - CARE PROVIDERS DIRECT ADDRESSES
How Severe Is Your Skin Lesion?: mild Has Your Skin Lesion Been Treated?: not been treated Is This A New Presentation, Or A Follow-Up?: Skin Lesion ,[email or web address] [place] [email or web address]

## 2024-01-08 NOTE — DISCHARGE NOTE PROVIDER - NSFOLLOWUPCLINICS_GEN_ALL_ED_FT
[facility] Pulmonolgy and Sleep Medicine  Pulmonology  [address] [place] [address], [place] [address]  Phone: [phone]  Fax:      [facility] Pulmonolgy and Sleep Medicine  Pulmonology  [address] [place] [address], [place] [address]  Phone: [phone]  Fax:

## 2024-01-08 NOTE — DISCHARGE NOTE NURSING/CASE MANAGEMENT/SOCIAL WORK - NSDCFUADDAPPT_GEN_ALL_CORE_FT
APPTS ARE READY TO BE MADE: [ ] YES    Best Family or Patient Contact (if needed):    Additional Information about above appointments (if needed):    1: Follow up with PCP Dr. Jack as outpatient   2: Follow up with pulmonary as outpatient       Other comments or requests:

## 2024-01-08 NOTE — PROGRESS NOTE ADULT - ASSESSMENT
88 year old female with PMHx of HTN, chronic atrial fibrillation (with PPM), hypothyroidism, and dementia (dependent on others for ADLs) presenting for one week history of cough and weakness in setting of RSV infection.

## 2024-01-08 NOTE — PROGRESS NOTE ADULT - PROBLEM SELECTOR PLAN 2
- Continue with Xarelto  - Continue with Coreg 3.125mg BID

## 2024-01-08 NOTE — DISCHARGE NOTE NURSING/CASE MANAGEMENT/SOCIAL WORK - PATIENT PORTAL LINK FT
You can access the FollowMyHealth Patient Portal offered by [facility] by registering at the following website: [email or web address]. By joining [employer]’s FollowMyHealth portal, you will also be able to view your health information using other applications (apps) compatible with our system. You can access the FollowMyHealth Patient Portal offered by [facility] by registering at the following website: [email or web address]. By joining [employer]’s FollowMyHealth portal, you will also be able to view your health information using other applications (apps) compatible with our system.

## 2024-01-08 NOTE — PROGRESS NOTE ADULT - SUBJECTIVE AND OBJECTIVE BOX
[facility] Division of Hospital Medicine  Pineda Oakley  MS Teams      SUBJECTIVE / OVERNIGHT EVENTS:  No events overnight  on RA speaking in full sentences  denies cp/sob    ADDITIONAL REVIEW OF SYSTEMS:    MEDICATIONS  (STANDING):  albuterol/ipratropium for Nebulization 3 milliLiter(s) Nebulizer every 6 hours  ascorbic acid 500 milliGRAM(s) Oral daily  carvedilol 3.125 milliGRAM(s) Oral every 12 hours  chlorhexidine 2% Cloths 1 Application(s) Topical <User Schedule>  guaiFENesin  milliGRAM(s) Oral every 12 hours  levothyroxine 75 MICROGram(s) Oral daily  multivitamin 1 Tablet(s) Oral daily  pantoprazole    Tablet 40 milliGRAM(s) Oral before breakfast  rivaroxaban 20 milliGRAM(s) Oral with dinner    MEDICATIONS  (PRN):  acetaminophen     Tablet .. 650 milliGRAM(s) Oral every 6 hours PRN Temp greater or equal to 38C (100.4F), Mild Pain (1 - 3), Moderate Pain (4 - 6), Severe Pain (7 - 10)      I&O's Summary    07 Jan 2024 07:01  -  08 Jan 2024 07:00  --------------------------------------------------------  IN: 480 mL / OUT: 800 mL / NET: -320 mL    08 Jan 2024 07:01  -  08 Jan 2024 15:28  --------------------------------------------------------  IN: 480 mL / OUT: 300 mL / NET: 180 mL        PHYSICAL EXAM:  Vital Signs Last 24 Hrs  T(C): 36.8 (08 Jan 2024 11:42), Max: 36.8 (08 Jan 2024 11:42)  T(F): 98.2 (08 Jan 2024 11:42), Max: 98.2 (08 Jan 2024 11:42)  HR: 90 (08 Jan 2024 11:42) (80 - 93)  BP: 134/86 (08 Jan 2024 11:42) (122/79 - 153/90)  BP(mean): --  RR: 18 (08 Jan 2024 11:42) (18 - 18)  SpO2: 97% (08 Jan 2024 11:42) (92% - 97%)    Parameters below as of 08 Jan 2024 11:42  Patient On (Oxygen Delivery Method): room air      CONSTITUTIONAL: NAD, well-developed  EYES: PERRLA; conjunctiva and sclera clear  RESPIRATORY: Normal respiratory effort; lungs are clear to auscultation bilaterally  CARDIOVASCULAR: Regular rate and rhythm, normal S1 and S2, no murmur; No lower extremity edema; Peripheral pulses are 2+ bilaterally  ABDOMEN: Nontender to palpation, normoactive bowel sounds  MUSCULOSKELETAL:  no clubbing or cyanosis of digits; no joint swelling or tenderness to palpation  PSYCH: A+O to person,; affect appropriate  NEUROLOGY: CN 2-12 are intact and symmetric; no gross sensory deficits   SKIN: No rashes; no palpable lesions   [facility] Division of Hospital Medicine  Pineda Oakley  MS Teams      SUBJECTIVE / OVERNIGHT EVENTS:  No events overnight  on RA speaking in full sentences  denies cp/sob    ADDITIONAL REVIEW OF SYSTEMS:    MEDICATIONS  (STANDING):  albuterol/ipratropium for Nebulization 3 milliLiter(s) Nebulizer every 6 hours  ascorbic acid 500 milliGRAM(s) Oral daily  carvedilol 3.125 milliGRAM(s) Oral every 12 hours  chlorhexidine 2% Cloths 1 Application(s) Topical <User Schedule>  guaiFENesin  milliGRAM(s) Oral every 12 hours  levothyroxine 75 MICROGram(s) Oral daily  multivitamin 1 Tablet(s) Oral daily  pantoprazole    Tablet 40 milliGRAM(s) Oral before breakfast  rivaroxaban 20 milliGRAM(s) Oral with dinner    MEDICATIONS  (PRN):  acetaminophen     Tablet .. 650 milliGRAM(s) Oral every 6 hours PRN Temp greater or equal to 38C (100.4F), Mild Pain (1 - 3), Moderate Pain (4 - 6), Severe Pain (7 - 10)      I&O's Summary    07 Jan 2024 07:01  -  08 Jan 2024 07:00  --------------------------------------------------------  IN: 480 mL / OUT: 800 mL / NET: -320 mL    08 Jan 2024 07:01  -  08 Jan 2024 15:28  --------------------------------------------------------  IN: 480 mL / OUT: 300 mL / NET: 180 mL        PHYSICAL EXAM:  Vital Signs Last 24 Hrs  T(C): 36.8 (08 Jan 2024 11:42), Max: 36.8 (08 Jan 2024 11:42)  T(F): 98.2 (08 Jan 2024 11:42), Max: 98.2 (08 Jan 2024 11:42)  HR: 90 (08 Jan 2024 11:42) (80 - 93)  BP: 134/86 (08 Jan 2024 11:42) (122/79 - 153/90)  BP(mean): --  RR: 18 (08 Jan 2024 11:42) (18 - 18)  SpO2: 97% (08 Jan 2024 11:42) (92% - 97%)    Parameters below as of 08 Jan 2024 11:42  Patient On (Oxygen Delivery Method): room air      CONSTITUTIONAL: NAD, well-developed  EYES: PERRLA; conjunctiva and sclera clear  RESPIRATORY: Normal respiratory effort; lungs are clear to auscultation bilaterally  CARDIOVASCULAR: Regular rate and rhythm, normal S1 and S2, no murmur; No lower extremity edema; Peripheral pulses are 2+ bilaterally  ABDOMEN: Nontender to palpation, normoactive bowel sounds  MUSCULOSKELETAL:  no clubbing or cyanosis of digits; no joint swelling or tenderness to palpation  PSYCH: A+O to person,; affect appropriate  NEUROLOGY: CN 2-12 are intact and symmetric; no gross sensory deficits   SKIN: No rashes; no palpable lesions

## 2024-01-08 NOTE — DISCHARGE NOTE PROVIDER - CARE PROVIDER_API CALL
[facility]  [facility]  [address] [place] [address]  Phone: [phone]  Fax: [phone]  Follow Up Time:    [facility]  [facility]  [address] [place] [id number]  Phone: [phone]  Fax: [phone]  Follow Up Time:

## 2024-01-08 NOTE — DISCHARGE NOTE NURSING/CASE MANAGEMENT/SOCIAL WORK - NSDCPEFALRISK_GEN_ALL_CORE
For information on Fall & Injury Prevention, visit: [email or web address] OR  [email or web address] OR  https://www.cdc.gov/steadi/patient.html For information on Fall & Injury Prevention, visit: [email or web address] OR  [email or web address] OR  https://www.cdc.gov/steadi/patient.html

## 2024-01-08 NOTE — DISCHARGE NOTE PROVIDER - NSDCMRMEDTOKEN_GEN_ALL_CORE_FT
Simple: Patient demonstrates quick and easy understanding ascorbic acid 500 mg oral tablet: 2 tab(s) orally once a day  carvedilol 3.125 mg oral tablet: 1 tab(s) orally 2 times a day if systolic blood pressure is &gt;100  cholecalciferol 125 mcg (5000 intl units) oral tablet: 1 tab(s) orally 2 times a day  folic acid 1 mg oral tablet: 1 tab(s) orally once a day  guaiFENesin 600 mg oral tablet, extended release: 1 tab(s) orally 2 times a day as needed  levothyroxine 75 mcg (0.075 mg) oral tablet: 1 tab(s) orally once a day  spironolactone 25 mg oral tablet: 1 tab(s) orally once a day  Xarelto 20 mg oral tablet: 1 tab(s) orally once a day   ascorbic acid 500 mg oral tablet: 2 tab(s) orally once a day  carvedilol 3.125 mg oral tablet: 1 tab(s) orally 2 times a day if systolic blood pressure is &gt;100  cholecalciferol 125 mcg (5000 intl units) oral tablet: 1 tab(s) orally 2 times a day  folic acid 1 mg oral tablet: 1 tab(s) orally once a day  guaiFENesin 600 mg oral tablet, extended release: 1 tab(s) orally 2 times a day as needed  levothyroxine 75 mcg (0.075 mg) oral tablet: 1 tab(s) orally once a day  Multiple Vitamins oral tablet: 1 tab(s) orally once a day  pantoprazole 40 mg oral delayed release tablet: 1 tab(s) orally once a day (before a meal)  predniSONE 20 mg oral tablet: 2 tab(s) orally once a day first dose to be taken on 1/9/24  spironolactone 25 mg oral tablet: 1 tab(s) orally once a day  Xarelto 20 mg oral tablet: 1 tab(s) orally once a day

## 2024-01-08 NOTE — PROGRESS NOTE ADULT - PROBLEM SELECTOR PLAN 1
Hypoxic respiratory failure secondary to RSV infection.  - Per son, the patient has no history of asthma, COPD or other primary pulmonary history. No smoking history  - Lower concern for bacterial infx: CXR clear and no leukocytosis  - Symptomatic management: standing DuoNebs and anti-tussives  - s/p prednisone 40mg on 1/5, with persistent hypoxia  - changed to solumedrol 40mg IV BID day 2, deescalated to daily 1/8, then plan to complete on pred 40mg 5 day course 1/10  - Ambulatory SpO2 90% on room air  - CT chest: Mosaic attenuation of the lung parenchyma with associated pulmonary nodules in bilateral lungs. Diffuse idiopathic pulmonary neuroendocrine cell hyperplasia is a consideration. Correlation with patient history and short-term follow-up CT is recommended to establish stability. Mildly dilated main pulmonary artery, suggestive of pulmonary hypertension  - Pulm consulted->TTE to eval pulm HTN (pending) and repeat CT in 6-8 weeks

## 2024-01-08 NOTE — DISCHARGE NOTE PROVIDER - NSDCCPCAREPLAN_GEN_ALL_CORE_FT
PRINCIPAL DISCHARGE DIAGNOSIS  Diagnosis: Hypoxia  Assessment and Plan of Treatment: if symptoms return or worsen, contact provider immediately  continue prednisone as prescribed  please establish and follow up with pulmonary outpatient      SECONDARY DISCHARGE DIAGNOSES  Diagnosis: Lung nodule  Assessment and Plan of Treatment: please follow up with repeat CT Chest in 6-8 weeks as outpatient   follow up with pulmonary as outpatient     PRINCIPAL DISCHARGE DIAGNOSIS  Diagnosis: Hypoxia  Assessment and Plan of Treatment: if symptoms return or worsen, contact provider immediately  continue prednisone as prescribed  please establish and follow up with pulmonary outpatient      SECONDARY DISCHARGE DIAGNOSES  Diagnosis: Lung nodule  Assessment and Plan of Treatment: please follow up with repeat CT Chest in 6-8 weeks as outpatient   follow up with pulmonary as outpatient    Diagnosis: Pulmonary hypertension  Assessment and Plan of Treatment: close follow up with pulmonologist outpatient     PRINCIPAL DISCHARGE DIAGNOSIS  Diagnosis: Hypoxia  Assessment and Plan of Treatment: secondary to RSV  if symptoms return or worsen, contact provider immediately  continue prednisone as prescribed  please establish and follow up with pulmonary outpatient      SECONDARY DISCHARGE DIAGNOSES  Diagnosis: Lung nodule  Assessment and Plan of Treatment: please follow up with repeat CT Chest in 6-8 weeks as outpatient   follow up with pulmonary as outpatient    Diagnosis: Pulmonary hypertension  Assessment and Plan of Treatment: close follow up with pulmonologist outpatient    Diagnosis: Chronic atrial fibrillation  Assessment and Plan of Treatment: Continue Xarelto  Atrial fibrillation is the most common heart rhythm problem & has the risk of stroke & heart attack  It helps if you control your blood pressure, not drink more than 1-2 alcohol drinks per day, cut down on caffeine, getting treatment for over active thyroid gland, & getting exercise  Call your doctor if you feel your heart racing or beating unusually, chest tightness or pain, lightheaded, faint, shortness of breath especially with exercise  It is important to take your heart medication as prescribed  You may be on anticoagulation which is very important to take as directed - you may need blood work to monitor drug levels    Diagnosis: Hypertension  Assessment and Plan of Treatment: Continue Coreg and Aldactone  Low salt diet  Activity as tolerated.  Take all medication as prescribed.  Follow up with your medical doctor for routine blood pressure monitoring at your next visit.  Notify your doctor if you have any of the following symptoms:   Dizziness, Lightheadedness, Blurry vision, Headache, Chest pain, Shortness of breath

## 2024-01-09 VITALS
TEMPERATURE: 98 F | SYSTOLIC BLOOD PRESSURE: 109 MMHG | RESPIRATION RATE: 19 BRPM | DIASTOLIC BLOOD PRESSURE: 72 MMHG | OXYGEN SATURATION: 91 % | HEART RATE: 94 BPM

## 2024-01-09 LAB
CULTURE RESULTS: SIGNIFICANT CHANGE UP
SPECIMEN SOURCE: SIGNIFICANT CHANGE UP

## 2024-01-09 PROCEDURE — 80048 BASIC METABOLIC PNL TOTAL CA: CPT

## 2024-01-09 PROCEDURE — 96374 THER/PROPH/DIAG INJ IV PUSH: CPT

## 2024-01-09 PROCEDURE — 83735 ASSAY OF MAGNESIUM: CPT

## 2024-01-09 PROCEDURE — 83880 ASSAY OF NATRIURETIC PEPTIDE: CPT

## 2024-01-09 PROCEDURE — 94640 AIRWAY INHALATION TREATMENT: CPT

## 2024-01-09 PROCEDURE — 84484 ASSAY OF TROPONIN QUANT: CPT

## 2024-01-09 PROCEDURE — 80053 COMPREHEN METABOLIC PANEL: CPT

## 2024-01-09 PROCEDURE — 82435 ASSAY OF BLOOD CHLORIDE: CPT

## 2024-01-09 PROCEDURE — 85730 THROMBOPLASTIN TIME PARTIAL: CPT

## 2024-01-09 PROCEDURE — 87086 URINE CULTURE/COLONY COUNT: CPT

## 2024-01-09 PROCEDURE — 85610 PROTHROMBIN TIME: CPT

## 2024-01-09 PROCEDURE — 87449 NOS EACH ORGANISM AG IA: CPT

## 2024-01-09 PROCEDURE — 96361 HYDRATE IV INFUSION ADD-ON: CPT

## 2024-01-09 PROCEDURE — 87070 CULTURE OTHR SPECIMN AEROBIC: CPT

## 2024-01-09 PROCEDURE — 87899 AGENT NOS ASSAY W/OPTIC: CPT

## 2024-01-09 PROCEDURE — 97162 PT EVAL MOD COMPLEX 30 MIN: CPT

## 2024-01-09 PROCEDURE — 82330 ASSAY OF CALCIUM: CPT

## 2024-01-09 PROCEDURE — 71250 CT THORAX DX C-: CPT

## 2024-01-09 PROCEDURE — 83690 ASSAY OF LIPASE: CPT

## 2024-01-09 PROCEDURE — 99239 HOSP IP/OBS DSCHRG MGMT >30: CPT

## 2024-01-09 PROCEDURE — 87637 SARSCOV2&INF A&B&RSV AMP PRB: CPT

## 2024-01-09 PROCEDURE — 99285 EMERGENCY DEPT VISIT HI MDM: CPT | Mod: 25

## 2024-01-09 PROCEDURE — 87040 BLOOD CULTURE FOR BACTERIA: CPT

## 2024-01-09 PROCEDURE — 85027 COMPLETE CBC AUTOMATED: CPT

## 2024-01-09 PROCEDURE — 87641 MR-STAPH DNA AMP PROBE: CPT

## 2024-01-09 PROCEDURE — 83605 ASSAY OF LACTIC ACID: CPT

## 2024-01-09 PROCEDURE — 93005 ELECTROCARDIOGRAM TRACING: CPT

## 2024-01-09 PROCEDURE — 82803 BLOOD GASES ANY COMBINATION: CPT

## 2024-01-09 PROCEDURE — 93306 TTE W/DOPPLER COMPLETE: CPT

## 2024-01-09 PROCEDURE — 82947 ASSAY GLUCOSE BLOOD QUANT: CPT

## 2024-01-09 PROCEDURE — 85025 COMPLETE CBC W/AUTO DIFF WBC: CPT

## 2024-01-09 PROCEDURE — 87640 STAPH A DNA AMP PROBE: CPT

## 2024-01-09 PROCEDURE — 85014 HEMATOCRIT: CPT

## 2024-01-09 PROCEDURE — 84132 ASSAY OF SERUM POTASSIUM: CPT

## 2024-01-09 PROCEDURE — 71045 X-RAY EXAM CHEST 1 VIEW: CPT

## 2024-01-09 PROCEDURE — 85018 HEMOGLOBIN: CPT

## 2024-01-09 PROCEDURE — 81001 URINALYSIS AUTO W/SCOPE: CPT

## 2024-01-09 PROCEDURE — 84295 ASSAY OF SERUM SODIUM: CPT

## 2024-01-09 RX ORDER — PANTOPRAZOLE SODIUM 20 MG/1
1 TABLET, DELAYED RELEASE ORAL
Qty: 30 | Refills: 0
Start: 2024-01-09 | End: 2024-02-07

## 2024-01-09 RX ORDER — SPIRONOLACTONE 25 MG/1
25 TABLET, FILM COATED ORAL DAILY
Refills: 0 | Status: DISCONTINUED | OUTPATIENT
Start: 2024-01-09 | End: 2024-01-09

## 2024-01-09 RX ADMIN — PANTOPRAZOLE SODIUM 40 MILLIGRAM(S): 20 TABLET, DELAYED RELEASE ORAL at 05:57

## 2024-01-09 RX ADMIN — Medication 1 TABLET(S): at 11:20

## 2024-01-09 RX ADMIN — Medication 40 MILLIGRAM(S): at 05:57

## 2024-01-09 RX ADMIN — CARVEDILOL PHOSPHATE 3.12 MILLIGRAM(S): 80 CAPSULE, EXTENDED RELEASE ORAL at 05:57

## 2024-01-09 RX ADMIN — Medication 75 MICROGRAM(S): at 05:57

## 2024-01-09 RX ADMIN — Medication 3 MILLILITER(S): at 05:58

## 2024-01-09 RX ADMIN — Medication 500 MILLIGRAM(S): at 11:20

## 2024-01-09 RX ADMIN — Medication 3 MILLILITER(S): at 11:20

## 2024-01-09 RX ADMIN — Medication 600 MILLIGRAM(S): at 05:57

## 2024-01-09 RX ADMIN — SPIRONOLACTONE 25 MILLIGRAM(S): 25 TABLET, FILM COATED ORAL at 08:28

## 2024-01-09 RX ADMIN — CHLORHEXIDINE GLUCONATE 1 APPLICATION(S): 213 SOLUTION TOPICAL at 11:20

## 2024-01-11 ENCOUNTER — APPOINTMENT (OUTPATIENT)
Dept: PULMONOLOGY | Facility: CLINIC | Age: 89
End: 2024-01-11

## 2024-01-11 ENCOUNTER — APPOINTMENT (OUTPATIENT)
Dept: UROLOGY | Facility: CLINIC | Age: 89
End: 2024-01-11

## 2024-01-11 ENCOUNTER — NON-APPOINTMENT (OUTPATIENT)
Age: 89
End: 2024-01-11

## 2024-01-12 ENCOUNTER — INPATIENT (INPATIENT)
Facility: HOSPITAL | Age: 89
LOS: 4 days | Discharge: NURSING, REHAB OR LONG-TERM CARE | DRG: 690 | End: 2024-01-17
Attending: STUDENT IN AN ORGANIZED HEALTH CARE EDUCATION/TRAINING PROGRAM | Admitting: STUDENT IN AN ORGANIZED HEALTH CARE EDUCATION/TRAINING PROGRAM
Payer: MEDICARE

## 2024-01-12 VITALS
OXYGEN SATURATION: 94 % | RESPIRATION RATE: 22 BRPM | DIASTOLIC BLOOD PRESSURE: 84 MMHG | HEART RATE: 76 BPM | HEIGHT: 62 IN | WEIGHT: 136.03 LBS | TEMPERATURE: 98 F | SYSTOLIC BLOOD PRESSURE: 143 MMHG

## 2024-01-12 DIAGNOSIS — N39.0 URINARY TRACT INFECTION, SITE NOT SPECIFIED: ICD-10-CM

## 2024-01-12 DIAGNOSIS — I10 ESSENTIAL (PRIMARY) HYPERTENSION: ICD-10-CM

## 2024-01-12 DIAGNOSIS — I27.20 PULMONARY HYPERTENSION, UNSPECIFIED: ICD-10-CM

## 2024-01-12 DIAGNOSIS — E03.9 HYPOTHYROIDISM, UNSPECIFIED: ICD-10-CM

## 2024-01-12 DIAGNOSIS — I48.20 CHRONIC ATRIAL FIBRILLATION, UNSPECIFIED: ICD-10-CM

## 2024-01-12 DIAGNOSIS — R91.1 SOLITARY PULMONARY NODULE: ICD-10-CM

## 2024-01-12 LAB
ALBUMIN SERPL ELPH-MCNC: 3.6 G/DL — SIGNIFICANT CHANGE UP (ref 3.3–5)
ALBUMIN SERPL ELPH-MCNC: 3.6 G/DL — SIGNIFICANT CHANGE UP (ref 3.3–5)
ALP SERPL-CCNC: 78 U/L — SIGNIFICANT CHANGE UP (ref 40–120)
ALP SERPL-CCNC: 78 U/L — SIGNIFICANT CHANGE UP (ref 40–120)
ALT FLD-CCNC: 14 U/L — SIGNIFICANT CHANGE UP (ref 10–45)
ALT FLD-CCNC: 14 U/L — SIGNIFICANT CHANGE UP (ref 10–45)
ANION GAP SERPL CALC-SCNC: 9 MMOL/L — SIGNIFICANT CHANGE UP (ref 5–17)
ANION GAP SERPL CALC-SCNC: 9 MMOL/L — SIGNIFICANT CHANGE UP (ref 5–17)
APPEARANCE UR: ABNORMAL
APPEARANCE UR: ABNORMAL
AST SERPL-CCNC: 13 U/L — SIGNIFICANT CHANGE UP (ref 10–40)
AST SERPL-CCNC: 13 U/L — SIGNIFICANT CHANGE UP (ref 10–40)
BACTERIA # UR AUTO: ABNORMAL /HPF
BACTERIA # UR AUTO: ABNORMAL /HPF
BASOPHILS # BLD AUTO: 0.09 K/UL — SIGNIFICANT CHANGE UP (ref 0–0.2)
BASOPHILS # BLD AUTO: 0.09 K/UL — SIGNIFICANT CHANGE UP (ref 0–0.2)
BASOPHILS NFR BLD AUTO: 0.7 % — SIGNIFICANT CHANGE UP (ref 0–2)
BASOPHILS NFR BLD AUTO: 0.7 % — SIGNIFICANT CHANGE UP (ref 0–2)
BILIRUB SERPL-MCNC: 0.6 MG/DL — SIGNIFICANT CHANGE UP (ref 0.2–1.2)
BILIRUB SERPL-MCNC: 0.6 MG/DL — SIGNIFICANT CHANGE UP (ref 0.2–1.2)
BILIRUB UR-MCNC: ABNORMAL
BILIRUB UR-MCNC: ABNORMAL
BUN SERPL-MCNC: 16 MG/DL — SIGNIFICANT CHANGE UP (ref 7–23)
BUN SERPL-MCNC: 16 MG/DL — SIGNIFICANT CHANGE UP (ref 7–23)
CALCIUM SERPL-MCNC: 10 MG/DL — SIGNIFICANT CHANGE UP (ref 8.4–10.5)
CALCIUM SERPL-MCNC: 10 MG/DL — SIGNIFICANT CHANGE UP (ref 8.4–10.5)
CAST: 13 /LPF — HIGH (ref 0–4)
CAST: 13 /LPF — HIGH (ref 0–4)
CHLORIDE SERPL-SCNC: 97 MMOL/L — SIGNIFICANT CHANGE UP (ref 96–108)
CHLORIDE SERPL-SCNC: 97 MMOL/L — SIGNIFICANT CHANGE UP (ref 96–108)
CO2 SERPL-SCNC: 27 MMOL/L — SIGNIFICANT CHANGE UP (ref 22–31)
CO2 SERPL-SCNC: 27 MMOL/L — SIGNIFICANT CHANGE UP (ref 22–31)
COLOR SPEC: ABNORMAL
COLOR SPEC: ABNORMAL
COMMENT - URINE 2: SIGNIFICANT CHANGE UP
COMMENT - URINE 2: SIGNIFICANT CHANGE UP
CREAT SERPL-MCNC: 0.47 MG/DL — LOW (ref 0.5–1.3)
CREAT SERPL-MCNC: 0.47 MG/DL — LOW (ref 0.5–1.3)
DIFF PNL FLD: ABNORMAL
DIFF PNL FLD: ABNORMAL
EGFR: 92 ML/MIN/1.73M2 — SIGNIFICANT CHANGE UP
EGFR: 92 ML/MIN/1.73M2 — SIGNIFICANT CHANGE UP
EOSINOPHIL # BLD AUTO: 0.04 K/UL — SIGNIFICANT CHANGE UP (ref 0–0.5)
EOSINOPHIL # BLD AUTO: 0.04 K/UL — SIGNIFICANT CHANGE UP (ref 0–0.5)
EOSINOPHIL NFR BLD AUTO: 0.3 % — SIGNIFICANT CHANGE UP (ref 0–6)
EOSINOPHIL NFR BLD AUTO: 0.3 % — SIGNIFICANT CHANGE UP (ref 0–6)
EPI CELLS # UR: PRESENT
EPI CELLS # UR: PRESENT
FLUAV AG NPH QL: SIGNIFICANT CHANGE UP
FLUAV AG NPH QL: SIGNIFICANT CHANGE UP
FLUBV AG NPH QL: SIGNIFICANT CHANGE UP
FLUBV AG NPH QL: SIGNIFICANT CHANGE UP
GLUCOSE SERPL-MCNC: 206 MG/DL — HIGH (ref 70–99)
GLUCOSE SERPL-MCNC: 206 MG/DL — HIGH (ref 70–99)
GLUCOSE UR QL: NEGATIVE MG/DL — SIGNIFICANT CHANGE UP
GLUCOSE UR QL: NEGATIVE MG/DL — SIGNIFICANT CHANGE UP
HCT VFR BLD CALC: 43.2 % — SIGNIFICANT CHANGE UP (ref 34.5–45)
HCT VFR BLD CALC: 43.2 % — SIGNIFICANT CHANGE UP (ref 34.5–45)
HGB BLD-MCNC: 14.5 G/DL — SIGNIFICANT CHANGE UP (ref 11.5–15.5)
HGB BLD-MCNC: 14.5 G/DL — SIGNIFICANT CHANGE UP (ref 11.5–15.5)
HYALINE CASTS # UR AUTO: PRESENT
HYALINE CASTS # UR AUTO: PRESENT
IMM GRANULOCYTES NFR BLD AUTO: 2.7 % — HIGH (ref 0–0.9)
IMM GRANULOCYTES NFR BLD AUTO: 2.7 % — HIGH (ref 0–0.9)
KETONES UR-MCNC: NEGATIVE MG/DL — SIGNIFICANT CHANGE UP
KETONES UR-MCNC: NEGATIVE MG/DL — SIGNIFICANT CHANGE UP
LEUKOCYTE ESTERASE UR-ACNC: ABNORMAL
LEUKOCYTE ESTERASE UR-ACNC: ABNORMAL
LYMPHOCYTES # BLD AUTO: 0.87 K/UL — LOW (ref 1–3.3)
LYMPHOCYTES # BLD AUTO: 0.87 K/UL — LOW (ref 1–3.3)
LYMPHOCYTES # BLD AUTO: 6.6 % — LOW (ref 13–44)
LYMPHOCYTES # BLD AUTO: 6.6 % — LOW (ref 13–44)
MCHC RBC-ENTMCNC: 30.3 PG — SIGNIFICANT CHANGE UP (ref 27–34)
MCHC RBC-ENTMCNC: 30.3 PG — SIGNIFICANT CHANGE UP (ref 27–34)
MCHC RBC-ENTMCNC: 33.6 GM/DL — SIGNIFICANT CHANGE UP (ref 32–36)
MCHC RBC-ENTMCNC: 33.6 GM/DL — SIGNIFICANT CHANGE UP (ref 32–36)
MCV RBC AUTO: 90.4 FL — SIGNIFICANT CHANGE UP (ref 80–100)
MCV RBC AUTO: 90.4 FL — SIGNIFICANT CHANGE UP (ref 80–100)
MONOCYTES # BLD AUTO: 0.41 K/UL — SIGNIFICANT CHANGE UP (ref 0–0.9)
MONOCYTES # BLD AUTO: 0.41 K/UL — SIGNIFICANT CHANGE UP (ref 0–0.9)
MONOCYTES NFR BLD AUTO: 3.1 % — SIGNIFICANT CHANGE UP (ref 2–14)
MONOCYTES NFR BLD AUTO: 3.1 % — SIGNIFICANT CHANGE UP (ref 2–14)
NEUTROPHILS # BLD AUTO: 11.51 K/UL — HIGH (ref 1.8–7.4)
NEUTROPHILS # BLD AUTO: 11.51 K/UL — HIGH (ref 1.8–7.4)
NEUTROPHILS NFR BLD AUTO: 86.6 % — HIGH (ref 43–77)
NEUTROPHILS NFR BLD AUTO: 86.6 % — HIGH (ref 43–77)
NITRITE UR-MCNC: POSITIVE
NITRITE UR-MCNC: POSITIVE
NRBC # BLD: 0 /100 WBCS — SIGNIFICANT CHANGE UP (ref 0–0)
NRBC # BLD: 0 /100 WBCS — SIGNIFICANT CHANGE UP (ref 0–0)
PH UR: >=9 (ref 5–8)
PH UR: >=9 (ref 5–8)
PLATELET # BLD AUTO: 270 K/UL — SIGNIFICANT CHANGE UP (ref 150–400)
PLATELET # BLD AUTO: 270 K/UL — SIGNIFICANT CHANGE UP (ref 150–400)
POTASSIUM SERPL-MCNC: 4.5 MMOL/L — SIGNIFICANT CHANGE UP (ref 3.5–5.3)
POTASSIUM SERPL-MCNC: 4.5 MMOL/L — SIGNIFICANT CHANGE UP (ref 3.5–5.3)
POTASSIUM SERPL-SCNC: 4.5 MMOL/L — SIGNIFICANT CHANGE UP (ref 3.5–5.3)
POTASSIUM SERPL-SCNC: 4.5 MMOL/L — SIGNIFICANT CHANGE UP (ref 3.5–5.3)
PROT SERPL-MCNC: 6.8 G/DL — SIGNIFICANT CHANGE UP (ref 6–8.3)
PROT SERPL-MCNC: 6.8 G/DL — SIGNIFICANT CHANGE UP (ref 6–8.3)
PROT UR-MCNC: 300 MG/DL
PROT UR-MCNC: 300 MG/DL
RBC # BLD: 4.78 M/UL — SIGNIFICANT CHANGE UP (ref 3.8–5.2)
RBC # BLD: 4.78 M/UL — SIGNIFICANT CHANGE UP (ref 3.8–5.2)
RBC # FLD: 12.8 % — SIGNIFICANT CHANGE UP (ref 10.3–14.5)
RBC # FLD: 12.8 % — SIGNIFICANT CHANGE UP (ref 10.3–14.5)
RBC CASTS # UR COMP ASSIST: ABNORMAL /HPF
RBC CASTS # UR COMP ASSIST: ABNORMAL /HPF
REVIEW: SIGNIFICANT CHANGE UP
REVIEW: SIGNIFICANT CHANGE UP
RSV RNA NPH QL NAA+NON-PROBE: DETECTED
RSV RNA NPH QL NAA+NON-PROBE: DETECTED
SARS-COV-2 RNA SPEC QL NAA+PROBE: SIGNIFICANT CHANGE UP
SARS-COV-2 RNA SPEC QL NAA+PROBE: SIGNIFICANT CHANGE UP
SODIUM SERPL-SCNC: 133 MMOL/L — LOW (ref 135–145)
SODIUM SERPL-SCNC: 133 MMOL/L — LOW (ref 135–145)
SP GR SPEC: 1.02 — SIGNIFICANT CHANGE UP (ref 1–1.03)
SP GR SPEC: 1.02 — SIGNIFICANT CHANGE UP (ref 1–1.03)
SQUAMOUS # UR AUTO: 0 /HPF — SIGNIFICANT CHANGE UP (ref 0–5)
SQUAMOUS # UR AUTO: 0 /HPF — SIGNIFICANT CHANGE UP (ref 0–5)
TRI-PHOS CRY UR QL COMP ASSIST: PRESENT
TRI-PHOS CRY UR QL COMP ASSIST: PRESENT
UROBILINOGEN FLD QL: 1 MG/DL — SIGNIFICANT CHANGE UP (ref 0.2–1)
UROBILINOGEN FLD QL: 1 MG/DL — SIGNIFICANT CHANGE UP (ref 0.2–1)
WBC # BLD: 13.28 K/UL — HIGH (ref 3.8–10.5)
WBC # BLD: 13.28 K/UL — HIGH (ref 3.8–10.5)
WBC # FLD AUTO: 13.28 K/UL — HIGH (ref 3.8–10.5)
WBC # FLD AUTO: 13.28 K/UL — HIGH (ref 3.8–10.5)
WBC UR QL: >50 /HPF — HIGH (ref 0–5)
WBC UR QL: >50 /HPF — HIGH (ref 0–5)
YEAST-LIKE CELLS: PRESENT
YEAST-LIKE CELLS: PRESENT

## 2024-01-12 PROCEDURE — 99223 1ST HOSP IP/OBS HIGH 75: CPT

## 2024-01-12 PROCEDURE — 99285 EMERGENCY DEPT VISIT HI MDM: CPT | Mod: GC

## 2024-01-12 PROCEDURE — 71045 X-RAY EXAM CHEST 1 VIEW: CPT | Mod: 26

## 2024-01-12 RX ORDER — LEVOTHYROXINE SODIUM 125 MCG
75 TABLET ORAL DAILY
Refills: 0 | Status: DISCONTINUED | OUTPATIENT
Start: 2024-01-12 | End: 2024-01-17

## 2024-01-12 RX ORDER — CEFTRIAXONE 500 MG/1
1000 INJECTION, POWDER, FOR SOLUTION INTRAMUSCULAR; INTRAVENOUS ONCE
Refills: 0 | Status: COMPLETED | OUTPATIENT
Start: 2024-01-12 | End: 2024-01-12

## 2024-01-12 RX ORDER — CEFTRIAXONE 500 MG/1
1000 INJECTION, POWDER, FOR SOLUTION INTRAMUSCULAR; INTRAVENOUS EVERY 24 HOURS
Refills: 0 | Status: COMPLETED | OUTPATIENT
Start: 2024-01-13 | End: 2024-01-15

## 2024-01-12 RX ORDER — RIVAROXABAN 15 MG-20MG
20 KIT ORAL
Refills: 0 | Status: DISCONTINUED | OUTPATIENT
Start: 2024-01-12 | End: 2024-01-17

## 2024-01-12 RX ORDER — CARVEDILOL PHOSPHATE 80 MG/1
3.12 CAPSULE, EXTENDED RELEASE ORAL EVERY 12 HOURS
Refills: 0 | Status: DISCONTINUED | OUTPATIENT
Start: 2024-01-12 | End: 2024-01-17

## 2024-01-12 RX ORDER — FOLIC ACID 0.8 MG
1 TABLET ORAL DAILY
Refills: 0 | Status: DISCONTINUED | OUTPATIENT
Start: 2024-01-12 | End: 2024-01-17

## 2024-01-12 RX ORDER — PANTOPRAZOLE SODIUM 20 MG/1
40 TABLET, DELAYED RELEASE ORAL
Refills: 0 | Status: DISCONTINUED | OUTPATIENT
Start: 2024-01-12 | End: 2024-01-17

## 2024-01-12 RX ORDER — SPIRONOLACTONE 25 MG/1
25 TABLET, FILM COATED ORAL DAILY
Refills: 0 | Status: DISCONTINUED | OUTPATIENT
Start: 2024-01-12 | End: 2024-01-17

## 2024-01-12 RX ORDER — CEFDINIR 300 MG/1
300 CAPSULE ORAL
Qty: 14 | Refills: 0 | Status: ACTIVE | COMMUNITY
Start: 2024-01-12 | End: 1900-01-01

## 2024-01-12 RX ADMIN — CEFTRIAXONE 100 MILLIGRAM(S): 500 INJECTION, POWDER, FOR SOLUTION INTRAMUSCULAR; INTRAVENOUS at 19:00

## 2024-01-12 RX ADMIN — CARVEDILOL PHOSPHATE 3.12 MILLIGRAM(S): 80 CAPSULE, EXTENDED RELEASE ORAL at 21:19

## 2024-01-12 RX ADMIN — RIVAROXABAN 20 MILLIGRAM(S): KIT at 21:19

## 2024-01-12 NOTE — ED PROVIDER NOTE - PHYSICAL EXAMINATION
Attending note.  Patient is alert and in no acute distress.  On 1.5 L/min oxygen, oxygen saturation is 95%.  Cardiac monitor shows atrial fibrillation at a rate of 70.  There is no pallor or jaundice.  Patient has coarse breath sounds bilaterally.  Heart is irregularly irregular.  There is a perm pacemaker on the left chest.  Abdomen is soft, nontender nondistended.  There is no CVA tenderness.  Pelvic exam is deferred until chaperone is available.  There is no ankle edema.  Neurologic examination is grossly intact.  There is no petechiae.  There is no ecchymosis.

## 2024-01-12 NOTE — H&P ADULT - PROBLEM SELECTOR PLAN 1
History of recurrent UTIs (1/month)  -CTX for now  -f/u Ucx  -will consider Methenamine hippurate once completed course  -concern deconditioning increasing risk of UTIs as well->PT and consider DIXIE

## 2024-01-12 NOTE — ED PROVIDER NOTE - NSICDXPASTMEDICALHX_GEN_ALL_CORE_FT
PAST MEDICAL HISTORY:  Afib     DM (diabetes mellitus)     HLD (hyperlipidemia)     HTN (hypertension)     Hypothyroid     Pulmonary hypertension

## 2024-01-12 NOTE — H&P ADULT - HISTORY OF PRESENT ILLNESS
87 y/o F presenting with dysuria and urinary bleeding for 1 day    HPI: 87 y/o F with a h/o HTN, chronic afib (s/p ppm), hypothyroidism, dementia (AO*1-2 conversant), newly diagnosed severe pulm HTN and unspecified lung disease p/w urinary bleeding for 1 day. Patient was recently admitted for RSV requiring O2. s/p steroids with improvement. During work up, noted to have new severe pulm HTN (PASP71) and pulm nodules. Was recommended for RHC, deffered to outpatient, as well as repeat CT chest in 6-8weeks. Was d/raman home on po prednisone.  At home, although was not d/raman with O2, was using 1.5L NC. Since discharge noted to be week, unable to get out of bed (previously able to walk minimal amounts). Family and aides having difficult time managing iadls. Day prior to admission, endorsing new dysuria. In diaper noted to have bleeding. No melena/hematemesis. Denies f/chills/cough/sob. Has been taking home ac reliably. For this reason presented to the ED.    In the ED afeb hds, O2 95% on RA and 2L. Labs notable for WBC 13, hgb wnl, UA grossly +. Received CTX 89 y/o F presenting with dysuria and urinary bleeding for 1 day    HPI: 89 y/o F with a h/o HTN, chronic afib (s/p ppm), hypothyroidism, dementia (AO*1-2 conversant), newly diagnosed severe pulm HTN and unspecified lung disease p/w urinary bleeding for 1 day. Patient was recently admitted for RSV requiring O2. s/p steroids with improvement. During work up, noted to have new severe pulm HTN (PASP71) and pulm nodules. Was recommended for RHC, deffered to outpatient, as well as repeat CT chest in 6-8weeks. Was d/raman home on po prednisone.  At home, although was not d/raman with O2, was using 1.5L NC. Since discharge noted to be week, unable to get out of bed (previously able to walk minimal amounts). Family and aides having difficult time managing iadls. Day prior to admission, endorsing new dysuria. In diaper noted to have bleeding. No melena/hematemesis. Denies f/chills/cough/sob. Has been taking home ac reliably. For this reason presented to the ED.    In the ED afeb hds, O2 95% on RA and 2L. Labs notable for WBC 13, hgb wnl, UA grossly +. Received CTX

## 2024-01-12 NOTE — PATIENT PROFILE ADULT - FUNCTIONAL ASSESSMENT - BASIC MOBILITY 6.
2-calculated by average/Not able to assess (calculate score using [facility] averaging method) 2-calculated by average/Not able to assess (calculate score using [facility] averaging method)

## 2024-01-12 NOTE — ED ADULT NURSE REASSESSMENT NOTE - NS ED NURSE REASSESS COMMENT FT1
Break coverage RN attempted to call [phone] for report. RN told floor nurse Lakshmi is currently in patient room and to call back.

## 2024-01-12 NOTE — ED PROVIDER NOTE - OBJECTIVE STATEMENT
Attending note.  Patient was seen in Gold room #11.  History is from the patient and her son.  Patient was recently admitted to the hospital from last Thursday to this past Tuesday for shortness of breath, RSV, cough.  CT scan showed pulmonary nodules and fibrosis and patient was also diagnosed with pulmonary hypertension.  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  Patient denies any complaints.  There is a home health aide 24/7 who noticed blood in the diaper today.  There are no fevers, chills or rigors at home.  There is no diarrhea or vomiting.  Patient has a good appetite.  The son reports several urinary tract infections in the last 12 months, with the last being 1/2 to 2 months ago.  Patient thinks Xarelto for chronic atrial fibrillation and was discharged with prednisone.  She has no bleeding from other sites.  Her cough has improved.  Oxygen saturation on 1.5 L/min has been approximately 95%.  Blood pressure and heart rate are monitored twice daily per the son and has been stable.

## 2024-01-12 NOTE — H&P ADULT - PROBLEM SELECTOR PLAN 2
severe newly diagnosed on last admission via CT and TTE. Also with unspecified lung dz. Currently using 1.5L NC at home  -continue spironolactone  -O2prn  -Planned for RHC for further elucidation at some pt, previously deferred to outpatient

## 2024-01-12 NOTE — ED ADULT NURSE REASSESSMENT NOTE - NS ED NURSE REASSESS COMMENT FT1
Report received from JAYRO Olmos. Initial nurse note not written by previous RN. Pt resting in stretcher in gold room 11. A&Ox3. VSS. IV patent and flushes without difficulty. Pt denies any pain at this time. Plan of care discussed with pt, pt verbalized understanding. Pt and family member educated on isolation precautions, verbalized understanding. Stretcher locked and in lowest position, appropriate side rails up. Pt instructed to notify RN if assistance is needed. Pending admitted awaiting bed placement.

## 2024-01-12 NOTE — ED PROVIDER NOTE - DIFFERENTIAL DIAGNOSIS
Differential for blood in the diaper not limited to hemorrhagic cystitis versus vaginal bleeding versus rectal bleeding versus factitious Differential Diagnosis

## 2024-01-12 NOTE — ED PROVIDER NOTE - CLINICAL SUMMARY MEDICAL DECISION MAKING FREE TEXT BOX
Attending note.  Patient found this morning by home health aide with blood in the diaper.  See differential above.  CBC, urinalysis, pelvic examination with chaperone when available.  Discussion with son as to preference for admission versus discharge.  Prefers discharge if no significant findings.

## 2024-01-12 NOTE — H&P ADULT - CONVERSATION DETAILS
discussed code status as well as focusing on quality of life measures going forward given recurrent hospitalizations and deconditioning. Son would like to talk to his wife how best to proceed. For now full code

## 2024-01-12 NOTE — H&P ADULT - ASSESSMENT
87 y/o F with a h/o HTN, chronic afib (s/p ppm), hypothyroidism, dementia (AO*1-2 conversant), newly diagnosed severe pulm HTN presenting with a UTI.  89 y/o F with a h/o HTN, chronic afib (s/p ppm), hypothyroidism, dementia (AO*1-2 conversant), newly diagnosed severe pulm HTN presenting with a UTI.

## 2024-01-12 NOTE — H&P ADULT - PROBLEM SELECTOR PLAN 4
recent CT with mosaic attenuation of lung parenchyma and b/l pulm nodules  -repeat CT in 6-8weeks  -O2 prn  -not currently on inhalers

## 2024-01-12 NOTE — ED PROVIDER NOTE - NS_BEDUNITTYPES_ED_ALL_ED
75 y/o male w/ strong FHx CAD (mother and father both passed away from MI) and PMHx HTN, HLD, CAD (s/p prior PCI's. most recent in 2014 at [place]) and erectile dysfunction with Class 2 angina and known progression of CAD by Dx Cardiac Cath now presents for recommended staged PCI of LCx & RCA lesions.    -NPO for cath today with Dr Kirkland  -CBC and BMP reviewed.   -Give IVF Bolus 250cc x 1 now then start NS 100cc/hr pre-cath hydration  -Patient took Home dose ASA & Plavix this am and reports compliance with taking all medications.      Risks & benefits of procedure and alternative therapy have been explained to the patient including but not limited to: allergic reaction, bleeding w/possible need for blood transfusion, infection, renal and vascular compromise, limb damage, arrhythmia, stroke, vessel dissection/perforation, Myocardial infarction, emergent CABG. Informed consent obtained and in chart.  MEDICINE

## 2024-01-12 NOTE — ED ADULT NURSE REASSESSMENT NOTE - NS ED NURSE REASSESS COMMENT FT1
Straight cath patient per MD Walter. Sterile technique used. 2 RNs at bedside. 75 mL drained. Urine is dark hubert. Pt tolerated procedure well. Urine samples drawn and sent.

## 2024-01-12 NOTE — PATIENT PROFILE ADULT - FALL HARM RISK - HARM RISK INTERVENTIONS
Assistance OOB with selected safe patient handling equipment/Communicate Risk of Fall with Harm to all staff/Discuss with provider need for PT consult/Monitor gait and stability/Provide patient with walking aids - walker, cane, crutches/Reinforce activity limits and safety measures with patient and family/Tailored Fall Risk Interventions/Visual Cue: Yellow wristband and red socks/Bed in lowest position, wheels locked, appropriate side rails in place/Call bell, personal items and telephone in reach/Instruct patient to call for assistance before getting out of bed or chair/Non-slip footwear when patient is out of bed/[place] to call system/Physically safe environment - no spills, clutter or unnecessary equipment/Purposeful Proactive Rounding/Room/bathroom lighting operational, light cord in reach Assistance OOB with selected safe patient handling equipment/Communicate Risk of Fall with Harm to all staff/Discuss with provider need for PT consult/Monitor gait and stability/Provide patient with walking aids - walker, cane, crutches/Reinforce activity limits and safety measures with patient and family/Tailored Fall Risk Interventions/Visual Cue: Yellow wristband and red socks/Bed in lowest position, wheels locked, appropriate side rails in place/Call bell, personal items and telephone in reach/Instruct patient to call for assistance before getting out of bed or chair/Non-slip footwear when patient is out of bed/[place] to call system/Physically safe environment - no spills, clutter or unnecessary equipment/Purposeful Proactive Rounding/Room/bathroom lighting operational, light cord in reach

## 2024-01-13 DIAGNOSIS — Z71.89 OTHER SPECIFIED COUNSELING: ICD-10-CM

## 2024-01-13 LAB
ALBUMIN SERPL ELPH-MCNC: 3.1 G/DL — LOW (ref 3.3–5)
ALBUMIN SERPL ELPH-MCNC: 3.1 G/DL — LOW (ref 3.3–5)
ALP SERPL-CCNC: 70 U/L — SIGNIFICANT CHANGE UP (ref 40–120)
ALP SERPL-CCNC: 70 U/L — SIGNIFICANT CHANGE UP (ref 40–120)
ALT FLD-CCNC: 12 U/L — SIGNIFICANT CHANGE UP (ref 10–45)
ALT FLD-CCNC: 12 U/L — SIGNIFICANT CHANGE UP (ref 10–45)
ANION GAP SERPL CALC-SCNC: 7 MMOL/L — SIGNIFICANT CHANGE UP (ref 5–17)
ANION GAP SERPL CALC-SCNC: 7 MMOL/L — SIGNIFICANT CHANGE UP (ref 5–17)
AST SERPL-CCNC: 12 U/L — SIGNIFICANT CHANGE UP (ref 10–40)
AST SERPL-CCNC: 12 U/L — SIGNIFICANT CHANGE UP (ref 10–40)
BILIRUB SERPL-MCNC: 0.5 MG/DL — SIGNIFICANT CHANGE UP (ref 0.2–1.2)
BILIRUB SERPL-MCNC: 0.5 MG/DL — SIGNIFICANT CHANGE UP (ref 0.2–1.2)
BUN SERPL-MCNC: 17 MG/DL — SIGNIFICANT CHANGE UP (ref 7–23)
BUN SERPL-MCNC: 17 MG/DL — SIGNIFICANT CHANGE UP (ref 7–23)
CALCIUM SERPL-MCNC: 9.5 MG/DL — SIGNIFICANT CHANGE UP (ref 8.4–10.5)
CALCIUM SERPL-MCNC: 9.5 MG/DL — SIGNIFICANT CHANGE UP (ref 8.4–10.5)
CHLORIDE SERPL-SCNC: 102 MMOL/L — SIGNIFICANT CHANGE UP (ref 96–108)
CHLORIDE SERPL-SCNC: 102 MMOL/L — SIGNIFICANT CHANGE UP (ref 96–108)
CO2 SERPL-SCNC: 28 MMOL/L — SIGNIFICANT CHANGE UP (ref 22–31)
CO2 SERPL-SCNC: 28 MMOL/L — SIGNIFICANT CHANGE UP (ref 22–31)
CREAT SERPL-MCNC: 0.47 MG/DL — LOW (ref 0.5–1.3)
CREAT SERPL-MCNC: 0.47 MG/DL — LOW (ref 0.5–1.3)
EGFR: 92 ML/MIN/1.73M2 — SIGNIFICANT CHANGE UP
EGFR: 92 ML/MIN/1.73M2 — SIGNIFICANT CHANGE UP
GLUCOSE SERPL-MCNC: 93 MG/DL — SIGNIFICANT CHANGE UP (ref 70–99)
GLUCOSE SERPL-MCNC: 93 MG/DL — SIGNIFICANT CHANGE UP (ref 70–99)
HCT VFR BLD CALC: 43.1 % — SIGNIFICANT CHANGE UP (ref 34.5–45)
HCT VFR BLD CALC: 43.1 % — SIGNIFICANT CHANGE UP (ref 34.5–45)
HGB BLD-MCNC: 13.6 G/DL — SIGNIFICANT CHANGE UP (ref 11.5–15.5)
HGB BLD-MCNC: 13.6 G/DL — SIGNIFICANT CHANGE UP (ref 11.5–15.5)
MCHC RBC-ENTMCNC: 29.1 PG — SIGNIFICANT CHANGE UP (ref 27–34)
MCHC RBC-ENTMCNC: 29.1 PG — SIGNIFICANT CHANGE UP (ref 27–34)
MCHC RBC-ENTMCNC: 31.6 GM/DL — LOW (ref 32–36)
MCHC RBC-ENTMCNC: 31.6 GM/DL — LOW (ref 32–36)
MCV RBC AUTO: 92.3 FL — SIGNIFICANT CHANGE UP (ref 80–100)
MCV RBC AUTO: 92.3 FL — SIGNIFICANT CHANGE UP (ref 80–100)
NRBC # BLD: 0 /100 WBCS — SIGNIFICANT CHANGE UP (ref 0–0)
NRBC # BLD: 0 /100 WBCS — SIGNIFICANT CHANGE UP (ref 0–0)
PLATELET # BLD AUTO: 243 K/UL — SIGNIFICANT CHANGE UP (ref 150–400)
PLATELET # BLD AUTO: 243 K/UL — SIGNIFICANT CHANGE UP (ref 150–400)
POTASSIUM SERPL-MCNC: 4.3 MMOL/L — SIGNIFICANT CHANGE UP (ref 3.5–5.3)
POTASSIUM SERPL-MCNC: 4.3 MMOL/L — SIGNIFICANT CHANGE UP (ref 3.5–5.3)
POTASSIUM SERPL-SCNC: 4.3 MMOL/L — SIGNIFICANT CHANGE UP (ref 3.5–5.3)
POTASSIUM SERPL-SCNC: 4.3 MMOL/L — SIGNIFICANT CHANGE UP (ref 3.5–5.3)
PROT SERPL-MCNC: 6.1 G/DL — SIGNIFICANT CHANGE UP (ref 6–8.3)
PROT SERPL-MCNC: 6.1 G/DL — SIGNIFICANT CHANGE UP (ref 6–8.3)
RBC # BLD: 4.67 M/UL — SIGNIFICANT CHANGE UP (ref 3.8–5.2)
RBC # BLD: 4.67 M/UL — SIGNIFICANT CHANGE UP (ref 3.8–5.2)
RBC # FLD: 12.9 % — SIGNIFICANT CHANGE UP (ref 10.3–14.5)
RBC # FLD: 12.9 % — SIGNIFICANT CHANGE UP (ref 10.3–14.5)
SODIUM SERPL-SCNC: 137 MMOL/L — SIGNIFICANT CHANGE UP (ref 135–145)
SODIUM SERPL-SCNC: 137 MMOL/L — SIGNIFICANT CHANGE UP (ref 135–145)
WBC # BLD: 10.97 K/UL — HIGH (ref 3.8–10.5)
WBC # BLD: 10.97 K/UL — HIGH (ref 3.8–10.5)
WBC # FLD AUTO: 10.97 K/UL — HIGH (ref 3.8–10.5)
WBC # FLD AUTO: 10.97 K/UL — HIGH (ref 3.8–10.5)

## 2024-01-13 PROCEDURE — 99232 SBSQ HOSP IP/OBS MODERATE 35: CPT

## 2024-01-13 RX ADMIN — CARVEDILOL PHOSPHATE 3.12 MILLIGRAM(S): 80 CAPSULE, EXTENDED RELEASE ORAL at 18:11

## 2024-01-13 RX ADMIN — PANTOPRAZOLE SODIUM 40 MILLIGRAM(S): 20 TABLET, DELAYED RELEASE ORAL at 06:19

## 2024-01-13 RX ADMIN — CEFTRIAXONE 100 MILLIGRAM(S): 500 INJECTION, POWDER, FOR SOLUTION INTRAMUSCULAR; INTRAVENOUS at 20:03

## 2024-01-13 RX ADMIN — SPIRONOLACTONE 25 MILLIGRAM(S): 25 TABLET, FILM COATED ORAL at 06:18

## 2024-01-13 RX ADMIN — Medication 75 MICROGRAM(S): at 06:19

## 2024-01-13 RX ADMIN — RIVAROXABAN 20 MILLIGRAM(S): KIT at 18:11

## 2024-01-13 RX ADMIN — CARVEDILOL PHOSPHATE 3.12 MILLIGRAM(S): 80 CAPSULE, EXTENDED RELEASE ORAL at 06:19

## 2024-01-13 RX ADMIN — Medication 1 MILLIGRAM(S): at 13:31

## 2024-01-13 RX ADMIN — Medication 1 TABLET(S): at 13:31

## 2024-01-13 NOTE — PHYSICAL THERAPY INITIAL EVALUATION ADULT - NSPTDISCHREC_GEN_A_CORE
If pt d/c home would require home PT, assist with all mobility/ADLs, & DME: RW & polyfly w/c with: anti-tippers, seat belt, swing away leg rests, & removable arm rests./[facility]-acute Rehab

## 2024-01-13 NOTE — PHYSICAL THERAPY INITIAL EVALUATION ADULT - IMPAIRMENTS CONTRIBUTING TO GAIT DEVIATIONS, PT EVAL
"Individual Psychotherapy (PhD/LCSW)    2019    Site:  [facility]         impaired balance/decreased flexibility/narrow base of support/decreased strength

## 2024-01-13 NOTE — PHYSICAL THERAPY INITIAL EVALUATION ADULT - PERTINENT HX OF CURRENT PROBLEM, REHAB EVAL
Pt is a 87 y/o F with a h/o HTN, chronic afib (s/p ppm), hypothyroidism, dementia (AO*1-2 conversant), newly diagnosed severe pulm HTN presenting with a UTI. CXR 1/12/24: Right lower lung linear atelectasis but otherwise clear lungs.

## 2024-01-13 NOTE — PROGRESS NOTE ADULT - SUBJECTIVE AND OBJECTIVE BOX
[facility] Division of Hospital Medicine  Pineda Oakley  MS Teams      SUBJECTIVE / OVERNIGHT EVENTS:  No events overnight  denies ab pain/dysuria this am  urinating without difficulty    ADDITIONAL REVIEW OF SYSTEMS:    MEDICATIONS  (STANDING):  carvedilol 3.125 milliGRAM(s) Oral every 12 hours  cefTRIAXone   IVPB 1000 milliGRAM(s) IV Intermittent every 24 hours  folic acid 1 milliGRAM(s) Oral daily  levothyroxine 75 MICROGram(s) Oral daily  multivitamin 1 Tablet(s) Oral daily  pantoprazole    Tablet 40 milliGRAM(s) Oral before breakfast  rivaroxaban 20 milliGRAM(s) Oral with dinner  spironolactone 25 milliGRAM(s) Oral daily    MEDICATIONS  (PRN):      I&O's Summary      PHYSICAL EXAM:  Vital Signs Last 24 Hrs  T(C): 36.3 (13 Jan 2024 04:29), Max: 36.8 (12 Jan 2024 13:40)  T(F): 97.4 (13 Jan 2024 04:29), Max: 98.3 (12 Jan 2024 13:40)  HR: 72 (13 Jan 2024 09:51) (61 - 82)  BP: 101/67 (13 Jan 2024 09:51) (101/67 - 143/84)  BP(mean): 97 (12 Jan 2024 21:00) (89 - 97)  RR: 18 (13 Jan 2024 04:29) (16 - 22)  SpO2: 96% (13 Jan 2024 09:51) (94% - 99%)    Parameters below as of 13 Jan 2024 09:51  Patient On (Oxygen Delivery Method): nasal cannula  O2 Flow (L/min): 2    CONSTITUTIONAL: NAD, well-developed  RESPIRATORY: Normal respiratory effort; lungs are clear to auscultation bilaterally  CARDIOVASCULAR: Regular rate and rhythm, normal S1 and S2, no murmur: No lower extremity edema  ABDOMEN: Nontender to palpation, normoactive bowel sounds, no rebound/guarding  MUSCULOSKELETAL: no clubbing or cyanosis of digits; no joint swelling or tenderness to palpation  PSYCH: A+O to person, place; affect appropriate  NEUROLOGY: CN 2-12 are intact and symmetric; no gross sensory deficits   SKIN: No rashes; no palpable lesions    LABS:                        13.6   10.97 )-----------( 243      ( 13 Jan 2024 07:14 )             43.1     01-13    137  |  102  |  17  ----------------------------<  93  4.3   |  28  |  0.47<L>    Ca    9.5      13 Jan 2024 07:14    TPro  6.1  /  Alb  3.1<L>  /  TBili  0.5  /  DBili  x   /  AST  12  /  ALT  12  /  AlkPhos  70  01-13        ucx pending [facility] Division of Hospital Medicine  Pineda Oakley  MS Teams      SUBJECTIVE / OVERNIGHT EVENTS:  No events overnight  denies ab pain/dysuria this am  urinating without difficulty    ADDITIONAL REVIEW OF SYSTEMS:    MEDICATIONS  (STANDING):  carvedilol 3.125 milliGRAM(s) Oral every 12 hours  cefTRIAXone   IVPB 1000 milliGRAM(s) IV Intermittent every 24 hours  folic acid 1 milliGRAM(s) Oral daily  levothyroxine 75 MICROGram(s) Oral daily  multivitamin 1 Tablet(s) Oral daily  pantoprazole    Tablet 40 milliGRAM(s) Oral before breakfast  rivaroxaban 20 milliGRAM(s) Oral with dinner  spironolactone 25 milliGRAM(s) Oral daily    MEDICATIONS  (PRN):      I&O's Summary      PHYSICAL EXAM:  Vital Signs Last 24 Hrs  T(C): 36.3 (13 Jan 2024 04:29), Max: 36.8 (12 Jan 2024 13:40)  T(F): 97.4 (13 Jan 2024 04:29), Max: 98.3 (12 Jan 2024 13:40)  HR: 72 (13 Jan 2024 09:51) (61 - 82)  BP: 101/67 (13 Jan 2024 09:51) (101/67 - 143/84)  BP(mean): 97 (12 Jan 2024 21:00) (89 - 97)  RR: 18 (13 Jan 2024 04:29) (16 - 22)  SpO2: 96% (13 Jan 2024 09:51) (94% - 99%)    Parameters below as of 13 Jan 2024 09:51  Patient On (Oxygen Delivery Method): nasal cannula  O2 Flow (L/min): 2    CONSTITUTIONAL: NAD, well-developed  RESPIRATORY: Normal respiratory effort; lungs are clear to auscultation bilaterally  CARDIOVASCULAR: Regular rate and rhythm, normal S1 and S2, no murmur: No lower extremity edema  ABDOMEN: Nontender to palpation, normoactive bowel sounds, no rebound/guarding  MUSCULOSKELETAL: no clubbing or cyanosis of digits; no joint swelling or tenderness to palpation  PSYCH: A+O to person, place; affect appropriate  NEUROLOGY: CN 2-12 are intact and symmetric; no gross sensory deficits   SKIN: No rashes; no palpable lesions    LABS:                        13.6   10.97 )-----------( 243      ( 13 Jan 2024 07:14 )             43.1     01-13    137  |  102  |  17  ----------------------------<  93  4.3   |  28  |  0.47<L>    Ca    9.5      13 Jan 2024 07:14    TPro  6.1  /  Alb  3.1<L>  /  TBili  0.5  /  DBili  x   /  AST  12  /  ALT  12  /  AlkPhos  70  01-13        ucx pending

## 2024-01-13 NOTE — PHYSICAL THERAPY INITIAL EVALUATION ADULT - ADDITIONAL COMMENTS
Pt is a poor historian as pt with history of dementia. Pt reports she lives with her son and daughter in law in a house with 12 steps then 7 steps to enter and steps insides. Pt reports she uses a RW for ambulation. Pt reports she has a HHA who comes everyday, however unable to state how many hours the HHA is present to assist. Pt reports her son and daughter in law are available to assist when needed. +reading eyeglasses.

## 2024-01-14 LAB
ANION GAP SERPL CALC-SCNC: 7 MMOL/L — SIGNIFICANT CHANGE UP (ref 5–17)
ANION GAP SERPL CALC-SCNC: 7 MMOL/L — SIGNIFICANT CHANGE UP (ref 5–17)
BUN SERPL-MCNC: 27 MG/DL — HIGH (ref 7–23)
BUN SERPL-MCNC: 27 MG/DL — HIGH (ref 7–23)
CALCIUM SERPL-MCNC: 9.4 MG/DL — SIGNIFICANT CHANGE UP (ref 8.4–10.5)
CALCIUM SERPL-MCNC: 9.4 MG/DL — SIGNIFICANT CHANGE UP (ref 8.4–10.5)
CHLORIDE SERPL-SCNC: 105 MMOL/L — SIGNIFICANT CHANGE UP (ref 96–108)
CHLORIDE SERPL-SCNC: 105 MMOL/L — SIGNIFICANT CHANGE UP (ref 96–108)
CO2 SERPL-SCNC: 28 MMOL/L — SIGNIFICANT CHANGE UP (ref 22–31)
CO2 SERPL-SCNC: 28 MMOL/L — SIGNIFICANT CHANGE UP (ref 22–31)
CREAT SERPL-MCNC: 0.52 MG/DL — SIGNIFICANT CHANGE UP (ref 0.5–1.3)
CREAT SERPL-MCNC: 0.52 MG/DL — SIGNIFICANT CHANGE UP (ref 0.5–1.3)
EGFR: 90 ML/MIN/1.73M2 — SIGNIFICANT CHANGE UP
EGFR: 90 ML/MIN/1.73M2 — SIGNIFICANT CHANGE UP
GLUCOSE SERPL-MCNC: 117 MG/DL — HIGH (ref 70–99)
GLUCOSE SERPL-MCNC: 117 MG/DL — HIGH (ref 70–99)
HCT VFR BLD CALC: 41.9 % — SIGNIFICANT CHANGE UP (ref 34.5–45)
HCT VFR BLD CALC: 41.9 % — SIGNIFICANT CHANGE UP (ref 34.5–45)
HGB BLD-MCNC: 13.1 G/DL — SIGNIFICANT CHANGE UP (ref 11.5–15.5)
HGB BLD-MCNC: 13.1 G/DL — SIGNIFICANT CHANGE UP (ref 11.5–15.5)
MCHC RBC-ENTMCNC: 29.2 PG — SIGNIFICANT CHANGE UP (ref 27–34)
MCHC RBC-ENTMCNC: 29.2 PG — SIGNIFICANT CHANGE UP (ref 27–34)
MCHC RBC-ENTMCNC: 31.3 GM/DL — LOW (ref 32–36)
MCHC RBC-ENTMCNC: 31.3 GM/DL — LOW (ref 32–36)
MCV RBC AUTO: 93.5 FL — SIGNIFICANT CHANGE UP (ref 80–100)
MCV RBC AUTO: 93.5 FL — SIGNIFICANT CHANGE UP (ref 80–100)
NRBC # BLD: 0 /100 WBCS — SIGNIFICANT CHANGE UP (ref 0–0)
NRBC # BLD: 0 /100 WBCS — SIGNIFICANT CHANGE UP (ref 0–0)
PLATELET # BLD AUTO: 253 K/UL — SIGNIFICANT CHANGE UP (ref 150–400)
PLATELET # BLD AUTO: 253 K/UL — SIGNIFICANT CHANGE UP (ref 150–400)
POTASSIUM SERPL-MCNC: 4.5 MMOL/L — SIGNIFICANT CHANGE UP (ref 3.5–5.3)
POTASSIUM SERPL-MCNC: 4.5 MMOL/L — SIGNIFICANT CHANGE UP (ref 3.5–5.3)
POTASSIUM SERPL-SCNC: 4.5 MMOL/L — SIGNIFICANT CHANGE UP (ref 3.5–5.3)
POTASSIUM SERPL-SCNC: 4.5 MMOL/L — SIGNIFICANT CHANGE UP (ref 3.5–5.3)
RBC # BLD: 4.48 M/UL — SIGNIFICANT CHANGE UP (ref 3.8–5.2)
RBC # BLD: 4.48 M/UL — SIGNIFICANT CHANGE UP (ref 3.8–5.2)
RBC # FLD: 13.1 % — SIGNIFICANT CHANGE UP (ref 10.3–14.5)
RBC # FLD: 13.1 % — SIGNIFICANT CHANGE UP (ref 10.3–14.5)
SODIUM SERPL-SCNC: 140 MMOL/L — SIGNIFICANT CHANGE UP (ref 135–145)
SODIUM SERPL-SCNC: 140 MMOL/L — SIGNIFICANT CHANGE UP (ref 135–145)
WBC # BLD: 9.36 K/UL — SIGNIFICANT CHANGE UP (ref 3.8–10.5)
WBC # BLD: 9.36 K/UL — SIGNIFICANT CHANGE UP (ref 3.8–10.5)
WBC # FLD AUTO: 9.36 K/UL — SIGNIFICANT CHANGE UP (ref 3.8–10.5)
WBC # FLD AUTO: 9.36 K/UL — SIGNIFICANT CHANGE UP (ref 3.8–10.5)

## 2024-01-14 PROCEDURE — 99232 SBSQ HOSP IP/OBS MODERATE 35: CPT

## 2024-01-14 RX ADMIN — SPIRONOLACTONE 25 MILLIGRAM(S): 25 TABLET, FILM COATED ORAL at 06:18

## 2024-01-14 RX ADMIN — PANTOPRAZOLE SODIUM 40 MILLIGRAM(S): 20 TABLET, DELAYED RELEASE ORAL at 06:18

## 2024-01-14 RX ADMIN — Medication 1 TABLET(S): at 12:47

## 2024-01-14 RX ADMIN — Medication 75 MICROGRAM(S): at 06:02

## 2024-01-14 RX ADMIN — CARVEDILOL PHOSPHATE 3.12 MILLIGRAM(S): 80 CAPSULE, EXTENDED RELEASE ORAL at 06:18

## 2024-01-14 RX ADMIN — CEFTRIAXONE 100 MILLIGRAM(S): 500 INJECTION, POWDER, FOR SOLUTION INTRAMUSCULAR; INTRAVENOUS at 20:24

## 2024-01-14 RX ADMIN — Medication 1 MILLIGRAM(S): at 12:47

## 2024-01-14 RX ADMIN — RIVAROXABAN 20 MILLIGRAM(S): KIT at 17:28

## 2024-01-14 NOTE — PROGRESS NOTE ADULT - ASSESSMENT
89 y/o F with a h/o HTN, chronic afib (s/p ppm), hypothyroidism, dementia (AO*1-2 conversant), newly diagnosed severe pulm HTN presenting with a UTI.  87 y/o F with a h/o HTN, chronic afib (s/p ppm), hypothyroidism, dementia (AO*1-2 conversant), newly diagnosed severe pulm HTN presenting with a UTI.

## 2024-01-14 NOTE — PROGRESS NOTE ADULT - SUBJECTIVE AND OBJECTIVE BOX
[facility] Division of Hospital Medicine  Pineda Oakley  MS Teams      SUBJECTIVE / OVERNIGHT EVENTS:  No events ovenright  eating breakfast without difficulty  denies cp/sob    ADDITIONAL REVIEW OF SYSTEMS:    MEDICATIONS  (STANDING):  carvedilol 3.125 milliGRAM(s) Oral every 12 hours  cefTRIAXone   IVPB 1000 milliGRAM(s) IV Intermittent every 24 hours  folic acid 1 milliGRAM(s) Oral daily  levothyroxine 75 MICROGram(s) Oral daily  multivitamin 1 Tablet(s) Oral daily  pantoprazole    Tablet 40 milliGRAM(s) Oral before breakfast  rivaroxaban 20 milliGRAM(s) Oral with dinner  spironolactone 25 milliGRAM(s) Oral daily    MEDICATIONS  (PRN):      I&O's Summary    13 Jan 2024 07:01  -  14 Jan 2024 07:00  --------------------------------------------------------  IN: 50 mL / OUT: 350 mL / NET: -300 mL        PHYSICAL EXAM:  Vital Signs Last 24 Hrs  T(C): 36.7 (14 Jan 2024 11:00), Max: 36.7 (14 Jan 2024 11:00)  T(F): 98 (14 Jan 2024 11:00), Max: 98 (14 Jan 2024 11:00)  HR: 76 (14 Jan 2024 11:00) (76 - 84)  BP: 109/66 (14 Jan 2024 11:00) (91/63 - 118/79)  BP(mean): --  RR: 18 (14 Jan 2024 11:00) (18 - 18)  SpO2: 96% (14 Jan 2024 11:00) (94% - 97%)    Parameters below as of 14 Jan 2024 11:00  Patient On (Oxygen Delivery Method): nasal cannula  O2 Flow (L/min): 2    CONSTITUTIONAL: NAD, well-developed  RESPIRATORY: Normal respiratory effort; lungs are clear to auscultation bilaterally  CARDIOVASCULAR: Regular rate and rhythm, normal S1 and S2, no murmur: No lower extremity edema  ABDOMEN: Nontender to palpation, normoactive bowel sounds, no rebound/guarding  MUSCULOSKELETAL: no clubbing or cyanosis of digits; no joint swelling or tenderness to palpation  PSYCH: A+O to person, place; affect appropriate  NEUROLOGY: CN 2-12 are intact and symmetric; no gross sensory deficits   SKIN: No rashes; no palpable lesions    LABS:                        13.1   9.36  )-----------( 253      ( 14 Jan 2024 07:04 )             41.9     01-14    140  |  105  |  27<H>  ----------------------------<  117<H>  4.5   |  28  |  0.52    Ca    9.4      14 Jan 2024 07:06    TPro  6.1  /  Alb  3.1<L>  /  TBili  0.5  /  DBili  x   /  AST  12  /  ALT  12  /  AlkPhos  70  01-13          Urinalysis Basic - ( 14 Jan 2024 07:06 )    Color: x / Appearance: x / SG: x / pH: x  Gluc: 117 mg/dL / Ketone: x  / Bili: x / Urobili: x   Blood: x / Protein: x / Nitrite: x   Leuk Esterase: x / RBC: x / WBC x   Sq Epi: x / Non Sq Epi: x / Bacteria: x     [facility] Division of Hospital Medicine  Pineda Oakley  MS Teams      SUBJECTIVE / OVERNIGHT EVENTS:  No events ovenright  eating breakfast without difficulty  denies cp/sob    ADDITIONAL REVIEW OF SYSTEMS:    MEDICATIONS  (STANDING):  carvedilol 3.125 milliGRAM(s) Oral every 12 hours  cefTRIAXone   IVPB 1000 milliGRAM(s) IV Intermittent every 24 hours  folic acid 1 milliGRAM(s) Oral daily  levothyroxine 75 MICROGram(s) Oral daily  multivitamin 1 Tablet(s) Oral daily  pantoprazole    Tablet 40 milliGRAM(s) Oral before breakfast  rivaroxaban 20 milliGRAM(s) Oral with dinner  spironolactone 25 milliGRAM(s) Oral daily    MEDICATIONS  (PRN):      I&O's Summary    13 Jan 2024 07:01  -  14 Jan 2024 07:00  --------------------------------------------------------  IN: 50 mL / OUT: 350 mL / NET: -300 mL        PHYSICAL EXAM:  Vital Signs Last 24 Hrs  T(C): 36.7 (14 Jan 2024 11:00), Max: 36.7 (14 Jan 2024 11:00)  T(F): 98 (14 Jan 2024 11:00), Max: 98 (14 Jan 2024 11:00)  HR: 76 (14 Jan 2024 11:00) (76 - 84)  BP: 109/66 (14 Jan 2024 11:00) (91/63 - 118/79)  BP(mean): --  RR: 18 (14 Jan 2024 11:00) (18 - 18)  SpO2: 96% (14 Jan 2024 11:00) (94% - 97%)    Parameters below as of 14 Jan 2024 11:00  Patient On (Oxygen Delivery Method): nasal cannula  O2 Flow (L/min): 2    CONSTITUTIONAL: NAD, well-developed  RESPIRATORY: Normal respiratory effort; lungs are clear to auscultation bilaterally  CARDIOVASCULAR: Regular rate and rhythm, normal S1 and S2, no murmur: No lower extremity edema  ABDOMEN: Nontender to palpation, normoactive bowel sounds, no rebound/guarding  MUSCULOSKELETAL: no clubbing or cyanosis of digits; no joint swelling or tenderness to palpation  PSYCH: A+O to person, place; affect appropriate  NEUROLOGY: CN 2-12 are intact and symmetric; no gross sensory deficits   SKIN: No rashes; no palpable lesions    LABS:                        13.1   9.36  )-----------( 253      ( 14 Jan 2024 07:04 )             41.9     01-14    140  |  105  |  27<H>  ----------------------------<  117<H>  4.5   |  28  |  0.52    Ca    9.4      14 Jan 2024 07:06    TPro  6.1  /  Alb  3.1<L>  /  TBili  0.5  /  DBili  x   /  AST  12  /  ALT  12  /  AlkPhos  70  01-13          Urinalysis Basic - ( 14 Jan 2024 07:06 )    Color: x / Appearance: x / SG: x / pH: x  Gluc: 117 mg/dL / Ketone: x  / Bili: x / Urobili: x   Blood: x / Protein: x / Nitrite: x   Leuk Esterase: x / RBC: x / WBC x   Sq Epi: x / Non Sq Epi: x / Bacteria: x

## 2024-01-15 PROCEDURE — 99232 SBSQ HOSP IP/OBS MODERATE 35: CPT

## 2024-01-15 RX ADMIN — RIVAROXABAN 20 MILLIGRAM(S): KIT at 17:16

## 2024-01-15 RX ADMIN — PANTOPRAZOLE SODIUM 40 MILLIGRAM(S): 20 TABLET, DELAYED RELEASE ORAL at 06:05

## 2024-01-15 RX ADMIN — Medication 75 MICROGRAM(S): at 06:05

## 2024-01-15 RX ADMIN — CARVEDILOL PHOSPHATE 3.12 MILLIGRAM(S): 80 CAPSULE, EXTENDED RELEASE ORAL at 17:16

## 2024-01-15 RX ADMIN — SPIRONOLACTONE 25 MILLIGRAM(S): 25 TABLET, FILM COATED ORAL at 06:05

## 2024-01-15 RX ADMIN — CARVEDILOL PHOSPHATE 3.12 MILLIGRAM(S): 80 CAPSULE, EXTENDED RELEASE ORAL at 06:05

## 2024-01-15 RX ADMIN — Medication 1 MILLIGRAM(S): at 12:53

## 2024-01-15 RX ADMIN — CEFTRIAXONE 100 MILLIGRAM(S): 500 INJECTION, POWDER, FOR SOLUTION INTRAMUSCULAR; INTRAVENOUS at 20:49

## 2024-01-15 RX ADMIN — Medication 1 TABLET(S): at 12:52

## 2024-01-15 NOTE — PROGRESS NOTE ADULT - TIME BILLING
reviewing emr, labs, coordination of care, discussion with patient, documentation. Attempted to call son, no response and mailbox full.
reviewing emr, labs, coordination of care, discussion with patient family, documentation
reviewing emr, labs, coordination of care, discussion with patient, documentation

## 2024-01-16 ENCOUNTER — TRANSCRIPTION ENCOUNTER (OUTPATIENT)
Age: 89
End: 2024-01-16

## 2024-01-16 LAB
-  AMOXICILLIN/CLAVULANIC ACID: SIGNIFICANT CHANGE UP
-  AMPICILLIN/SULBACTAM: SIGNIFICANT CHANGE UP
-  AMPICILLIN: SIGNIFICANT CHANGE UP
-  AZTREONAM: SIGNIFICANT CHANGE UP
-  CEFAZOLIN: SIGNIFICANT CHANGE UP
-  CEFEPIME: SIGNIFICANT CHANGE UP
-  CEFOXITIN: SIGNIFICANT CHANGE UP
-  CEFTRIAXONE: SIGNIFICANT CHANGE UP
-  CEFUROXIME: SIGNIFICANT CHANGE UP
-  CIPROFLOXACIN: SIGNIFICANT CHANGE UP
-  ERTAPENEM: SIGNIFICANT CHANGE UP
-  GENTAMICIN: SIGNIFICANT CHANGE UP
-  LEVOFLOXACIN: SIGNIFICANT CHANGE UP
-  MEROPENEM: SIGNIFICANT CHANGE UP
-  NITROFURANTOIN: SIGNIFICANT CHANGE UP
-  PIPERACILLIN/TAZOBACTAM: SIGNIFICANT CHANGE UP
-  TOBRAMYCIN: SIGNIFICANT CHANGE UP
-  TRIMETHOPRIM/SULFAMETHOXAZOLE: SIGNIFICANT CHANGE UP
METHOD TYPE: SIGNIFICANT CHANGE UP
SARS-COV-2 RNA SPEC QL NAA+PROBE: SIGNIFICANT CHANGE UP

## 2024-01-16 PROCEDURE — 99232 SBSQ HOSP IP/OBS MODERATE 35: CPT

## 2024-01-16 RX ORDER — CARVEDILOL PHOSPHATE 80 MG/1
1 CAPSULE, EXTENDED RELEASE ORAL
Qty: 0 | Refills: 0 | DISCHARGE

## 2024-01-16 RX ORDER — CARVEDILOL PHOSPHATE 80 MG/1
1 CAPSULE, EXTENDED RELEASE ORAL
Qty: 0 | Refills: 0 | DISCHARGE
Start: 2024-01-16

## 2024-01-16 RX ORDER — RIVAROXABAN 15 MG-20MG
1 KIT ORAL
Qty: 0 | Refills: 0 | DISCHARGE
Start: 2024-01-16

## 2024-01-16 RX ORDER — RIVAROXABAN 15 MG-20MG
1 KIT ORAL
Refills: 0 | DISCHARGE

## 2024-01-16 RX ORDER — SPIRONOLACTONE 25 MG/1
1 TABLET, FILM COATED ORAL
Refills: 0 | DISCHARGE

## 2024-01-16 RX ORDER — SPIRONOLACTONE 25 MG/1
1 TABLET, FILM COATED ORAL
Qty: 0 | Refills: 0 | DISCHARGE
Start: 2024-01-16

## 2024-01-16 RX ADMIN — RIVAROXABAN 20 MILLIGRAM(S): KIT at 18:19

## 2024-01-16 RX ADMIN — PANTOPRAZOLE SODIUM 40 MILLIGRAM(S): 20 TABLET, DELAYED RELEASE ORAL at 04:56

## 2024-01-16 RX ADMIN — Medication 1 TABLET(S): at 11:36

## 2024-01-16 RX ADMIN — Medication 1 MILLIGRAM(S): at 11:36

## 2024-01-16 RX ADMIN — CARVEDILOL PHOSPHATE 3.12 MILLIGRAM(S): 80 CAPSULE, EXTENDED RELEASE ORAL at 04:57

## 2024-01-16 RX ADMIN — CARVEDILOL PHOSPHATE 3.12 MILLIGRAM(S): 80 CAPSULE, EXTENDED RELEASE ORAL at 18:20

## 2024-01-16 RX ADMIN — SPIRONOLACTONE 25 MILLIGRAM(S): 25 TABLET, FILM COATED ORAL at 04:56

## 2024-01-16 RX ADMIN — Medication 75 MICROGRAM(S): at 04:56

## 2024-01-16 NOTE — PROGRESS NOTE ADULT - PROBLEM SELECTOR PLAN 6
-carvedilol 3.125mg q12 with hold parameters  -spironolactone 25mg daily with hold parameters

## 2024-01-16 NOTE — DISCHARGE NOTE PROVIDER - NSDCMRMEDTOKEN_GEN_ALL_CORE_FT
ascorbic acid 500 mg oral tablet: 2 tab(s) orally once a day  carvedilol 3.125 mg oral tablet: 1 tab(s) orally every 12 hours  cholecalciferol 125 mcg (5000 intl units) oral tablet: 1 tab(s) orally 2 times a day  folic acid 1 mg oral tablet: 1 tab(s) orally once a day  guaiFENesin 600 mg oral tablet, extended release: 1 tab(s) orally 2 times a day as needed  levothyroxine 75 mcg (0.075 mg) oral tablet: 1 tab(s) orally once a day  Multiple Vitamins oral tablet: 1 tab(s) orally once a day  pantoprazole 40 mg oral delayed release tablet: 1 tab(s) orally once a day (before a meal)  rivaroxaban 20 mg oral tablet: 1 tab(s) orally once a day (before a meal)  spironolactone 25 mg oral tablet: 1 tab(s) orally once a day

## 2024-01-16 NOTE — PROGRESS NOTE ADULT - PROBLEM SELECTOR PLAN 2
severe newly diagnosed on last admission via CT and TTE. Also with unspecified lung dz. Currently using 1.5L NC at home  -continue spironolactone  -O2prn  -Planned for RHC for further elucidation at some pt, previously deferred to outpatient
severe newly diagnosed on last admission via CT and TTE. Also with unspecified lung dz. Currently using 1.5L NC at home  -continue spironolactone  -O2prn  -Discussed at length with son Tian Martinez, patient has followed with cardiologist Dr. Tim Hernandez long term, and plan to discuss RHC with Dr. Hernandez outpatient.
severe newly diagnosed on last admission via CT and TTE. Also with unspecified lung dz. Currently using 1.5L NC at home  -continue spironolactone  -O2prn  -Planned for RHC for further elucidation at some pt, previously deferred to outpatient
severe newly diagnosed on last admission via CT and TTE. Also with unspecified lung dz. Currently using 1.5L NC at home  -continue spironolactone  -O2prn  -Planned for RHC for further elucidation at some pt, previously deferred to outpatient

## 2024-01-16 NOTE — DISCHARGE NOTE PROVIDER - NSDCFUADDAPPT_GEN_ALL_CORE_FT
APPTS ARE READY TO BE MADE: [ x] YES    Best Family or Patient Contact (if needed):    Additional Information about above appointments (if needed):    1:   2:   3:     Other comments or requests:    APPTS ARE READY TO BE MADE: [ x] YES    Best Family or Patient Contact (if needed):    Additional Information about above appointments (if needed):    1:   2:   3:     Other comments or requests:       Patient is being discharged to rehab/hospice. Caregiver will arrange follow up.

## 2024-01-16 NOTE — DISCHARGE NOTE PROVIDER - ATTENDING DISCHARGE PHYSICAL EXAMINATION:
Vital Signs Last 24 Hrs  T(C): 36.7 (17 Jan 2024 11:42), Max: 36.7 (17 Jan 2024 05:27)  T(F): 98.1 (17 Jan 2024 11:42), Max: 98.1 (17 Jan 2024 05:27)  HR: 71 (17 Jan 2024 11:42) (71 - 90)  BP: 115/80 (17 Jan 2024 11:42) (98/67 - 128/86)  BP(mean): --  RR: 18 (17 Jan 2024 11:42) (18 - 18)  SpO2: 95% (17 Jan 2024 11:42) (94% - 98%)    Parameters below as of 17 Jan 2024 11:42  Patient On (Oxygen Delivery Method): room air        CONSTITUTIONAL: NAD, well-developed, well-groomed  EYES: Conjunctiva and sclera clear  ENMT: Moist oral mucosa  NECK: Supple  RESPIRATORY: Normal respiratory effort; lungs are clear to auscultation bilaterally  CARDIOVASCULAR: Regular rate and rhythm, normal S1 and S2  ABDOMEN: Nontender to palpation, normoactive bowel sounds  SKIN: No rashes

## 2024-01-16 NOTE — PROGRESS NOTE ADULT - PROBLEM SELECTOR PLAN 1
History of recurrent UTIs (1/month)  -completing CTX today  -Ucx with 100K>GNR  -will consider Methenamine hippurate once completed course  -concern deconditioning increasing risk of UTIs as well->PT recommending DIXIE
History of recurrent UTIs (1/month)  -CTX  -f/u Ucx  -will consider Methenamine hippurate once completed course  -concern deconditioning increasing risk of UTIs as well->PT recommending DIXIE
History of recurrent UTIs (1/month)  -CTX  -f/u Ucx  -will consider Methenamine hippurate once completed course  -concern deconditioning increasing risk of UTIs as well->PT recommending DIXIE
History of recurrent UTIs (1/month)  -s/p ceftriaxone (1/12--1/15)  -Ucx Proteus mirabilis, sensitive to ceftriaxone   -concern deconditioning increasing risk of UTIs as well->PT recommending DIXIE

## 2024-01-16 NOTE — PROGRESS NOTE ADULT - PROBLEM SELECTOR PLAN 3
s/p ppm  -continue rivaroxaban

## 2024-01-16 NOTE — DISCHARGE NOTE PROVIDER - NSDCCPCAREPLAN_GEN_ALL_CORE_FT
PRINCIPAL DISCHARGE DIAGNOSIS  Diagnosis: Acute UTI  Assessment and Plan of Treatment: You were found to have a UTI , urine culture growing proteus mirabilis, sensitive to ceftriaxone. You were treated with IV antibiotics.   HOME CARE INSTRUCTIONS  f you were prescribed antibiotics, take them exactly as your caregiver instructs you. Finish the medication even if you feel better after you have only taken some of the medication.  Drink enough water and fluids to keep your urine clear or pale yellow.  Avoid caffeine, tea, and carbonated beverages. They tend to irritate your bladder.  Empty your bladder often. Avoid holding urine for long periods of time.  Empty your bladder before and after sexual intercourse.  After a bowel movement, women should cleanse from front to back. Use each tissue only once.  SEEK MEDICAL CARE IF:  You have back pain.  You develop a fever.  Your symptoms do not begin to resolve within 3 days.  SEEK IMMEDIATE MEDICAL CARE IF:  You have severe back pain or lower abdominal pain.  You develop chills.  You have nausea or vomiting.  You have continued burning or discomfort with urination.        SECONDARY DISCHARGE DIAGNOSES  Diagnosis: Pulmonary hypertension  Assessment and Plan of Treatment: Severe newly diagnosed on last admission via CT and TTE. Also with unspecified lung dz. Currently using 1.5L NC at home  -continue spironolactone  -O2prn  -Discussed at length with son Tian Martinez, patient has followed with cardiologist Dr. Tim Hernandez long term, and plan to discuss RHC with Dr. Hernandez outpatient.      Diagnosis: Pulmonary nodule  Assessment and Plan of Treatment: recent CT with mosaic attenuation of lung parenchyma and b/l pulm nodules  -repeat CT in 6-8weeks       PRINCIPAL DISCHARGE DIAGNOSIS  Diagnosis: Acute UTI  Assessment and Plan of Treatment: You were found to have a UTI , urine culture growing proteus mirabilis, sensitive to ceftriaxone. You were treated with IV antibiotics.   HOME CARE INSTRUCTIONS  f you were prescribed antibiotics, take them exactly as your caregiver instructs you. Finish the medication even if you feel better after you have only taken some of the medication.  Drink enough water and fluids to keep your urine clear or pale yellow.  Avoid caffeine, tea, and carbonated beverages. They tend to irritate your bladder.  Empty your bladder often. Avoid holding urine for long periods of time.  Empty your bladder before and after sexual intercourse.  After a bowel movement, women should cleanse from front to back. Use each tissue only once.  SEEK MEDICAL CARE IF:  You have back pain.  You develop a fever.  Your symptoms do not begin to resolve within 3 days.  SEEK IMMEDIATE MEDICAL CARE IF:  You have severe back pain or lower abdominal pain.  You develop chills.  You have nausea or vomiting.  You have continued burning or discomfort with urination.        SECONDARY DISCHARGE DIAGNOSES  Diagnosis: Pulmonary nodule  Assessment and Plan of Treatment: recent CT with mosaic attenuation of lung parenchyma and b/l pulm nodules  -repeat CT in 6-8weeks      Diagnosis: Pulmonary hypertension  Assessment and Plan of Treatment: Severe newly diagnosed on last admission via CT and TTE. Also with unspecified lung dz. Currently using 1.5L NC at home  -continue spironolactone  -O2prn  -Discussed at length with son Tian Martinez, patient has followed with cardiologist Dr. Tim Hernandez long term, and plan to discuss RHC with Dr. Hernandez outpatient.

## 2024-01-16 NOTE — DISCHARGE NOTE PROVIDER - CARE PROVIDER_API CALL
Tim Cee  Cardiology  [address] [place] [address], [address]  [place] [address]  Phone: [phone]  Fax: [phone]  Follow Up Time:    Tim Cee  Cardiology  [address] [place] [address], [address]  [place] [address]  Phone: [phone]  Fax: [phone]  Follow Up Time:

## 2024-01-16 NOTE — DISCHARGE NOTE PROVIDER - HOSPITAL COURSE
ay    HPI: 87 y/o F with a h/o HTN, chronic afib (s/p ppm), hypothyroidism, dementia (AO*1-2 conversant), newly diagnosed severe pulm HTN and unspecified lung disease p/w urinary bleeding for 1 day. Patient was recently admitted for RSV requiring O2. s/p steroids with improvement. During work up, noted to have new severe pulm HTN (PASP71) and pulm nodules. Was recommended for RHC, deffered to outpatient, as well as repeat CT chest in 6-8weeks. Was d/raman home on po prednisone.  At home, although was not d/raman with O2, was using 1.5L NC. Since discharge noted to be weak, unable to get out of bed (previously able to walk minimal amounts). Family and aides having difficult time managing adls. Day prior to admission, endorsing new dysuria. In diaper noted to have bleeding. No melena/hematemesis. Denies f/chills/cough/sob. Has been taking home ac reliably. For this reason presented to the ED.    In the ED afeb hds, O2 95% on RA and 2L. Labs notable for WBC 13, hgb wnl, UA grossly +. Received CTX (12 Jan 2024 17:37)    Hospital Course: Pt presenting with UTI. PT with history of recurrent UTIs (1/month), Ucx Proteus mirabilis, sensitive to ceftriaxone, s/p ceftriaxone (1/12--1/15). Pt noted also with severe newly diagnosed on last admission via CT and TTE. Also with unspecified lung dz. Currently using 1.5L NC at home, will continue spironolactone. Patient has followed with cardiologist Dr. Tim Hernandez long term, and plan to discuss RHC with Dr. Hernandez outpatient. Due to  concern of  deconditioning,  increasing risk of UTIs as well->PT recommending DIXIE. Pt is now medically stable for discharge to __________****        Important Medication Changes and Reason:    Active or Pending Issues Requiring Follow-up:    Advanced Directives:   [x ] Full code  [ ] DNR  [ ] Hospice    Discharge Diagnoses:  UTI  Pulmonary Hypertension          ay    HPI: 89 y/o F with a h/o HTN, chronic afib (s/p ppm), hypothyroidism, dementia (AO*1-2 conversant), newly diagnosed severe pulm HTN and unspecified lung disease p/w urinary bleeding for 1 day. Patient was recently admitted for RSV requiring O2. s/p steroids with improvement. During work up, noted to have new severe pulm HTN (PASP71) and pulm nodules. Was recommended for RHC, deffered to outpatient, as well as repeat CT chest in 6-8weeks. Was d/raman home on po prednisone.  At home, although was not d/raman with O2, was using 1.5L NC. Since discharge noted to be weak, unable to get out of bed (previously able to walk minimal amounts). Family and aides having difficult time managing adls. Day prior to admission, endorsing new dysuria. In diaper noted to have bleeding. No melena/hematemesis. Denies f/chills/cough/sob. Has been taking home ac reliably. For this reason presented to the ED.    In the ED afeb hds, O2 95% on RA and 2L. Labs notable for WBC 13, hgb wnl, UA grossly +. Received CTX (12 Jan 2024 17:37)    Hospital Course: Pt presenting with UTI. PT with history of recurrent UTIs (1/month), Ucx Proteus mirabilis, sensitive to ceftriaxone, s/p ceftriaxone (1/12--1/15). Pt noted also with severe newly diagnosed  pulm HTN,on last admission via CT and TTE. Also with unspecified lung dz. Currently using 1.5L NC at home, will continue spironolactone. Patient has followed with cardiologist Dr. Tim Hernandez long term, and plan to discuss RHC with Dr. Hernandez outpatient. Due to  concern of  deconditioning,  increasing risk of UTIs as well->PT recommending DIXIE. Pt is now medically stable for discharge to Rehab. Medication reconciliation done by Dr Lomax.        Important Medication Changes and Reason:    Active or Pending Issues Requiring Follow-up:  Cardiology  Advanced Directives:   [x ] Full code  [ ] DNR  [ ] Hospice    Discharge Diagnoses:  UTI  Pulmonary Hypertension          ay    HPI: 89 y/o F with a h/o HTN, chronic afib (s/p ppm), hypothyroidism, dementia (AO*1-2 conversant), newly diagnosed severe pulm HTN and unspecified lung disease p/w urinary bleeding for 1 day. Patient was recently admitted for RSV requiring O2. s/p steroids with improvement. During work up, noted to have new severe pulm HTN (PASP71) and pulm nodules. Was recommended for RHC, deffered to outpatient, as well as repeat CT chest in 6-8weeks. Was d/raman home on po prednisone.  At home, although was not d/raman with O2, was using 1.5L NC. Since discharge noted to be weak, unable to get out of bed (previously able to walk minimal amounts). Family and aides having difficult time managing adls. Day prior to admission, endorsing new dysuria. In diaper noted to have bleeding. No melena/hematemesis. Denies f/chills/cough/sob. Has been taking home ac reliably. For this reason presented to the ED.    In the ED afeb hds, O2 95% on RA and 2L. Labs notable for WBC 13, hgb wnl, UA grossly +. Received CTX (12 Jan 2024 17:37)    Hospital Course: Pt presenting with UTI. PT with history of recurrent UTIs (1/month), Ucx Proteus mirabilis, sensitive to ceftriaxone, s/p ceftriaxone (1/12--1/15). Pt noted also with severe newly diagnosed  pulm HTN,on last admission via CT and TTE. Also with unspecified lung dz. Currently using 1.5L NC at home, will continue spironolactone. Patient has followed with cardiologist Dr. Tim Hernandez long term, and plan to discuss RHC with Dr. Hernandez outpatient. Due to  concern of  deconditioning,  increasing risk of UTIs as well->PT recommending DIXIE. Pt is now medically stable for discharge to Rehab. Medication reconciliation done by Dr Lomax.        Important Medication Changes and Reason:  None    Active or Pending Issues Requiring Follow-up:  Cardiology  Advanced Directives:   [x ] Full code  [ ] DNR  [ ] Hospice    Discharge Diagnoses:  UTI  Pulmonary Hypertension          ay    HPI: 89 y/o F with a h/o HTN, chronic afib (s/p ppm), hypothyroidism, dementia (AO*1-2 conversant), newly diagnosed severe pulm HTN and unspecified lung disease p/w urinary bleeding for 1 day. Patient was recently admitted for RSV requiring O2. s/p steroids with improvement. During work up, noted to have new severe pulm HTN (PASP71) and pulm nodules. Was recommended for RHC, deffered to outpatient, as well as repeat CT chest in 6-8weeks. Was d/raman home on po prednisone.  At home, although was not d/raman with O2, was using 1.5L NC. Since discharge noted to be weak, unable to get out of bed (previously able to walk minimal amounts). Family and aides having difficult time managing adls. Day prior to admission, endorsing new dysuria. In diaper noted to have bleeding. No melena/hematemesis. Denies f/chills/cough/sob. Has been taking home ac reliably. For this reason presented to the ED.    In the ED afeb hds, O2 95% on RA and 2L. Labs notable for WBC 13, hgb wnl, UA grossly +. Received CTX (12 Jan 2024 17:37)    Hospital Course: Pt presenting with UTI. PT with history of recurrent UTIs (1/month), Ucx Proteus mirabilis, sensitive to ceftriaxone, s/p ceftriaxone (1/12--1/15). Pt noted also with severe newly diagnosed  pulm HTN,on last admission via CT and TTE. Also with unspecified lung dz. Currently using 1.5L NC at home, will continue spironolactone. Patient has followed with cardiologist Dr. Tim Hernandez long term, and plan to discuss RHC with Dr. Hernandez outpatient. Due to  concern of  deconditioning,  increasing risk of UTIs as well->PT recommending DIXIE. Pt is now medically stable for discharge to Rehab. Medication reconciliation done by Dr Lomax.        Important Medication Changes and Reason:  None    Active or Pending Issues Requiring Follow-up:  Cardiology    Advanced Directives:   [x ] Full code  [ ] DNR  [ ] Hospice    Discharge Diagnoses:  UTI  Pulmonary Hypertension          ay    HPI: 87 y/o F with a h/o HTN, chronic afib (s/p ppm), hypothyroidism, dementia (AO*1-2 conversant), newly diagnosed severe pulm HTN and unspecified lung disease p/w urinary bleeding for 1 day. Patient was recently admitted for RSV requiring O2. s/p steroids with improvement. During work up, noted to have new severe pulm HTN (PASP71) and pulm nodules. Was recommended for RHC, deffered to outpatient, as well as repeat CT chest in 6-8weeks. Was d/raman home on po prednisone.  At home, although was not d/raman with O2, was using 1.5L NC. Since discharge noted to be weak, unable to get out of bed (previously able to walk minimal amounts). Family and aides having difficult time managing adls. Day prior to admission, endorsing new dysuria. In diaper noted to have bleeding. No melena/hematemesis. Denies f/chills/cough/sob. Has been taking home ac reliably. For this reason presented to the ED.    In the ED afeb hds, O2 95% on RA and 2L. Labs notable for WBC 13, hgb wnl, UA grossly +. Received CTX (12 Jan 2024 17:37)    Hospital Course: Pt presenting with UTI. PT with history of recurrent UTIs (1/month), Ucx Proteus mirabilis, sensitive to ceftriaxone, s/p ceftriaxone (1/12--1/15). Pt noted also with severe newly diagnosed  pulm HTN,on last admission via CT and TTE. Also with unspecified lung dz. Currently using 1.5L NC at home, will continue spironolactone. Patient has followed with cardiologist Dr. Tim Hernandez long term, and plan to discuss RHC with Dr. Hernandez outpatient. Due to  concern of  deconditioning,  increasing risk of UTIs as well->PT recommending DIXIE. Pt is now medically stable for discharge to Rehab. Medication reconciliation done by Dr Lomax.    Discussed discharge with son Tian and answered all questions to satisfaction.         Important Medication Changes and Reason:  None    Active or Pending Issues Requiring Follow-up:  Cardiology  PCP    Advanced Directives:   [x ] Full code  [ ] DNR  [ ] Hospice    Discharge Diagnoses:  UTI  Pulmonary Hypertension          ay    HPI: 89 y/o F with a h/o HTN, chronic afib (s/p ppm), hypothyroidism, dementia (AO*1-2 conversant), newly diagnosed severe pulm HTN and unspecified lung disease p/w urinary bleeding for 1 day. Patient was recently admitted for RSV requiring O2. s/p steroids with improvement. During work up, noted to have new severe pulm HTN (PASP71) and pulm nodules. Was recommended for RHC, deffered to outpatient, as well as repeat CT chest in 6-8weeks. Was d/raman home on po prednisone.  At home, although was not d/raman with O2, was using 1.5L NC. Since discharge noted to be weak, unable to get out of bed (previously able to walk minimal amounts). Family and aides having difficult time managing adls. Day prior to admission, endorsing new dysuria. In diaper noted to have bleeding. No melena/hematemesis. Denies f/chills/cough/sob. Has been taking home ac reliably. For this reason presented to the ED.    In the ED afeb hds, O2 95% on RA and 2L. Labs notable for WBC 13, hgb wnl, UA grossly +. Received CTX (12 Jan 2024 17:37)    Hospital Course: Pt presenting with UTI. PT with history of recurrent UTIs (1/month), Ucx Proteus mirabilis, sensitive to ceftriaxone, s/p ceftriaxone (1/12--1/15). Pt noted also with severe newly diagnosed  pulm HTN,on last admission via CT and TTE. Also with unspecified lung dz. Currently using 1.5L NC at home, will continue spironolactone. Patient has followed with cardiologist Dr. Tim Hernandez long term, and plan to discuss RHC with Dr. Hernandez outpatient. Due to  concern of  deconditioning,  increasing risk of UTIs as well->PT recommending DIXIE. Pt is now medically stable for discharge to Rehab. Medication reconciliation done by Dr Lomax.    Discussed discharge with son Tian and answered all questions to satisfaction.         Important Medication Changes and Reason:  None    Active or Pending Issues Requiring Follow-up:  Cardiology  PCP    Advanced Directives:   [x ] Full code  [ ] DNR  [ ] Hospice    Discharge Diagnoses:  UTI  Pulmonary Hypertension

## 2024-01-16 NOTE — PROGRESS NOTE ADULT - PROBLEM SELECTOR PLAN 7
began GOC conversation on admission given recurrent presentations and severe pulm HTN as well. Son discussing with family over the weekend and will have further decisions this week    Dispo- medically cleared pending d/c DIXIE
began GOC conversation on admission given recurrent presentations and severe pulm HTN as well. Son discussing with family over the weekend and will have further decisions next week    Dispo-DIXIE
began GOC conversation on admission given recurrent presentations and severe pulm HTN as well. Son discussing with family over the weekend and will have further decisions next week    Dispo-DIXIE
began GOC conversation on admission given recurrent presentations and severe pulm HTN as well. Son discussing with family over the weekend and will have further decisions this week  s/p discussion with son this AM, still in discussion with family     Dispo- medically optimized pending d/c DIXIE

## 2024-01-17 ENCOUNTER — TRANSCRIPTION ENCOUNTER (OUTPATIENT)
Age: 89
End: 2024-01-17

## 2024-01-17 PROCEDURE — 87086 URINE CULTURE/COLONY COUNT: CPT

## 2024-01-17 PROCEDURE — 99285 EMERGENCY DEPT VISIT HI MDM: CPT | Mod: 25

## 2024-01-17 PROCEDURE — 87637 SARSCOV2&INF A&B&RSV AMP PRB: CPT

## 2024-01-17 PROCEDURE — 71045 X-RAY EXAM CHEST 1 VIEW: CPT

## 2024-01-17 PROCEDURE — 80048 BASIC METABOLIC PNL TOTAL CA: CPT

## 2024-01-17 PROCEDURE — 99239 HOSP IP/OBS DSCHRG MGMT >30: CPT

## 2024-01-17 PROCEDURE — 87635 SARS-COV-2 COVID-19 AMP PRB: CPT

## 2024-01-17 PROCEDURE — 87077 CULTURE AEROBIC IDENTIFY: CPT

## 2024-01-17 PROCEDURE — 81001 URINALYSIS AUTO W/SCOPE: CPT

## 2024-01-17 PROCEDURE — 85025 COMPLETE CBC W/AUTO DIFF WBC: CPT

## 2024-01-17 PROCEDURE — 97161 PT EVAL LOW COMPLEX 20 MIN: CPT

## 2024-01-17 PROCEDURE — 87186 SC STD MICRODIL/AGAR DIL: CPT

## 2024-01-17 PROCEDURE — 85027 COMPLETE CBC AUTOMATED: CPT

## 2024-01-17 PROCEDURE — 80053 COMPREHEN METABOLIC PANEL: CPT

## 2024-01-17 RX ADMIN — Medication 1 MILLIGRAM(S): at 12:12

## 2024-01-17 RX ADMIN — Medication 1 TABLET(S): at 12:12

## 2024-01-17 RX ADMIN — SPIRONOLACTONE 25 MILLIGRAM(S): 25 TABLET, FILM COATED ORAL at 05:26

## 2024-01-17 RX ADMIN — CARVEDILOL PHOSPHATE 3.12 MILLIGRAM(S): 80 CAPSULE, EXTENDED RELEASE ORAL at 05:26

## 2024-01-17 RX ADMIN — PANTOPRAZOLE SODIUM 40 MILLIGRAM(S): 20 TABLET, DELAYED RELEASE ORAL at 05:26

## 2024-01-17 RX ADMIN — Medication 75 MICROGRAM(S): at 05:26

## 2024-01-17 NOTE — DISCHARGE NOTE NURSING/CASE MANAGEMENT/SOCIAL WORK - NSDCFUADDAPPT_GEN_ALL_CORE_FT
APPTS ARE READY TO BE MADE: [ x] YES    Best Family or Patient Contact (if needed):    Additional Information about above appointments (if needed):    1:   2:   3:     Other comments or requests:       Patient is being discharged to rehab/hospice. Caregiver will arrange follow up.

## 2024-01-17 NOTE — DISCHARGE NOTE NURSING/CASE MANAGEMENT/SOCIAL WORK - PATIENT PORTAL LINK FT
You can access the FollowMyHealth Patient Portal offered by [facility] by registering at the following website: [email or web address]. By joining [email or web address]’s FollowMyHealth portal, you will also be able to view your health information using other applications (apps) compatible with our system.

## 2024-01-18 VITALS
TEMPERATURE: 98 F | HEART RATE: 70 BPM | SYSTOLIC BLOOD PRESSURE: 138 MMHG | OXYGEN SATURATION: 99 % | RESPIRATION RATE: 18 BRPM | DIASTOLIC BLOOD PRESSURE: 71 MMHG

## 2024-01-18 LAB
CULTURE RESULTS: ABNORMAL
ORGANISM # SPEC MICROSCOPIC CNT: ABNORMAL
ORGANISM # SPEC MICROSCOPIC CNT: ABNORMAL
SPECIMEN SOURCE: SIGNIFICANT CHANGE UP

## 2024-05-10 RX ORDER — SPIRONOLACTONE 25 MG/1
25 TABLET ORAL
Qty: 90 | Refills: 3 | Status: ACTIVE | COMMUNITY
Start: 2022-02-18 | End: 1900-01-01

## 2024-06-05 NOTE — H&P ADULT - TEMPERATURE IN FAHRENHEIT (DEGREES F)
[FreeTextEntry3] : I, Coleman Hargrove, acted solely as a scribe for Dr. Dank Cruz on 06/05/2024.  I personally performed the services described in the documentation, reviewed the documentation recorded by the scribe in my presence, and it accurately and completely records my words and actions. 
98.1

## 2024-06-18 PROBLEM — I27.20 PULMONARY HYPERTENSION, UNSPECIFIED: Chronic | Status: ACTIVE | Noted: 2024-01-12

## 2024-06-20 ENCOUNTER — APPOINTMENT (OUTPATIENT)
Dept: GERIATRICS | Facility: CLINIC | Age: 89
End: 2024-06-20

## 2024-06-25 ENCOUNTER — APPOINTMENT (OUTPATIENT)
Dept: GERIATRICS | Facility: CLINIC | Age: 89
End: 2024-06-25
Payer: MEDICARE

## 2024-06-25 DIAGNOSIS — R26.81 UNSTEADINESS ON FEET: ICD-10-CM

## 2024-06-25 DIAGNOSIS — R32 UNSPECIFIED URINARY INCONTINENCE: ICD-10-CM

## 2024-06-25 PROCEDURE — 99442: CPT | Mod: 93

## 2024-09-03 NOTE — PROGRESS NOTE ADULT - SUBJECTIVE AND OBJECTIVE BOX
Quality 226: Preventive Care And Screening: Tobacco Use: Screening And Cessation Intervention: Patient screened for tobacco use and is an ex/non-smoker Detail Level: Detailed Quality 130: Documentation Of Current Medications In The Medical Record: Current Medications Documented Quality 431: Preventive Care And Screening: Unhealthy Alcohol Use - Screening: Patient not identified as an unhealthy alcohol user when screened for unhealthy alcohol use using a systematic screening method Quality 358: Patient-Centered Surgical Risk Assessment And Communication: Documentation of patient-specific risk assessment with a risk calculator based on multi-institutional clinical data, the specific risk calculator used, and communication of risk assessment from risk calculator with the patient or family. PROGRESS NOTE:     Patient is a 88y old  Female who presents with a chief complaint of UTI (15 Ray 2024 14:08)      SUBJECTIVE / OVERNIGHT EVENTS:  No acute events overnight. Patient seen and evaluated at bedside. No fever/chills.  Denies SOB at rest, chest pain, palpitations, abdominal pain, nausea/vomiting    ADDITIONAL REVIEW OF SYSTEMS:    MEDICATIONS  (STANDING):  carvedilol 3.125 milliGRAM(s) Oral every 12 hours  folic acid 1 milliGRAM(s) Oral daily  levothyroxine 75 MICROGram(s) Oral daily  multivitamin 1 Tablet(s) Oral daily  pantoprazole    Tablet 40 milliGRAM(s) Oral before breakfast  rivaroxaban 20 milliGRAM(s) Oral with dinner  spironolactone 25 milliGRAM(s) Oral daily    MEDICATIONS  (PRN):      CAPILLARY BLOOD GLUCOSE        I&O's Summary    15 Ray 2024 07:01  -  16 Jan 2024 07:00  --------------------------------------------------------  IN: 0 mL / OUT: 1600 mL / NET: -1600 mL        PHYSICAL EXAM:  Vital Signs Last 24 Hrs  T(C): 36.8 (16 Jan 2024 11:31), Max: 36.9 (15 Ray 2024 20:30)  T(F): 98.2 (16 Jan 2024 11:31), Max: 98.4 (15 Ray 2024 20:30)  HR: 86 (16 Jan 2024 11:31) (81 - 93)  BP: 132/77 (16 Jan 2024 11:31) (108/69 - 132/77)  BP(mean): --  RR: 18 (16 Jan 2024 11:31) (18 - 18)  SpO2: 96% (16 Jan 2024 11:31) (94% - 99%)    Parameters below as of 16 Jan 2024 11:31  Patient On (Oxygen Delivery Method): room air        CONSTITUTIONAL: NAD, well-developed  RESPIRATORY: Normal respiratory effort; lungs are clear to auscultation bilaterally  CARDIOVASCULAR: Regular rate and rhythm, normal S1 and S2, no murmur/rub/gallop; No lower extremity edema; Peripheral pulses are 2+ bilaterally  ABDOMEN: Nontender to palpation, normoactive bowel sounds, no rebound/guarding; No hepatosplenomegaly  MUSCLOSKELETAL: no clubbing or cyanosis of digits; no joint swelling or tenderness to palpation  PSYCH: A+O to person, place, and time; affect appropriate    LABS:                      RADIOLOGY & ADDITIONAL TESTS:  Results Reviewed:   Imaging Personally Reviewed:  Electrocardiogram Personally Reviewed:    COORDINATION OF CARE:  Care Discussed with Consultants/Other Providers [Y/N]:  Prior or Outpatient Records Reviewed [Y/N]:

## 2024-09-23 ENCOUNTER — NON-APPOINTMENT (OUTPATIENT)
Age: 89
End: 2024-09-23

## 2024-09-23 ENCOUNTER — EMERGENCY (EMERGENCY)
Facility: HOSPITAL | Age: 89
LOS: 1 days | Discharge: HOME | End: 2024-09-23
Attending: EMERGENCY MEDICINE
Payer: MEDICARE

## 2024-09-23 VITALS
TEMPERATURE: 98 F | OXYGEN SATURATION: 99 % | RESPIRATION RATE: 17 BRPM | HEART RATE: 74 BPM | SYSTOLIC BLOOD PRESSURE: 141 MMHG | DIASTOLIC BLOOD PRESSURE: 90 MMHG

## 2024-09-23 VITALS
WEIGHT: 136.03 LBS | SYSTOLIC BLOOD PRESSURE: 151 MMHG | HEIGHT: 69 IN | TEMPERATURE: 98 F | RESPIRATION RATE: 18 BRPM | DIASTOLIC BLOOD PRESSURE: 92 MMHG | OXYGEN SATURATION: 98 % | HEART RATE: 90 BPM

## 2024-09-23 DIAGNOSIS — R04.0 EPISTAXIS: ICD-10-CM

## 2024-09-23 LAB
ALBUMIN SERPL ELPH-MCNC: 3.8 G/DL — SIGNIFICANT CHANGE UP (ref 3.3–5)
ALP SERPL-CCNC: 103 U/L — SIGNIFICANT CHANGE UP (ref 40–120)
ALT FLD-CCNC: 15 U/L — SIGNIFICANT CHANGE UP (ref 10–45)
ANION GAP SERPL CALC-SCNC: 10 MMOL/L — SIGNIFICANT CHANGE UP (ref 5–17)
APPEARANCE UR: CLEAR — SIGNIFICANT CHANGE UP
AST SERPL-CCNC: 13 U/L — SIGNIFICANT CHANGE UP (ref 10–40)
BACTERIA # UR AUTO: NEGATIVE /HPF — SIGNIFICANT CHANGE UP
BASOPHILS # BLD AUTO: 0.04 K/UL — SIGNIFICANT CHANGE UP (ref 0–0.2)
BASOPHILS NFR BLD AUTO: 0.6 % — SIGNIFICANT CHANGE UP (ref 0–2)
BILIRUB SERPL-MCNC: 0.4 MG/DL — SIGNIFICANT CHANGE UP (ref 0.2–1.2)
BILIRUB UR-MCNC: NEGATIVE — SIGNIFICANT CHANGE UP
BUN SERPL-MCNC: 19 MG/DL — SIGNIFICANT CHANGE UP (ref 7–23)
CALCIUM SERPL-MCNC: 10.2 MG/DL — SIGNIFICANT CHANGE UP (ref 8.4–10.5)
CAST: 0 /LPF — SIGNIFICANT CHANGE UP (ref 0–4)
CHLORIDE SERPL-SCNC: 105 MMOL/L — SIGNIFICANT CHANGE UP (ref 96–108)
CO2 SERPL-SCNC: 25 MMOL/L — SIGNIFICANT CHANGE UP (ref 22–31)
COLOR SPEC: YELLOW — SIGNIFICANT CHANGE UP
CREAT SERPL-MCNC: 0.51 MG/DL — SIGNIFICANT CHANGE UP (ref 0.5–1.3)
DIFF PNL FLD: NEGATIVE — SIGNIFICANT CHANGE UP
EGFR: 90 ML/MIN/1.73M2 — SIGNIFICANT CHANGE UP
EOSINOPHIL # BLD AUTO: 0.03 K/UL — SIGNIFICANT CHANGE UP (ref 0–0.5)
EOSINOPHIL NFR BLD AUTO: 0.4 % — SIGNIFICANT CHANGE UP (ref 0–6)
GLUCOSE SERPL-MCNC: 105 MG/DL — HIGH (ref 70–99)
GLUCOSE UR QL: NEGATIVE MG/DL — SIGNIFICANT CHANGE UP
HCT VFR BLD CALC: 40.6 % — SIGNIFICANT CHANGE UP (ref 34.5–45)
HGB BLD-MCNC: 13 G/DL — SIGNIFICANT CHANGE UP (ref 11.5–15.5)
IMM GRANULOCYTES NFR BLD AUTO: 1.3 % — HIGH (ref 0–0.9)
KETONES UR-MCNC: NEGATIVE MG/DL — SIGNIFICANT CHANGE UP
LEUKOCYTE ESTERASE UR-ACNC: NEGATIVE — SIGNIFICANT CHANGE UP
LYMPHOCYTES # BLD AUTO: 1.52 K/UL — SIGNIFICANT CHANGE UP (ref 1–3.3)
LYMPHOCYTES # BLD AUTO: 21.3 % — SIGNIFICANT CHANGE UP (ref 13–44)
MCHC RBC-ENTMCNC: 30 PG — SIGNIFICANT CHANGE UP (ref 27–34)
MCHC RBC-ENTMCNC: 32 GM/DL — SIGNIFICANT CHANGE UP (ref 32–36)
MCV RBC AUTO: 93.5 FL — SIGNIFICANT CHANGE UP (ref 80–100)
MONOCYTES # BLD AUTO: 0.31 K/UL — SIGNIFICANT CHANGE UP (ref 0–0.9)
MONOCYTES NFR BLD AUTO: 4.3 % — SIGNIFICANT CHANGE UP (ref 2–14)
NEUTROPHILS # BLD AUTO: 5.16 K/UL — SIGNIFICANT CHANGE UP (ref 1.8–7.4)
NEUTROPHILS NFR BLD AUTO: 72.1 % — SIGNIFICANT CHANGE UP (ref 43–77)
NITRITE UR-MCNC: NEGATIVE — SIGNIFICANT CHANGE UP
NRBC # BLD: 0 /100 WBCS — SIGNIFICANT CHANGE UP (ref 0–0)
PH UR: 7 — SIGNIFICANT CHANGE UP (ref 5–8)
PLATELET # BLD AUTO: 203 K/UL — SIGNIFICANT CHANGE UP (ref 150–400)
POTASSIUM SERPL-MCNC: 4.5 MMOL/L — SIGNIFICANT CHANGE UP (ref 3.5–5.3)
POTASSIUM SERPL-SCNC: 4.5 MMOL/L — SIGNIFICANT CHANGE UP (ref 3.5–5.3)
PROT SERPL-MCNC: 7 G/DL — SIGNIFICANT CHANGE UP (ref 6–8.3)
PROT UR-MCNC: SIGNIFICANT CHANGE UP MG/DL
RBC # BLD: 4.34 M/UL — SIGNIFICANT CHANGE UP (ref 3.8–5.2)
RBC # FLD: 12.9 % — SIGNIFICANT CHANGE UP (ref 10.3–14.5)
RBC CASTS # UR COMP ASSIST: 0 /HPF — SIGNIFICANT CHANGE UP (ref 0–4)
SODIUM SERPL-SCNC: 140 MMOL/L — SIGNIFICANT CHANGE UP (ref 135–145)
SP GR SPEC: 1.02 — SIGNIFICANT CHANGE UP (ref 1–1.03)
SQUAMOUS # UR AUTO: 0 /HPF — SIGNIFICANT CHANGE UP (ref 0–5)
UROBILINOGEN FLD QL: 1 MG/DL — SIGNIFICANT CHANGE UP (ref 0.2–1)
WBC # BLD: 7.15 K/UL — SIGNIFICANT CHANGE UP (ref 3.8–10.5)
WBC # FLD AUTO: 7.15 K/UL — SIGNIFICANT CHANGE UP (ref 3.8–10.5)
WBC UR QL: 2 /HPF — SIGNIFICANT CHANGE UP (ref 0–5)

## 2024-09-23 PROCEDURE — 30901 CONTROL OF NOSEBLEED: CPT | Mod: LT

## 2024-09-23 PROCEDURE — 99284 EMERGENCY DEPT VISIT MOD MDM: CPT

## 2024-09-23 PROCEDURE — 80053 COMPREHEN METABOLIC PANEL: CPT

## 2024-09-23 PROCEDURE — 36415 COLL VENOUS BLD VENIPUNCTURE: CPT

## 2024-09-23 PROCEDURE — 85025 COMPLETE CBC W/AUTO DIFF WBC: CPT

## 2024-09-23 PROCEDURE — 99284 EMERGENCY DEPT VISIT MOD MDM: CPT | Mod: 25

## 2024-09-23 PROCEDURE — 81001 URINALYSIS AUTO W/SCOPE: CPT

## 2024-09-23 PROCEDURE — 87086 URINE CULTURE/COLONY COUNT: CPT

## 2024-09-23 RX ORDER — SILVER NITRATE 38.21; 12.74 MG/1; MG/1
1 STICK TOPICAL ONCE
Refills: 0 | Status: COMPLETED | OUTPATIENT
Start: 2024-09-23 | End: 2024-09-23

## 2024-09-23 RX ORDER — OXYMETAZOLINE HYDROCHLORIDE 0.05 G/100ML
2 SPRAY, METERED NASAL ONCE
Refills: 0 | Status: COMPLETED | OUTPATIENT
Start: 2024-09-23 | End: 2024-09-23

## 2024-09-23 RX ADMIN — OXYMETAZOLINE HYDROCHLORIDE 2 SPRAY(S): 0.05 SPRAY, METERED NASAL at 13:50

## 2024-09-23 NOTE — CONSULT NOTE ADULT - SUBJECTIVE AND OBJECTIVE BOX
CC: L. Epistaxis    HPI: 89-year-old female history of pacemaker, A-fib on Xarelto presents with left nares epistaxis x 1 week.  Patient had intermittent episodes that were resolved with ice and pressure, this morning had an increased episode of bleeding.  Denies any recent illness, fevers, chills. On exam, well-appearing, no acute distress, no active bleeding.  Right nares no bleeding, left nares with medial septal scab with dried blood, no active bleeding.  No hematoma.  Oropharynx clear.         PAST MEDICAL & SURGICAL HISTORY:  HTN (hypertension)      Afib      HLD (hyperlipidemia)      Hypothyroid      DM (diabetes mellitus)      Pulmonary hypertension      No significant past surgical history        Allergies    penicillin (Hives)    Intolerances      MEDICATIONS  (STANDING):    MEDICATIONS  (PRN):      Social History: **??**    Family history: **??**    ROS:   ENT: all negative except as noted in HPI   CV: denies palpitations  Pulm: denies SOB, cough, hemoptysis  GI: denies change in apetite, indigestion, n/v  : denies pertinent urinary symptoms, urgency  Neuro: denies numbness/tingling, loss of sensation  Psych: denies anxiety  MS: denies muscle weakness, instability  Heme: denies easy bruising or bleeding  Endo: denies heat/cold intolerance, excessive sweating  Vascular: denies LE edema    Vital Signs Last 24 Hrs  T(C): 36.5 (23 Sep 2024 16:32), Max: 36.9 (23 Sep 2024 11:11)  T(F): 97.7 (23 Sep 2024 16:32), Max: 98.4 (23 Sep 2024 11:11)  HR: 74 (23 Sep 2024 16:32) (74 - 100)  BP: 141/90 (23 Sep 2024 16:32) (141/90 - 151/92)  BP(mean): --  RR: 17 (23 Sep 2024 16:32) (17 - 19)  SpO2: 99% (23 Sep 2024 16:32) (98% - 99%)    Parameters below as of 23 Sep 2024 16:32  Patient On (Oxygen Delivery Method): room air                              13.0   7.15  )-----------( 203      ( 23 Sep 2024 12:41 )             40.6    09-23    140  |  105  |  19  ----------------------------<  105[H]  4.5   |  25  |  0.51    Ca    10.2      23 Sep 2024 12:41    TPro  7.0  /  Alb  3.8  /  TBili  0.4  /  DBili  x   /  AST  13  /  ALT  15  /  AlkPhos  103  09-23       PHYSICAL EXAM:  Gen: NAD  Skin: No rashes, bruises, or lesions  Head: Normocephalic, Atraumatic  Face: no edema, erythema, or fluctuance. Parotid glands soft without mass  Eyes: no scleral injection  Ears: Right - ear canal clear, TM intact without effusion or erythema. No evidence of any fluid drainage. No mastoid tenderness, erythema, or ear bulging            Left - ear canal clear, TM intact without effusion or erythema. No evidence of any fluid drainage. No mastoid tenderness, erythema, or ear bulging  Nose: Nares bilaterally patent, no discharge  Mouth: No Stridor / Drooling / Trismus.  Mucosa moist, tongue/uvula midline, oropharynx clear  Neck: Flat, supple, no lymphadenopathy, trachea midline, no masses  Lymphatic: No lymphadenopathy  Resp: breathing easily, no stridor  CV: no peripheral edema/cyanosis  GI: nondistended   Peripheral vascular: no JVD or edema  Neuro: facial nerve intact, no facial droop        Diagnostic Nasal Endoscopy: (Scope #2 used)    Fiberoptic Indirect laryngoscopy:  (Scope #2 used)        IMAGING/ADDITIONAL STUDIES:  CC: L. Epistaxis    HPI: 89-year-old female history of pacemaker, A-fib on Xarelto presents with left nares epistaxis x 1 week. Patient had intermittent episodes that were resolved with ice and pressure, this morning had an increased episode of bleeding.  Denies any recent illness, fevers, chills, bleeding disorders, nasal trauma, recent sinusitis. BP is well controlled.         PAST MEDICAL & SURGICAL HISTORY:  HTN (hypertension)      Afib      HLD (hyperlipidemia)      Hypothyroid      DM (diabetes mellitus)      Pulmonary hypertension      No significant past surgical history        Allergies    penicillin (Hives)    Intolerances      MEDICATIONS  (STANDING):    MEDICATIONS  (PRN):      Social History: unkown    Family history: no pertinent FHx    ROS:   ENT: all negative except as noted in HPI   CV: denies palpitations  Pulm: denies SOB, cough, hemoptysis  GI: denies change in apetite, indigestion, n/v  : denies pertinent urinary symptoms, urgency  Neuro: denies numbness/tingling, loss of sensation  Psych: denies anxiety  MS: denies muscle weakness, instability  Heme: denies easy bruising or bleeding  Endo: denies heat/cold intolerance, excessive sweating  Vascular: denies LE edema    Vital Signs Last 24 Hrs  T(C): 36.5 (23 Sep 2024 16:32), Max: 36.9 (23 Sep 2024 11:11)  T(F): 97.7 (23 Sep 2024 16:32), Max: 98.4 (23 Sep 2024 11:11)  HR: 74 (23 Sep 2024 16:32) (74 - 100)  BP: 141/90 (23 Sep 2024 16:32) (141/90 - 151/92)  BP(mean): --  RR: 17 (23 Sep 2024 16:32) (17 - 19)  SpO2: 99% (23 Sep 2024 16:32) (98% - 99%)    Parameters below as of 23 Sep 2024 16:32  Patient On (Oxygen Delivery Method): room air                              13.0   7.15  )-----------( 203      ( 23 Sep 2024 12:41 )             40.6    09-23    140  |  105  |  19  ----------------------------<  105[H]  4.5   |  25  |  0.51    Ca    10.2      23 Sep 2024 12:41    TPro  7.0  /  Alb  3.8  /  TBili  0.4  /  DBili  x   /  AST  13  /  ALT  15  /  AlkPhos  103  09-23       PHYSICAL EXAM:  Gen: NAD  Skin: No rashes, bruises, or lesions  Head: Normocephalic, Atraumatic  Face: no edema, erythema, or fluctuance. Parotid glands soft without mass  Eyes: no scleral injection  Nose: Small scab covered with blood clot noted on the left anterior septum  Mouth: No Stridor / Drooling / Trismus.  Mucosa moist, tongue/uvula midline, oropharynx clear  Neck: Flat, supple, no lymphadenopathy, trachea midline, no masses  Lymphatic: No lymphadenopathy  Resp: breathing easily, no stridor  CV: no peripheral edema/cyanosis  GI: nondistended   Peripheral vascular: no JVD or edema  Neuro: facial nerve intact, no facial droop      Procedure- Left nasal cauterization- After discussing risks and benefifts with pt, left anterior nasal septum was successfully cauterized. A small amount of  surgicel and baci placed in left nare to prevent further bleeding. Pt tolerated the procedure well without complications.

## 2024-09-23 NOTE — ED PROVIDER NOTE - PATIENT PORTAL LINK FT
You can access the FollowMyHealth Patient Portal offered by [facility] by registering at the following website: [email or web address]. By joining [facility]’s FollowMyHealth portal, you will also be able to view your health information using other applications (apps) compatible with our system.

## 2024-09-23 NOTE — ED PROVIDER NOTE - CARE PROVIDER_API CALL
Caren Velez  Otolaryngology  [address] [place] [address], [address]  [place] [address]  Phone: [phone]  Fax: [phone]  Follow Up Time:

## 2024-09-23 NOTE — ED ADULT NURSE NOTE - NSFALLHARMRISKINTERV_ED_ALL_ED
Assistance OOB with selected safe patient handling equipment if applicable/Assistance with ambulation/Communicate risk of Fall with Harm to all staff, patient, and family/Monitor gait and stability/Provide visual cue: red socks, yellow wristband, yellow gown, etc/Reinforce activity limits and safety measures with patient and family/Bed in lowest position, wheels locked, appropriate side rails in place/Call bell, personal items and telephone in reach/Instruct patient to call for assistance before getting out of bed/chair/stretcher/Non-slip footwear applied when patient is off stretcher/[place] to call system/Physically safe environment - no spills, clutter or unnecessary equipment/Purposeful Proactive Rounding/Room/bathroom lighting operational, light cord in reach

## 2024-09-23 NOTE — CONSULT NOTE ADULT - NS ATTEND AMEND GEN_ALL_CORE FT
small scab left anterior septum (deviated) cauterized   doing well  surgicel and baci placed  f/up outpatient

## 2024-09-23 NOTE — ED PROVIDER NOTE - CLINICAL SUMMARY MEDICAL DECISION MAKING FREE TEXT BOX
RGUJRAL 89-year-old female history of pacemaker, A-fib on Xarelto presents with left nares epistaxis x 1 week.  Patient had intermittent episodes that were resolved with ice and pressure, this morning had an increased episode of bleeding.  Denies any recent illness, fevers, chills. On exam, well-appearing, no acute distress, no active bleeding.  Right nares no bleeding, left nares with medial septal scab with dried blood, no active bleeding.  No hematoma.  Oropharynx clear.  Of note, patient mentions that patient complained of dysuria and had a low blood pressure last week and her medications were adjusted.  Requesting to evaluate for UTI.  On exam patient's abdomen soft nontender. No cvat, VSS. Will obtain screening labs, urine and monitor for epistaxis.

## 2024-09-23 NOTE — CONSULT NOTE ADULT - ASSESSMENT
89-year-old female history of pacemaker, A-fib on Xarelto presents with left nares epistaxis x 1 week. Patient had intermittent episodes that were resolved with ice and pressure, this morning had an increased episode of bleeding. Exam revealed Small scab covered with blood clot on the left anterior septum, cauterized with silver nitrate. Surgicel and baci placed. No further bleeding.

## 2024-09-23 NOTE — ED PROVIDER NOTE - NSFOLLOWUPINSTRUCTIONS_ED_ALL_ED_FT
1. Follow up with your primary care doctor or [facility] at [phone] in 24-48hrs.  2. Follow up with ENT Dr. Velez in 1-2 days.  3. If patient develops worsening symptoms, recurrent bleeding, fever or any other concern return to the ER.

## 2024-09-23 NOTE — CONSULT NOTE ADULT - PROBLEM SELECTOR RECOMMENDATION 9
- Strict blood pressure control.  - Nasal saline, 2 sprays to both nares 4 times a day  - Avoid nasal trauma; no nose rubbing, blowing nose.  Sneeze with mouth open and pinching nares.  - Avoid bending with head blow the waist.    - No heavy lifting.

## 2024-09-23 NOTE — ED ADULT TRIAGE NOTE - PAIN: PRESENCE, MLM
Refill okay as per medication tab.  Hold Crestor during tx  Take 1/2 tablet (5 mg) of Norvasc during tx  Psychiatrist needs to weight in on Trazodone dose today, my recommendation would be to cut down to 1/2 dose for 5 days of tx but final decision is his.   denies pain/discomfort (Rating = 0)

## 2024-09-23 NOTE — ED ADULT NURSE NOTE - CHIEF COMPLAINT QUOTE
Pt continues sleeping in bed at this time.      Shruthi Rockwell RN  09/14/22 7984 intermittent nose bleeding since Saturday  on Xarelto for afib

## 2024-09-24 LAB
CULTURE RESULTS: NO GROWTH — SIGNIFICANT CHANGE UP
SPECIMEN SOURCE: SIGNIFICANT CHANGE UP

## 2024-10-08 ENCOUNTER — INPATIENT (INPATIENT)
Facility: HOSPITAL | Age: 89
LOS: 6 days | Discharge: NURSING, REHAB OR LONG-TERM CARE | DRG: 689 | End: 2024-10-15
Attending: HOSPITALIST | Admitting: HOSPITALIST
Payer: MEDICARE

## 2024-10-08 VITALS
OXYGEN SATURATION: 97 % | SYSTOLIC BLOOD PRESSURE: 125 MMHG | HEART RATE: 89 BPM | WEIGHT: 149.91 LBS | RESPIRATION RATE: 16 BRPM | TEMPERATURE: 98 F | HEIGHT: 69 IN | DIASTOLIC BLOOD PRESSURE: 69 MMHG

## 2024-10-08 DIAGNOSIS — N39.0 URINARY TRACT INFECTION, SITE NOT SPECIFIED: ICD-10-CM

## 2024-10-08 DIAGNOSIS — E78.5 HYPERLIPIDEMIA, UNSPECIFIED: ICD-10-CM

## 2024-10-08 DIAGNOSIS — E03.9 HYPOTHYROIDISM, UNSPECIFIED: ICD-10-CM

## 2024-10-08 DIAGNOSIS — E11.9 TYPE 2 DIABETES MELLITUS WITHOUT COMPLICATIONS: ICD-10-CM

## 2024-10-08 DIAGNOSIS — I10 ESSENTIAL (PRIMARY) HYPERTENSION: ICD-10-CM

## 2024-10-08 DIAGNOSIS — I48.20 CHRONIC ATRIAL FIBRILLATION, UNSPECIFIED: ICD-10-CM

## 2024-10-08 DIAGNOSIS — G92.8 OTHER TOXIC ENCEPHALOPATHY: ICD-10-CM

## 2024-10-08 DIAGNOSIS — F03.90 UNSPECIFIED DEMENTIA, UNSPECIFIED SEVERITY, WITHOUT BEHAVIORAL DISTURBANCE, PSYCHOTIC DISTURBANCE, MOOD DISTURBANCE, AND ANXIETY: ICD-10-CM

## 2024-10-08 DIAGNOSIS — Z29.9 ENCOUNTER FOR PROPHYLACTIC MEASURES, UNSPECIFIED: ICD-10-CM

## 2024-10-08 LAB
ALBUMIN SERPL ELPH-MCNC: 3.7 G/DL — SIGNIFICANT CHANGE UP (ref 3.3–5)
ALBUMIN SERPL ELPH-MCNC: 3.8 G/DL — SIGNIFICANT CHANGE UP (ref 3.3–5)
ALP SERPL-CCNC: 109 U/L — SIGNIFICANT CHANGE UP (ref 40–120)
ALP SERPL-CCNC: 111 U/L — SIGNIFICANT CHANGE UP (ref 40–120)
ALT FLD-CCNC: 14 U/L — SIGNIFICANT CHANGE UP (ref 10–45)
ALT FLD-CCNC: 18 U/L — SIGNIFICANT CHANGE UP (ref 10–45)
ANION GAP SERPL CALC-SCNC: 8 MMOL/L — SIGNIFICANT CHANGE UP (ref 5–17)
ANION GAP SERPL CALC-SCNC: 9 MMOL/L — SIGNIFICANT CHANGE UP (ref 5–17)
APPEARANCE UR: ABNORMAL
APTT BLD: 33.2 SEC — SIGNIFICANT CHANGE UP (ref 24.5–35.6)
AST SERPL-CCNC: 12 U/L — SIGNIFICANT CHANGE UP (ref 10–40)
AST SERPL-CCNC: 36 U/L — SIGNIFICANT CHANGE UP (ref 10–40)
BACTERIA # UR AUTO: ABNORMAL /HPF
BASOPHILS # BLD AUTO: 0.06 K/UL — SIGNIFICANT CHANGE UP (ref 0–0.2)
BASOPHILS NFR BLD AUTO: 0.8 % — SIGNIFICANT CHANGE UP (ref 0–2)
BILIRUB SERPL-MCNC: 0.4 MG/DL — SIGNIFICANT CHANGE UP (ref 0.2–1.2)
BILIRUB SERPL-MCNC: 0.5 MG/DL — SIGNIFICANT CHANGE UP (ref 0.2–1.2)
BILIRUB UR-MCNC: NEGATIVE — SIGNIFICANT CHANGE UP
BUN SERPL-MCNC: 21 MG/DL — SIGNIFICANT CHANGE UP (ref 7–23)
BUN SERPL-MCNC: 22 MG/DL — SIGNIFICANT CHANGE UP (ref 7–23)
CALCIUM SERPL-MCNC: 10.4 MG/DL — SIGNIFICANT CHANGE UP (ref 8.4–10.5)
CALCIUM SERPL-MCNC: 10.6 MG/DL — HIGH (ref 8.4–10.5)
CAST: 5 /LPF — HIGH (ref 0–4)
CHLORIDE SERPL-SCNC: 105 MMOL/L — SIGNIFICANT CHANGE UP (ref 96–108)
CHLORIDE SERPL-SCNC: 106 MMOL/L — SIGNIFICANT CHANGE UP (ref 96–108)
CO2 SERPL-SCNC: 24 MMOL/L — SIGNIFICANT CHANGE UP (ref 22–31)
CO2 SERPL-SCNC: 27 MMOL/L — SIGNIFICANT CHANGE UP (ref 22–31)
COLOR SPEC: YELLOW — SIGNIFICANT CHANGE UP
CREAT SERPL-MCNC: 0.55 MG/DL — SIGNIFICANT CHANGE UP (ref 0.5–1.3)
CREAT SERPL-MCNC: 0.63 MG/DL — SIGNIFICANT CHANGE UP (ref 0.5–1.3)
DIFF PNL FLD: ABNORMAL
EGFR: 85 ML/MIN/1.73M2 — SIGNIFICANT CHANGE UP
EGFR: 88 ML/MIN/1.73M2 — SIGNIFICANT CHANGE UP
EOSINOPHIL # BLD AUTO: 0.03 K/UL — SIGNIFICANT CHANGE UP (ref 0–0.5)
EOSINOPHIL NFR BLD AUTO: 0.4 % — SIGNIFICANT CHANGE UP (ref 0–6)
FLUAV AG NPH QL: SIGNIFICANT CHANGE UP
FLUBV AG NPH QL: SIGNIFICANT CHANGE UP
GAS PNL BLDV: SIGNIFICANT CHANGE UP
GLUCOSE SERPL-MCNC: 106 MG/DL — HIGH (ref 70–99)
GLUCOSE SERPL-MCNC: 84 MG/DL — SIGNIFICANT CHANGE UP (ref 70–99)
GLUCOSE UR QL: NEGATIVE MG/DL — SIGNIFICANT CHANGE UP
HCT VFR BLD CALC: 44.3 % — SIGNIFICANT CHANGE UP (ref 34.5–45)
HGB BLD-MCNC: 13.8 G/DL — SIGNIFICANT CHANGE UP (ref 11.5–15.5)
IMM GRANULOCYTES NFR BLD AUTO: 1.3 % — HIGH (ref 0–0.9)
INR BLD: 1.23 RATIO — HIGH (ref 0.85–1.16)
KETONES UR-MCNC: NEGATIVE MG/DL — SIGNIFICANT CHANGE UP
LEUKOCYTE ESTERASE UR-ACNC: ABNORMAL
LIDOCAIN IGE QN: 26 U/L — SIGNIFICANT CHANGE UP (ref 7–60)
LYMPHOCYTES # BLD AUTO: 1.34 K/UL — SIGNIFICANT CHANGE UP (ref 1–3.3)
LYMPHOCYTES # BLD AUTO: 17.3 % — SIGNIFICANT CHANGE UP (ref 13–44)
MCHC RBC-ENTMCNC: 30.5 PG — SIGNIFICANT CHANGE UP (ref 27–34)
MCHC RBC-ENTMCNC: 31.2 GM/DL — LOW (ref 32–36)
MCV RBC AUTO: 98 FL — SIGNIFICANT CHANGE UP (ref 80–100)
MONOCYTES # BLD AUTO: 0.65 K/UL — SIGNIFICANT CHANGE UP (ref 0–0.9)
MONOCYTES NFR BLD AUTO: 8.4 % — SIGNIFICANT CHANGE UP (ref 2–14)
NEUTROPHILS # BLD AUTO: 5.56 K/UL — SIGNIFICANT CHANGE UP (ref 1.8–7.4)
NEUTROPHILS NFR BLD AUTO: 71.8 % — SIGNIFICANT CHANGE UP (ref 43–77)
NITRITE UR-MCNC: POSITIVE
NRBC # BLD: 0 /100 WBCS — SIGNIFICANT CHANGE UP (ref 0–0)
PH UR: >=9 (ref 5–8)
PLATELET # BLD AUTO: 183 K/UL — SIGNIFICANT CHANGE UP (ref 150–400)
POTASSIUM SERPL-MCNC: 4.6 MMOL/L — SIGNIFICANT CHANGE UP (ref 3.5–5.3)
POTASSIUM SERPL-MCNC: 6.5 MMOL/L — CRITICAL HIGH (ref 3.5–5.3)
POTASSIUM SERPL-SCNC: 4.6 MMOL/L — SIGNIFICANT CHANGE UP (ref 3.5–5.3)
POTASSIUM SERPL-SCNC: 6.5 MMOL/L — CRITICAL HIGH (ref 3.5–5.3)
PROT SERPL-MCNC: 7 G/DL — SIGNIFICANT CHANGE UP (ref 6–8.3)
PROT SERPL-MCNC: 7.5 G/DL — SIGNIFICANT CHANGE UP (ref 6–8.3)
PROT UR-MCNC: 300 MG/DL
PROTHROM AB SERPL-ACNC: 14 SEC — HIGH (ref 9.9–13.4)
RBC # BLD: 4.52 M/UL — SIGNIFICANT CHANGE UP (ref 3.8–5.2)
RBC # FLD: 13.1 % — SIGNIFICANT CHANGE UP (ref 10.3–14.5)
RBC CASTS # UR COMP ASSIST: SIGNIFICANT CHANGE UP /HPF (ref 0–4)
RSV RNA NPH QL NAA+NON-PROBE: SIGNIFICANT CHANGE UP
SARS-COV-2 RNA SPEC QL NAA+PROBE: SIGNIFICANT CHANGE UP
SODIUM SERPL-SCNC: 138 MMOL/L — SIGNIFICANT CHANGE UP (ref 135–145)
SODIUM SERPL-SCNC: 141 MMOL/L — SIGNIFICANT CHANGE UP (ref 135–145)
SP GR SPEC: 1.02 — SIGNIFICANT CHANGE UP (ref 1–1.03)
SQUAMOUS # UR AUTO: 2 /HPF — SIGNIFICANT CHANGE UP (ref 0–5)
TRI-PHOS CRY UR QL COMP ASSIST: PRESENT
UROBILINOGEN FLD QL: 0.2 MG/DL — SIGNIFICANT CHANGE UP (ref 0.2–1)
WBC # BLD: 7.74 K/UL — SIGNIFICANT CHANGE UP (ref 3.8–10.5)
WBC # FLD AUTO: 7.74 K/UL — SIGNIFICANT CHANGE UP (ref 3.8–10.5)
WBC UR QL: >50 /HPF — SIGNIFICANT CHANGE UP (ref 0–5)

## 2024-10-08 PROCEDURE — 99285 EMERGENCY DEPT VISIT HI MDM: CPT | Mod: GC

## 2024-10-08 PROCEDURE — 99223 1ST HOSP IP/OBS HIGH 75: CPT

## 2024-10-08 PROCEDURE — 74177 CT ABD & PELVIS W/CONTRAST: CPT | Mod: 26

## 2024-10-08 PROCEDURE — 71045 X-RAY EXAM CHEST 1 VIEW: CPT | Mod: 26

## 2024-10-08 RX ORDER — 5-HYDROXYTRYPTOPHAN (5-HTP) 100 MG
3 TABLET,DISINTEGRATING ORAL AT BEDTIME
Refills: 0 | Status: DISCONTINUED | OUTPATIENT
Start: 2024-10-08 | End: 2024-10-15

## 2024-10-08 RX ORDER — ACETAMINOPHEN 325 MG
650 TABLET ORAL EVERY 6 HOURS
Refills: 0 | Status: DISCONTINUED | OUTPATIENT
Start: 2024-10-08 | End: 2024-10-15

## 2024-10-08 RX ORDER — SODIUM CHLORIDE 0.9 % (FLUSH) 0.9 %
500 SYRINGE (ML) INJECTION ONCE
Refills: 0 | Status: COMPLETED | OUTPATIENT
Start: 2024-10-08 | End: 2024-10-08

## 2024-10-08 RX ORDER — CEFTRIAXONE SODIUM 1 G
1000 VIAL (EA) INJECTION ONCE
Refills: 0 | Status: COMPLETED | OUTPATIENT
Start: 2024-10-08 | End: 2024-10-08

## 2024-10-08 RX ORDER — CEFTRIAXONE SODIUM 1 G
1000 VIAL (EA) INJECTION EVERY 24 HOURS
Refills: 0 | Status: DISCONTINUED | OUTPATIENT
Start: 2024-10-09 | End: 2024-10-11

## 2024-10-08 RX ADMIN — Medication 100 MILLIGRAM(S): at 22:26

## 2024-10-08 RX ADMIN — Medication 500 MILLILITER(S): at 18:17

## 2024-10-08 NOTE — ED ADULT TRIAGE NOTE - NS ED TRIAGE AVPU SCALE
Pt presents to the ER with mom c/o being irritable since Saturday along with feeling warm, runny nose and cough. UTD on vaccinations. Currently in . Denies any diarrhea.      Alert-The patient is alert, awake and responds to voice. The patient is oriented to time, place, and person. The triage nurse is able to obtain subjective information.

## 2024-10-08 NOTE — H&P ADULT - PROBLEM SELECTOR PLAN 3
- C/w Xarelto 20mg daily  - ECG personally reviewed, Afib w/ occasional V-paced complexes - C/w Xarelto 20mg daily - patient hasn't taken a dose since last week due to recurrence of epistaxis, son would like to trial restarting in the hospital to monitor if it happens again - would discontinue if developing epistaxis again  - ECG personally reviewed, Afib w/ occasional V-paced complexes

## 2024-10-08 NOTE — ED ADULT NURSE REASSESSMENT NOTE - NS ED NURSE REASSESS COMMENT FT1
PT changed placed in hospital gown, and able to produce sterile urine sample. Safety and comfort measures initiated- bed placed in lowest position and side rails raised.

## 2024-10-08 NOTE — H&P ADULT - NSHPLABSRESULTS_GEN_ALL_CORE
13.8   7.74  )-----------( 183      ( 08 Oct 2024 18:07 )             44.3     10-08    141  |  106  |  22  ----------------------------<  84  4.6   |  27  |  0.63    Ca    10.4      08 Oct 2024 18:52    TPro  7.0  /  Alb  3.8  /  TBili  0.4  /  DBili  x   /  AST  12  /  ALT  14  /  AlkPhos  109  10-08          LIVER FUNCTIONS - ( 08 Oct 2024 18:52 )  Alb: 3.8 g/dL / Pro: 7.0 g/dL / ALK PHOS: 109 U/L / ALT: 14 U/L / AST: 12 U/L / GGT: x           PT/INR - ( 08 Oct 2024 18:52 )   PT: 14.0 sec;   INR: 1.23 ratio         PTT - ( 08 Oct 2024 18:52 )  PTT:33.2 sec  Urinalysis Basic - ( 08 Oct 2024 20:55 )    Color: Yellow / Appearance: Turbid / S.019 / pH: x  Gluc: x / Ketone: Negative mg/dL  / Bili: Negative / Urobili: 0.2 mg/dL   Blood: x / Protein: 300 mg/dL / Nitrite: Positive   Leuk Esterase: Large / RBC: 18-20 /HPF / WBC >50 /HPF   Sq Epi: x / Non Sq Epi: 2 /HPF / Bacteria: Many /HPF

## 2024-10-08 NOTE — ED ADULT NURSE NOTE - NSFALLHARMRISKINTERV_ED_ALL_ED

## 2024-10-08 NOTE — ED PROVIDER NOTE - CARDIAC, MLM
Normal rate, regular rhythm.  Heart sounds S1, S2.  No murmurs, rubs or gallops. IRREGULAR irregular 73

## 2024-10-08 NOTE — H&P ADULT - NSHPREVIEWOFSYSTEMS_GEN_ALL_CORE
Review of Systems:   CONSTITUTIONAL: No fever, weight loss  EYES: No eye pain, visual disturbances, or discharge  RESPIRATORY: No SOB. No cough, wheezing, chills or hemoptysis  CARDIOVASCULAR: No chest pain, palpitations, dizziness, or leg swelling  GASTROINTESTINAL: No abdominal or epigastric pain. No nausea, vomiting, or hematemesis; No diarrhea or constipation. No melena or hematochezia.  GENITOURINARY: No dysuria, frequency, hematuria, or incontinence  NEUROLOGICAL: No headaches, memory loss, loss of strength, numbness, or tremors  SKIN: No itching, burning, rashes, or lesions   MUSCULOSKELETAL: No joint pain or swelling; No muscle, back pain  PSYCHIATRIC: No depression, anxiety, mood swings, or difficulty sleeping  HEME/LYMPH: No easy bruising, or bleeding gums Review of Systems:   CONSTITUTIONAL: No fever, weight loss  EYES: No eye pain, visual disturbances, or discharge  RESPIRATORY: No SOB. No cough, wheezing, chills or hemoptysis  CARDIOVASCULAR: No chest pain, palpitations, dizziness, or leg swelling  GASTROINTESTINAL: No abdominal or epigastric pain. No nausea, vomiting, or hematemesis; No diarrhea or constipation. No melena or hematochezia.  GENITOURINARY: +frequency urgency, and dysuria; no hematuria, or incontinence  NEUROLOGICAL: No headaches, memory loss, loss of strength, numbness, or tremors  SKIN: No itching, burning, rashes, or lesions   MUSCULOSKELETAL: No joint pain or swelling; No muscle, back pain  PSYCHIATRIC: No depression, anxiety, mood swings, or difficulty sleeping  HEME/LYMPH: No easy bruising, or bleeding gums

## 2024-10-08 NOTE — ED ADULT NURSE NOTE - NS ED NURSE LEVEL OF CONSCIOUSNESS SPEECH
Speaking Coherently Graft Donor Site Bandage (Optional-Leave Blank If You Don't Want In Note): a pressure bandage was applied to the surgery site.

## 2024-10-08 NOTE — H&P ADULT - PROBLEM SELECTOR PLAN 1
- UA w/ large LE, positive nitrites, >50 WBC, and many bacteria  - CT A/P w/ circumferential thickening of the urinary bladder walls with mucosal hyperenhancement and mild perivesicular fat stranding concerning for cystitis  - F/u blood and urine cultures  - C/w IV Ceftriaxone 1g daily - UA w/ large LE, positive nitrites, >50 WBC, and many bacteria  - CT A/P w/ circumferential thickening of the urinary bladder walls with mucosal hyperenhancement and mild perivesicular fat stranding concerning for cystitis  - F/u blood and urine cultures  - C/w IV Ceftriaxone 1g daily  - Patient has history of UTIs, was admitted in January this year for UTI 2/2 proteus mirabilis

## 2024-10-08 NOTE — H&P ADULT - NSICDXPASTSURGICALHX_GEN_ALL_CORE_FT
PAST SURGICAL HISTORY:  No significant past surgical history      PAST SURGICAL HISTORY:  Cardiac pacemaker      Cheiloplasty (Less Than 50%) Text: A decision was made to reconstruct the defect with a  cheiloplasty.  The defect was undermined extensively.  Additional orbicularis oris muscle was excised with a 15 blade scalpel.  The defect was converted into a full thickness wedge, of less than 50% of the vertical height of the lip, to facilite a better cosmetic result.  Small vessels were then tied off with 5-0 monocyrl. The orbicularis oris, superficial fascia, adipose and dermis were then reapproximated.  After the deeper layers were approximated the epidermis was reapproximated with particular care given to realign the vermilion border.

## 2024-10-08 NOTE — ED PROVIDER NOTE - OBJECTIVE STATEMENT
89-year-old female with a history of diabetes, hyperlipidemia, hypertension, dementia, chronic A-fib on rivaroxaban   last time seen in the emergency room September 23 for epistaxis, presented with increased altered mental status ,more weak and complains of frequency and urgency 89-year-old female with a history of diabetes, hyperlipidemia, hypertension, dementia, chronic A-fib on rivaroxaban   last time seen in the emergency room September 23 for epistaxis And blood thinner was stopped, presented with increased altered mental status ,more weak and complains of frequency and urgency and burning in the lower part of her abdomen while urinating , history is taken from the daughter who is near bedside, she lives with her 24 hours aid , no fever no chills  nausea vomiting  primary care doctor dr Jack

## 2024-10-08 NOTE — H&P ADULT - PROBLEM SELECTOR PLAN 2
- Likely 2/2 UTI  - Monitor for improvement w/ abx - Likely 2/2 UTI  - Currently oriented to person  - Monitor for improvement w/ abx

## 2024-10-08 NOTE — H&P ADULT - NSHPPHYSICALEXAM_GEN_ALL_CORE
Vital Signs Last 24 Hrs  T(C): 37.4 (08 Oct 2024 19:33), Max: 37.4 (08 Oct 2024 19:33)  T(F): 99.4 (08 Oct 2024 19:33), Max: 99.4 (08 Oct 2024 19:33)  HR: 88 (08 Oct 2024 19:33) (88 - 89)  BP: 149/94 (08 Oct 2024 19:33) (125/69 - 149/94)  BP(mean): --  RR: 18 (08 Oct 2024 19:33) (16 - 18)  SpO2: 97% (08 Oct 2024 19:33) (97% - 97%)    Parameters below as of 08 Oct 2024 19:33  Patient On (Oxygen Delivery Method): room air        CONSTITUTIONAL: Well-groomed, in no apparent distress  EYES: No conjunctival or scleral injection, non-icteric;   NECK: Trachea midline without palpable neck mass  RESPIRATORY: Breathing comfortably; lungs CTA without wheeze/rhonchi/rales  CARDIOVASCULAR: +S1S2, RRR, no M/G/R; no lower extremity edema  GASTROINTESTINAL: No palpable masses or tenderness, +BS throughout, no rebound/guarding  SKIN: No rashes or ulcers noted  NEUROLOGIC: Sensation intact in LEs b/l to light touch  PSYCHIATRIC: A+O x Vital Signs Last 24 Hrs  T(C): 37.4 (08 Oct 2024 19:33), Max: 37.4 (08 Oct 2024 19:33)  T(F): 99.4 (08 Oct 2024 19:33), Max: 99.4 (08 Oct 2024 19:33)  HR: 88 (08 Oct 2024 19:33) (88 - 89)  BP: 149/94 (08 Oct 2024 19:33) (125/69 - 149/94)  BP(mean): --  RR: 18 (08 Oct 2024 19:33) (16 - 18)  SpO2: 97% (08 Oct 2024 19:33) (97% - 97%)    Parameters below as of 08 Oct 2024 19:33  Patient On (Oxygen Delivery Method): room air        CONSTITUTIONAL: Well-groomed, in no apparent distress  EYES: No conjunctival or scleral injection, non-icteric;   NECK: Trachea midline without palpable neck mass  RESPIRATORY: Breathing comfortably; lungs CTA without wheeze/rhonchi/rales  CARDIOVASCULAR: +S1S2, RRR, no M/G/R; no lower extremity edema  GASTROINTESTINAL: No palpable masses or tenderness, +BS throughout, no rebound/guarding  SKIN: No rashes or ulcers noted  NEUROLOGIC: Sensation intact in LEs b/l to light touch  PSYCHIATRIC: Awake, alert, oriented to person, not to place or time

## 2024-10-08 NOTE — ED ADULT NURSE NOTE - OBJECTIVE STATEMENT
89 yo presents to the ED from home. A&Ox1, baseline of dementia but more altered as per family c/o weakness. family reports pt has been weak, decreased PO intake, AMS x 1 week., family reports burning on urination and frequency. denies fever, chills. has 24 hour aides. denies falls or trauma. reports changes in anticoagulation meds due to recent epistaxis. 20G inserted L hand. Patient undressed and placed into gown, call bell in hand and side rails up for safety. warm blanket provided, vital signs stable, pt in no acute distress.

## 2024-10-08 NOTE — H&P ADULT - HISTORY OF PRESENT ILLNESS
This is an 89 y/o female w/ PMHx chronic Afib on Xarelto w/ PPM, HTN, severe pulmonary HTN, hypothyroidism, and dementia who presents for urinary frequency, urgency, and dysuria, along with altered mental status. Symptoms started  days ago. She has been complaining of pain in her lower abdomen when she urinates, having to go to the bathroom more often to urinate. Has been more confused recently as well. Daughter brought her into the ED for further evaluation.    In the ED, she was afebrile and hemodynamically stable, saturating well on RA. CBC and CMP wnl. UA w/ large LE and positive nitrites, >50 WBC, and many bacteria. CT A/P w/ circumferential thickening of the urinary bladder walls w/ mucosal hyperenhancement and mild perivesicular fat stranding concerning for cystitis, and colonic diverticulosis w/o diverticulitis. Given 500cc IVNS and CTX 1g in the ED.  This is an 89 y/o female w/ PMHx chronic Afib on Xarelto w/ PPM, HTN, severe pulmonary HTN, hypothyroidism, and dementia who presents for urinary frequency, urgency, and dysuria, along with altered mental status. Symptoms started a day ago. She has been complaining of burning in her lower abdomen when she urinates, having to go to the bathroom more often to urinate. Has been more confused since this AM as well. 24hr aides notified her son, who brought her into the ED for further evaluation. Patient has history of UTIs, was admitted in January this year for UTI 2/2 proteus mirabilis.    In the ED, she was afebrile and hemodynamically stable, saturating well on RA. CBC and CMP wnl. UA w/ large LE and positive nitrites, >50 WBC, and many bacteria. CT A/P w/ circumferential thickening of the urinary bladder walls w/ mucosal hyperenhancement and mild perivesicular fat stranding concerning for cystitis, and colonic diverticulosis w/o diverticulitis. Given 500cc IVNS and CTX 1g in the ED. Currently she feels asymptomatic, denies any complaints.

## 2024-10-08 NOTE — ED PROVIDER NOTE - CLINICAL SUMMARY MEDICAL DECISION MAKING FREE TEXT BOX
89-year-old female with dementia and multiple medical issues presented with her daughter who states with the mom is more lethargic and complaining of frequent urination and burning sensation that she passed her urine  concerning for UTI    will obtain blood work, urine culture and UA fluids and reassess ZR

## 2024-10-08 NOTE — H&P ADULT - ASSESSMENT
8 y/o female w/ PMHx chronic Afib on Xarelto w/ PPM, HTN, severe pulmonary HTN, hypothyroidism, and dementia who presents for urinary frequency, urgency, and dysuria, along with altered mental status. Found to have UA concerning for UTI, CT concerning for cystitis. Admitted for UTI w/ associated encephalopathy.

## 2024-10-09 ENCOUNTER — TRANSCRIPTION ENCOUNTER (OUTPATIENT)
Age: 89
End: 2024-10-09

## 2024-10-09 DIAGNOSIS — Z95.0 PRESENCE OF CARDIAC PACEMAKER: Chronic | ICD-10-CM

## 2024-10-09 LAB
ALBUMIN SERPL ELPH-MCNC: 3.7 G/DL — SIGNIFICANT CHANGE UP (ref 3.3–5)
ALP SERPL-CCNC: 109 U/L — SIGNIFICANT CHANGE UP (ref 40–120)
ALT FLD-CCNC: 9 U/L — LOW (ref 10–45)
ANION GAP SERPL CALC-SCNC: 11 MMOL/L — SIGNIFICANT CHANGE UP (ref 5–17)
AST SERPL-CCNC: 11 U/L — SIGNIFICANT CHANGE UP (ref 10–40)
BILIRUB SERPL-MCNC: 0.5 MG/DL — SIGNIFICANT CHANGE UP (ref 0.2–1.2)
BUN SERPL-MCNC: 17 MG/DL — SIGNIFICANT CHANGE UP (ref 7–23)
CALCIUM SERPL-MCNC: 10.5 MG/DL — SIGNIFICANT CHANGE UP (ref 8.4–10.5)
CHLORIDE SERPL-SCNC: 105 MMOL/L — SIGNIFICANT CHANGE UP (ref 96–108)
CO2 SERPL-SCNC: 22 MMOL/L — SIGNIFICANT CHANGE UP (ref 22–31)
CREAT SERPL-MCNC: 0.51 MG/DL — SIGNIFICANT CHANGE UP (ref 0.5–1.3)
EGFR: 90 ML/MIN/1.73M2 — SIGNIFICANT CHANGE UP
GLUCOSE SERPL-MCNC: 112 MG/DL — HIGH (ref 70–99)
HCT VFR BLD CALC: 42.8 % — SIGNIFICANT CHANGE UP (ref 34.5–45)
HGB BLD-MCNC: 13.5 G/DL — SIGNIFICANT CHANGE UP (ref 11.5–15.5)
MCHC RBC-ENTMCNC: 29.9 PG — SIGNIFICANT CHANGE UP (ref 27–34)
MCHC RBC-ENTMCNC: 31.5 GM/DL — LOW (ref 32–36)
MCV RBC AUTO: 94.7 FL — SIGNIFICANT CHANGE UP (ref 80–100)
NRBC # BLD: 0 /100 WBCS — SIGNIFICANT CHANGE UP (ref 0–0)
PLATELET # BLD AUTO: 224 K/UL — SIGNIFICANT CHANGE UP (ref 150–400)
POTASSIUM SERPL-MCNC: 4 MMOL/L — SIGNIFICANT CHANGE UP (ref 3.5–5.3)
POTASSIUM SERPL-SCNC: 4 MMOL/L — SIGNIFICANT CHANGE UP (ref 3.5–5.3)
PROT SERPL-MCNC: 6.9 G/DL — SIGNIFICANT CHANGE UP (ref 6–8.3)
RBC # BLD: 4.52 M/UL — SIGNIFICANT CHANGE UP (ref 3.8–5.2)
RBC # FLD: 13 % — SIGNIFICANT CHANGE UP (ref 10.3–14.5)
SODIUM SERPL-SCNC: 138 MMOL/L — SIGNIFICANT CHANGE UP (ref 135–145)
WBC # BLD: 7.49 K/UL — SIGNIFICANT CHANGE UP (ref 3.8–10.5)
WBC # FLD AUTO: 7.49 K/UL — SIGNIFICANT CHANGE UP (ref 3.8–10.5)

## 2024-10-09 PROCEDURE — 99233 SBSQ HOSP IP/OBS HIGH 50: CPT | Mod: GC

## 2024-10-09 RX ORDER — SPIRONOLACTONE 100 MG/1
25 TABLET, FILM COATED ORAL EVERY 24 HOURS
Refills: 0 | Status: DISCONTINUED | OUTPATIENT
Start: 2024-10-09 | End: 2024-10-15

## 2024-10-09 RX ORDER — RIVAROXABAN 10 MG/1
20 TABLET, FILM COATED ORAL
Refills: 0 | Status: DISCONTINUED | OUTPATIENT
Start: 2024-10-09 | End: 2024-10-15

## 2024-10-09 RX ORDER — CRANBERRY FRUIT EXTRACT 650 MG
5000 CAPSULE ORAL DAILY
Refills: 0 | Status: DISCONTINUED | OUTPATIENT
Start: 2024-10-09 | End: 2024-10-15

## 2024-10-09 RX ORDER — CARVEDILOL 3.125 MG
3.12 TABLET ORAL EVERY 12 HOURS
Refills: 0 | Status: DISCONTINUED | OUTPATIENT
Start: 2024-10-09 | End: 2024-10-15

## 2024-10-09 RX ORDER — MULTI VITAMIN/MINERAL SUPPLEMENT WITH ASCORBIC ACID, NIACIN, PYRIDOXINE, PANTOTHENIC ACID, FOLIC ACID, RIBOFLAVIN, THIAMIN, BIOTIN, COBALAMIN AND ZINC. 60; 20; 12.5; 10; 10; 1.7; 1.5; 1; .3; .006 MG/1; MG/1; MG/1; MG/1; MG/1; MG/1; MG/1; MG/1; MG/1; MG/1
1 TABLET, COATED ORAL DAILY
Refills: 0 | Status: DISCONTINUED | OUTPATIENT
Start: 2024-10-09 | End: 2024-10-15

## 2024-10-09 RX ORDER — FOLIC ACID 1 MG/1
1 TABLET ORAL DAILY
Refills: 0 | Status: DISCONTINUED | OUTPATIENT
Start: 2024-10-09 | End: 2024-10-15

## 2024-10-09 RX ADMIN — Medication 3.12 MILLIGRAM(S): at 22:00

## 2024-10-09 RX ADMIN — Medication 100 MILLIGRAM(S): at 22:00

## 2024-10-09 RX ADMIN — RIVAROXABAN 20 MILLIGRAM(S): 10 TABLET, FILM COATED ORAL at 17:46

## 2024-10-09 RX ADMIN — Medication 75 MICROGRAM(S): at 05:04

## 2024-10-09 RX ADMIN — Medication 5000 UNIT(S): at 12:08

## 2024-10-09 RX ADMIN — MULTI VITAMIN/MINERAL SUPPLEMENT WITH ASCORBIC ACID, NIACIN, PYRIDOXINE, PANTOTHENIC ACID, FOLIC ACID, RIBOFLAVIN, THIAMIN, BIOTIN, COBALAMIN AND ZINC. 1 TABLET(S): 60; 20; 12.5; 10; 10; 1.7; 1.5; 1; .3; .006 TABLET, COATED ORAL at 12:07

## 2024-10-09 RX ADMIN — Medication 3.12 MILLIGRAM(S): at 09:15

## 2024-10-09 RX ADMIN — SPIRONOLACTONE 25 MILLIGRAM(S): 100 TABLET, FILM COATED ORAL at 09:14

## 2024-10-09 RX ADMIN — Medication 1000 MILLIGRAM(S): at 12:07

## 2024-10-09 RX ADMIN — FOLIC ACID 1 MILLIGRAM(S): 1 TABLET ORAL at 12:08

## 2024-10-09 NOTE — PATIENT PROFILE ADULT - FALL HARM RISK - HARM RISK INTERVENTIONS

## 2024-10-09 NOTE — PHYSICAL THERAPY INITIAL EVALUATION ADULT - TRANSFER TRAINING, PT EVAL
Pt will perform sit to stand transfers CGA w/ least restrictive device  in 2 wks to improve overall functional mobility.

## 2024-10-09 NOTE — DISCHARGE NOTE PROVIDER - HOSPITAL COURSE
This is an 88 y/o female w/ PMHx chronic Afib on Xarelto w/ PPM, HTN, severe pulmonary HTN, hypothyroidism, and dementia who presents for urinary frequency, urgency, and dysuria, along with altered mental status. Symptoms started a day ago. She has been complaining of burning in her lower abdomen when she urinates, having to go to the bathroom more often to urinate. Has been more confused since this AM as well. 24hr aides notified her son, who brought her into the ED for further evaluation. Patient has history of UTIs, was admitted in January this year for UTI 2/2 proteus mirabilis.    In the ED, she was afebrile and hemodynamically stable, saturating well on RA. CBC and CMP wnl. UA w/ large LE and positive nitrites, >50 WBC, and many bacteria. CT A/P w/ circumferential thickening of the urinary bladder walls w/ mucosal hyperenhancement and mild perivesicular fat stranding concerning for cystitis, and colonic diverticulosis w/o diverticulitis. Given 500cc IVNS and CTX 1g in the ED. Currently she feels asymptomatic, denies any complaints.       Problem/Plan - 1:  ·  Problem: Acute UTI.   ·  Plan: - UA w/ large LE, positive nitrites, >50 WBC, and many bacteria  - CT A/P w/ circumferential thickening of the urinary bladder walls with mucosal hyperenhancement and mild perivesicular fat stranding concerning for cystitis  - C/w IV Ceftriaxone 1g daily.     Problem/Plan - 2:  ·  Problem: Toxic metabolic encephalopathy.   ·  Plan: - Likely 2/2 UTI  - Currently oriented to person       Problem/Plan - 3:  ·  Problem: Chronic atrial fibrillation.   ·  Plan: - C/w Xarelto 20mg daily - patient hasn't taken a dose since last week due to recurrence of epistaxis, son would like to trial restarting in the hospital to monitor if it happens again - would discontinue if developing epistaxis again  - ECG reviewed, Afib w/ occasional V-paced complexes.     Problem/Plan - 4:  ·  Problem: HTN (hypertension).   ·  Plan: - C/w Carvedilol 3.125mg BID  - C/w spironolactone 25mg daily.     Problem/Plan - 5:  ·  Problem: Hypothyroidism.   ·  Plan: - C/w levothyroxine 75mcg daily.     Problem/Plan - 6:  ·  Problem: Dementia.   ·  Plan: - Baseline mental status is AAOx2.   HPI:  This is an 88 y/o female w/ PMHx chronic Afib on Xarelto w/ PPM, HTN, severe pulmonary HTN, hypothyroidism, and dementia who presents for urinary frequency, urgency, and dysuria, along with altered mental status. Symptoms started a day ago. She has been complaining of burning in her lower abdomen when she urinates, having to go to the bathroom more often to urinate. Has been more confused since this AM as well. 24hr aides notified her son, who brought her into the ED for further evaluation. Patient has history of UTIs, was admitted in January this year for UTI 2/2 proteus mirabilis.    In the ED, she was afebrile and hemodynamically stable, saturating well on RA. CBC and CMP wnl. UA w/ large LE and positive nitrites, >50 WBC, and many bacteria. CT A/P w/ circumferential thickening of the urinary bladder walls w/ mucosal hyperenhancement and mild perivesicular fat stranding concerning for cystitis, and colonic diverticulosis w/o diverticulitis. Given 500cc IVNS and CTX 1g in the ED. Currently she feels asymptomatic, denies any complaints.  (08 Oct 2024 23:24)    Hospital Course:  Patient admitted with acute metabolic encephalopathy secondary to UTI. UA w/ large LE, positive nitrites, >50 WBC, and many bacteria. CT A/P w/ circumferential thickening of the urinary bladder walls with mucosal hyperenhancement and mild perivesicular fat stranding concerning for cystitis. Patient received IV ceftriaxone during admission     Important Medication Changes and Reason:  >Continue all medications per med rec    Active or Pending Issues Requiring Follow-up:  >PCP follow up within one week for further outpatient management     Advanced Directives:   [X] Full code  [ ] DNR  [ ] Hospice    Discharge Diagnoses:  >UTI     Discharge/dispo/med rec discussed with medical attending Dr. Geller. Patient is medically cleared for discharge with outpatient follow up         HPI:  This is an 88 y/o female w/ PMHx chronic Afib on Xarelto w/ PPM, HTN, severe pulmonary HTN, hypothyroidism, and dementia who presents for urinary frequency, urgency, and dysuria, along with altered mental status. Symptoms started a day ago. She has been complaining of burning in her lower abdomen when she urinates, having to go to the bathroom more often to urinate. Has been more confused since this AM as well. 24hr aides notified her son, who brought her into the ED for further evaluation. Patient has history of UTIs, was admitted in January this year for UTI 2/2 proteus mirabilis.    In the ED, she was afebrile and hemodynamically stable, saturating well on RA. CBC and CMP wnl. UA w/ large LE and positive nitrites, >50 WBC, and many bacteria. CT A/P w/ circumferential thickening of the urinary bladder walls w/ mucosal hyperenhancement and mild perivesicular fat stranding concerning for cystitis, and colonic diverticulosis w/o diverticulitis. Given 500cc IVNS and CTX 1g in the ED. Currently she feels asymptomatic, denies any complaints.  (08 Oct 2024 23:24)    Hospital Course:  Patient admitted with acute metabolic encephalopathy secondary to UTI. UA w/ large LE, positive nitrites, >50 WBC, and many bacteria. CT A/P w/ circumferential thickening of the urinary bladder walls with mucosal hyperenhancement and mild perivesicular fat stranding concerning for cystitis. Patient received IV ceftriaxone during admission     Important Medication Changes and Reason:  >Continue all medications per med rec    Active or Pending Issues Requiring Follow-up:  >PCP follow up within one week for further outpatient management     Advanced Directives:   [X] Full code  [ ] DNR  [ ] Hospice    Discharge Diagnoses:  >UTI     Discharge/dispo/med rec discussed with medical attending Dr. Geller. Patient is medically cleared for discharge to [facility] with outpatient follow up

## 2024-10-09 NOTE — DISCHARGE NOTE PROVIDER - NSDCCPCAREPLAN_GEN_ALL_CORE_FT
PRINCIPAL DISCHARGE DIAGNOSIS  Diagnosis: Acute UTI  Assessment and Plan of Treatment: You had a bladder and/or kidney infection.  If you are discharged on antibiotics, you will need to finish the medication as prescribed to reduced the likelihood that the infection will recur.  Avoid medications that will cause urinary retention such as benadryl whenever possible.  Drink adequate fluids - there is no harm in drinking cranberry juice.  Females should urinate right after sex.  Contact your doctor if you experience new symptoms or continued symptoms after treatment, such as pain or burning with urination, frequent urination, urinary urgency, blood in the urine, fever, back pain, and/or nausea vomiting.     PRINCIPAL DISCHARGE DIAGNOSIS  Diagnosis: Acute UTI  Assessment and Plan of Treatment: You had a bladder infection. You completed IV antibiotics during your admission and improved.  Avoid medications that will cause urinary retention such as benadryl whenever possible.  Drink adequate fluids - there is no harm in drinking cranberry juice.  Females should urinate right after sex.  Contact your doctor if you experience new symptoms or continued symptoms after treatment, such as pain or burning with urination, frequent urination, urinary urgency, blood in the urine, fever, back pain, and/or nausea vomiting.

## 2024-10-09 NOTE — PHYSICAL THERAPY INITIAL EVALUATION ADULT - ADDITIONAL COMMENTS
Pt poor historian. As per care coordination note, pt lives in private home w/ 16 steps to enter and first floor setup. Pt lives alone but has 24/hr 7day HHA who assist w/ ADL's and ambulation. Pt owns cane and RW.

## 2024-10-09 NOTE — DISCHARGE NOTE PROVIDER - CARE PROVIDER_API CALL
Renita Jack  Geriatric Medicine  [address] [place] [id number]  Phone: [phone]  Fax: [phone]  Follow Up Time: 1 week

## 2024-10-09 NOTE — PROGRESS NOTE ADULT - SUBJECTIVE AND OBJECTIVE BOX
PROGRESS NOTE:     Patient is a 89y old  Female who presents with a chief complaint of UTI (09 Oct 2024 01:42)      SUBJECTIVE / OVERNIGHT EVENTS:    MEDICATIONS  (STANDING):  ascorbic acid 1000 milliGRAM(s) Oral daily  carvedilol 3.125 milliGRAM(s) Oral every 12 hours  cefTRIAXone   IVPB 1000 milliGRAM(s) IV Intermittent every 24 hours  cholecalciferol 5000 Unit(s) Oral daily  folic acid 1 milliGRAM(s) Oral daily  levothyroxine 75 MICROGram(s) Oral daily  multivitamin 1 Tablet(s) Oral daily  rivaroxaban 20 milliGRAM(s) Oral with dinner  spironolactone 25 milliGRAM(s) Oral every 24 hours    MEDICATIONS  (PRN):  acetaminophen     Tablet .. 650 milliGRAM(s) Oral every 6 hours PRN Temp greater or equal to 38C (100.4F), Mild Pain (1 - 3)  melatonin 3 milliGRAM(s) Oral at bedtime PRN Insomnia      CAPILLARY BLOOD GLUCOSE        I&O's Summary      PHYSICAL EXAM:  Vital Signs Last 24 Hrs  T(C): 36.9 (09 Oct 2024 05:08), Max: 37.4 (08 Oct 2024 19:33)  T(F): 98.4 (09 Oct 2024 05:08), Max: 99.4 (08 Oct 2024 19:33)  HR: 78 (09 Oct 2024 05:08) (74 - 89)  BP: 144/86 (09 Oct 2024 05:08) (125/69 - 159/94)  BP(mean): 108 (09 Oct 2024 00:08) (108 - 108)  RR: 18 (09 Oct 2024 05:08) (16 - 18)  SpO2: 95% (09 Oct 2024 05:08) (94% - 98%)    Parameters below as of 09 Oct 2024 05:08  Patient On (Oxygen Delivery Method): room air        GENERAL: No acute distress, well-developed  HEAD:  Atraumatic, Normocephalic  EYES: EOMI, PERRLA, conjunctiva and sclera clear  NECK: Supple, no lymphadenopathy, no JVD  CHEST/LUNG: CTAB; No wheezes, rales, or rhonchi  HEART: Regular rate and rhythm; No murmurs, rubs, or gallops  ABDOMEN: Soft, non-tender, non-distended; normal bowel sounds, no organomegaly  EXTREMITIES:  2+ peripheral pulses b/l, No clubbing, cyanosis, or edema  NEUROLOGY: A&O x 3, no focal deficits  SKIN: No rashes or lesions    LABS:                        13.5   7.49  )-----------( 224      ( 09 Oct 2024 07:13 )             42.8     10-09    138  |  105  |  17  ----------------------------<  112[H]  4.0   |  22  |  0.51    Ca    10.5      09 Oct 2024 07:13    TPro  6.9  /  Alb  3.7  /  TBili  0.5  /  DBili  x   /  AST  11  /  ALT  9[L]  /  AlkPhos  109  10-09    PT/INR - ( 08 Oct 2024 18:52 )   PT: 14.0 sec;   INR: 1.23 ratio         PTT - ( 08 Oct 2024 18:52 )  PTT:33.2 sec      Urinalysis Basic - ( 09 Oct 2024 07:13 )    Color: x / Appearance: x / SG: x / pH: x  Gluc: 112 mg/dL / Ketone: x  / Bili: x / Urobili: x   Blood: x / Protein: x / Nitrite: x   Leuk Esterase: x / RBC: x / WBC x   Sq Epi: x / Non Sq Epi: x / Bacteria: x          RADIOLOGY & ADDITIONAL TESTS:  Results Reviewed:   Imaging Personally Reviewed:  Electrocardiogram Personally Reviewed:    COORDINATION OF CARE:  Care Discussed with Consultants/Other Providers [Y/N]:  Prior or Outpatient Records Reviewed [Y/N]:   PROGRESS NOTE:     89 y/o female w/ PMHx chronic Afib on Xarelto w/ PPM, HTN, severe pulmonary HTN, hypothyroidism, and dementia who admitteds for urinary frequency, urgency, and dysuria, along with altered mental status. On Ceftriaxone 1g daily.  On bedside evaluation patient is improving. Reports improvement in burning during urination but continues to have increased frequency which has not completely resolved. Mentation has returned to baseline.      SUBJECTIVE / OVERNIGHT EVENTS:    MEDICATIONS  (STANDING):  ascorbic acid 1000 milliGRAM(s) Oral daily  carvedilol 3.125 milliGRAM(s) Oral every 12 hours  cefTRIAXone   IVPB 1000 milliGRAM(s) IV Intermittent every 24 hours  cholecalciferol 5000 Unit(s) Oral daily  folic acid 1 milliGRAM(s) Oral daily  levothyroxine 75 MICROGram(s) Oral daily  multivitamin 1 Tablet(s) Oral daily  rivaroxaban 20 milliGRAM(s) Oral with dinner  spironolactone 25 milliGRAM(s) Oral every 24 hours    MEDICATIONS  (PRN):  acetaminophen     Tablet .. 650 milliGRAM(s) Oral every 6 hours PRN Temp greater or equal to 38C (100.4F), Mild Pain (1 - 3)  melatonin 3 milliGRAM(s) Oral at bedtime PRN Insomnia      CAPILLARY BLOOD GLUCOSE        I&O's Summary      PHYSICAL EXAM:  Vital Signs Last 24 Hrs  T(C): 36.9 (09 Oct 2024 05:08), Max: 37.4 (08 Oct 2024 19:33)  T(F): 98.4 (09 Oct 2024 05:08), Max: 99.4 (08 Oct 2024 19:33)  HR: 78 (09 Oct 2024 05:08) (74 - 89)  BP: 144/86 (09 Oct 2024 05:08) (125/69 - 159/94)  BP(mean): 108 (09 Oct 2024 00:08) (108 - 108)  RR: 18 (09 Oct 2024 05:08) (16 - 18)  SpO2: 95% (09 Oct 2024 05:08) (94% - 98%)    Parameters below as of 09 Oct 2024 05:08  Patient On (Oxygen Delivery Method): room air        GENERAL: No acute distress, well-developed  HEAD:  Atraumatic, Normocephalic  EYES: EOMI, PERRLA, conjunctiva and sclera clear  NECK: Supple, no lymphadenopathy, no JVD  CHEST/LUNG: CTAB; No wheezes, rales, or rhonchi  HEART: Regular rate and rhythm; No murmurs, rubs, or gallops  ABDOMEN: Soft, non-tender, non-distended; normal bowel sounds, no organomegaly  EXTREMITIES:  2+ peripheral pulses b/l, No clubbing, cyanosis, or edema  NEUROLOGY: A&O x 3, no focal deficits  SKIN: No rashes or lesions    LABS:                        13.5   7.49  )-----------( 224      ( 09 Oct 2024 07:13 )             42.8     10-09    138  |  105  |  17  ----------------------------<  112[H]  4.0   |  22  |  0.51    Ca    10.5      09 Oct 2024 07:13    TPro  6.9  /  Alb  3.7  /  TBili  0.5  /  DBili  x   /  AST  11  /  ALT  9[L]  /  AlkPhos  109  10-09    PT/INR - ( 08 Oct 2024 18:52 )   PT: 14.0 sec;   INR: 1.23 ratio         PTT - ( 08 Oct 2024 18:52 )  PTT:33.2 sec      Urinalysis Basic - ( 09 Oct 2024 07:13 )    Color: x / Appearance: x / SG: x / pH: x  Gluc: 112 mg/dL / Ketone: x  / Bili: x / Urobili: x   Blood: x / Protein: x / Nitrite: x   Leuk Esterase: x / RBC: x / WBC x   Sq Epi: x / Non Sq Epi: x / Bacteria: x          RADIOLOGY & ADDITIONAL TESTS:  Results Reviewed:   Imaging Personally Reviewed:  Electrocardiogram Personally Reviewed:    COORDINATION OF CARE:  Care Discussed with Consultants/Other Providers [Y/N]:  Prior or Outpatient Records Reviewed [Y/N]:

## 2024-10-09 NOTE — PHYSICAL THERAPY INITIAL EVALUATION ADULT - PREDICTED DURATION OF THERAPY (DAYS/WKS), PT EVAL
SpO2 at rest on room air 95%     HR 86  SpO2 with exertion on room air 92    He was able to hold conversation for the duration of the walk lasting 6 minutes   2wks

## 2024-10-09 NOTE — PROGRESS NOTE ADULT - ATTENDING COMMENTS
Pt seen and examined. Limited ROS on account of AMS, cognitive impairment. c/o urge to urinate. Denies abd pain.  On exam, AAOX 2, abd is soft, nt, nd, bs+  Labs reviewed  c/w CTX  f/up bld cx, urine cx  PT eval

## 2024-10-09 NOTE — PHYSICAL THERAPY INITIAL EVALUATION ADULT - NSPTDISCHREC_GEN_A_CORE
if return to home; will need homePT; resumption of 24hr 7day HHA; assist w/ all ADL's; shower chair; ambulance transport into home; 3:1commod/Sub-acute Rehab

## 2024-10-09 NOTE — DISCHARGE NOTE PROVIDER - NSDCMRMEDTOKEN_GEN_ALL_CORE_FT
ascorbic acid 500 mg oral tablet: 2 tab(s) orally once a day  carvedilol 3.125 mg oral tablet: 1 tab(s) orally every 12 hours  cholecalciferol 125 mcg (5000 intl units) oral tablet: 1 tab(s) orally once a day  folic acid 1 mg oral tablet: 1 tab(s) orally once a day  levothyroxine 75 mcg (0.075 mg) oral tablet: 1 tab(s) orally once a day  Multiple Vitamins oral tablet: 1 tab(s) orally once a day  rivaroxaban 20 mg oral tablet: 1 tab(s) orally once a day (before a meal)  spironolactone 25 mg oral tablet: 1 tab(s) orally once a day

## 2024-10-09 NOTE — PHYSICAL THERAPY INITIAL EVALUATION ADULT - PLANNED THERAPY INTERVENTIONS, PT EVAL
STAIR GOAL: Pt will negotiate 16 stairs MinAx1 in 2 wks to improve overall functional mobility./balance training/bed mobility training/gait training/strengthening/transfer training

## 2024-10-09 NOTE — PHYSICAL THERAPY INITIAL EVALUATION ADULT - PERTINENT HX OF CURRENT PROBLEM, REHAB EVAL
This is an 88 y/o female w/ PMHx chronic Afib on Xarelto w/ PPM, HTN, severe pulmonary HTN, hypothyroidism, and dementia who presents for urinary frequency, urgency, and dysuria, along with altered mental status. Symptoms started a day ago. She has been complaining of burning in her lower abdomen when she urinates, having to go to the bathroom more often to urinate. Has been more confused since this AM as well. 24hr aides notified her son, who brought her into the ED for further evaluation. Patient has history of UTIs, was admitted in January this year for UTI 2/2 proteus mirabilis. CXR 10/8: (-). CT ABDOMEN/PELVIS 10/8: Circumferential thickening of the urinary bladder walls with mucosal   hyperenhancement and mild perivesicular fat stranding concerning for cystitis. Correlate with urinalysis. Colonic diverticulosis without acute diverticulitis.  Moderate stool burden.

## 2024-10-09 NOTE — PATIENT PROFILE ADULT - FUNCTIONAL ASSESSMENT - DAILY ACTIVITY SCORE.
Subjective:       Patient ID: Amber Crowell is a 70 y.o. female.    Chief Complaint: Sinusitis (x 1 week) and Diarrhea (x 4 days)    URI    This is a new problem. The current episode started in the past 7 days. The problem has been gradually worsening. There has been no fever. Associated symptoms include congestion, coughing and dysuria. Pertinent negatives include no abdominal pain, chest pain, rash, sinus pain, sore throat, vomiting or wheezing. She has tried nothing for the symptoms. The treatment provided no relief.     Review of Systems   Constitutional: Positive for fatigue. Negative for fever.   HENT: Positive for congestion. Negative for sinus pain and sore throat.    Eyes: Negative for discharge.   Respiratory: Positive for cough. Negative for shortness of breath and wheezing.    Cardiovascular: Negative for chest pain.   Gastrointestinal: Negative for abdominal pain and vomiting.   Genitourinary: Positive for dysuria. Negative for difficulty urinating.   Musculoskeletal: Negative for joint swelling.   Skin: Negative for rash.   Neurological: Negative for dizziness.   Psychiatric/Behavioral: Negative for agitation.       Objective:      Physical Exam   Constitutional: She appears well-developed and well-nourished. No distress.   HENT:   Head: Normocephalic and atraumatic.   Nose: Right sinus exhibits no maxillary sinus tenderness and no frontal sinus tenderness. Left sinus exhibits no maxillary sinus tenderness and no frontal sinus tenderness.   Eyes: Conjunctivae are normal. No scleral icterus.   Cardiovascular: Normal rate and regular rhythm.   Pulmonary/Chest: Effort normal and breath sounds normal. No respiratory distress. She has no wheezes.   Cough on exam   Abdominal: Soft. Bowel sounds are normal. There is no tenderness. There is no guarding.   Neurological: She is alert.   Skin: Skin is warm. No rash noted. She is not diaphoretic. No erythema. No pallor.   Good turgor   Nursing note and vitals  reviewed.      Assessment:       1. Viral upper respiratory tract infection    2. Encounter for hepatitis C screening test for low risk patient    3. Encounter for screening for lipid disorder    4. Dysuria        Plan:     Problem List Items Addressed This Visit        ENT    Viral upper respiratory tract infection - Primary    Relevant Medications    betamethasone acetate-betamethasone sodium phosphate injection 6 mg (Start on 2/17/2020  9:30 AM)    promethazine-dextromethorphan (PROMETHAZINE-DM) 6.25-15 mg/5 mL Syrp       Cardiac/Vascular    Encounter for screening for lipid disorder    Relevant Orders    CBC auto differential    Comprehensive metabolic panel    EKG 12-lead    Lipid panel       Renal/    Dysuria    Relevant Medications    nitrofurantoin, macrocrystal-monohydrate, (MACROBID) 100 MG capsule    Other Relevant Orders    Urinalysis, Reflex to Urine Culture Urine, Clean Catch      Other Visit Diagnoses     Encounter for hepatitis C screening test for low risk patient        Relevant Orders    Hepatitis C antibody         15

## 2024-10-09 NOTE — PHYSICAL THERAPY INITIAL EVALUATION ADULT - GAIT TRAINING, PT EVAL
pt will ambulate 50ft CGA w/ least restrictive device  in 2ks to improve overall functional mobility.

## 2024-10-10 DIAGNOSIS — R53.81 OTHER MALAISE: ICD-10-CM

## 2024-10-10 LAB
ALBUMIN SERPL ELPH-MCNC: 3.9 G/DL — SIGNIFICANT CHANGE UP (ref 3.3–5)
ALP SERPL-CCNC: 112 U/L — SIGNIFICANT CHANGE UP (ref 40–120)
ALT FLD-CCNC: 12 U/L — SIGNIFICANT CHANGE UP (ref 10–45)
ANION GAP SERPL CALC-SCNC: 10 MMOL/L — SIGNIFICANT CHANGE UP (ref 5–17)
AST SERPL-CCNC: 11 U/L — SIGNIFICANT CHANGE UP (ref 10–40)
BILIRUB SERPL-MCNC: 0.5 MG/DL — SIGNIFICANT CHANGE UP (ref 0.2–1.2)
BUN SERPL-MCNC: 16 MG/DL — SIGNIFICANT CHANGE UP (ref 7–23)
CALCIUM SERPL-MCNC: 10.4 MG/DL — SIGNIFICANT CHANGE UP (ref 8.4–10.5)
CHLORIDE SERPL-SCNC: 98 MMOL/L — SIGNIFICANT CHANGE UP (ref 96–108)
CO2 SERPL-SCNC: 26 MMOL/L — SIGNIFICANT CHANGE UP (ref 22–31)
CREAT SERPL-MCNC: 0.51 MG/DL — SIGNIFICANT CHANGE UP (ref 0.5–1.3)
EGFR: 90 ML/MIN/1.73M2 — SIGNIFICANT CHANGE UP
GLUCOSE SERPL-MCNC: 110 MG/DL — HIGH (ref 70–99)
HCT VFR BLD CALC: 41.8 % — SIGNIFICANT CHANGE UP (ref 34.5–45)
HGB BLD-MCNC: 13 G/DL — SIGNIFICANT CHANGE UP (ref 11.5–15.5)
MCHC RBC-ENTMCNC: 29.3 PG — SIGNIFICANT CHANGE UP (ref 27–34)
MCHC RBC-ENTMCNC: 31.1 GM/DL — LOW (ref 32–36)
MCV RBC AUTO: 94.1 FL — SIGNIFICANT CHANGE UP (ref 80–100)
NRBC # BLD: 0 /100 WBCS — SIGNIFICANT CHANGE UP (ref 0–0)
PLATELET # BLD AUTO: 206 K/UL — SIGNIFICANT CHANGE UP (ref 150–400)
POTASSIUM SERPL-MCNC: 4.1 MMOL/L — SIGNIFICANT CHANGE UP (ref 3.5–5.3)
POTASSIUM SERPL-SCNC: 4.1 MMOL/L — SIGNIFICANT CHANGE UP (ref 3.5–5.3)
PROT SERPL-MCNC: 7.1 G/DL — SIGNIFICANT CHANGE UP (ref 6–8.3)
RBC # BLD: 4.44 M/UL — SIGNIFICANT CHANGE UP (ref 3.8–5.2)
RBC # FLD: 12.7 % — SIGNIFICANT CHANGE UP (ref 10.3–14.5)
SODIUM SERPL-SCNC: 134 MMOL/L — LOW (ref 135–145)
WBC # BLD: 8.81 K/UL — SIGNIFICANT CHANGE UP (ref 3.8–10.5)
WBC # FLD AUTO: 8.81 K/UL — SIGNIFICANT CHANGE UP (ref 3.8–10.5)

## 2024-10-10 PROCEDURE — 99233 SBSQ HOSP IP/OBS HIGH 50: CPT

## 2024-10-10 RX ADMIN — SPIRONOLACTONE 25 MILLIGRAM(S): 100 TABLET, FILM COATED ORAL at 10:23

## 2024-10-10 RX ADMIN — Medication 100 MILLIGRAM(S): at 22:13

## 2024-10-10 RX ADMIN — Medication 75 MICROGRAM(S): at 05:48

## 2024-10-10 RX ADMIN — RIVAROXABAN 20 MILLIGRAM(S): 10 TABLET, FILM COATED ORAL at 18:02

## 2024-10-10 RX ADMIN — Medication 3.12 MILLIGRAM(S): at 22:13

## 2024-10-10 RX ADMIN — Medication 3.12 MILLIGRAM(S): at 10:23

## 2024-10-10 NOTE — PROGRESS NOTE ADULT - ATTENDING COMMENTS
Pt seen and examined.   vitals labs and imaging reviewed   denied abd pain and no tenderness on exam   axo x 2 (knew she was in the hospital, did not know year), moving all extremities    c/w CTX  f/up bld cx, urine cx  PT eval recc Dandy

## 2024-10-10 NOTE — PROGRESS NOTE ADULT - SUBJECTIVE AND OBJECTIVE BOX
PROGRESS NOTE:   Authored by Nnamdi Leal MD  Pager [phone]  Pager CHK [phone]    Patient is a 89y old Female withw/ PMHx chronic Afib on Xarelto w/ PPM, HTN, severe pulmonary HTN, hypothyroidism, and dementia  who was  admitted with altered mental status with- Urinary tract infection suggested with symptoms and UA, and cystitis on imaging.  She is being treated for UTI with 1 g ceftriaxone for UTI.   Mentation has returned to baseline.   On bedside evaluation,patient is pleasant and mentation wise at baseline.  She states she is doing better. Urgency, Frequency and Burning have improved.  Physical Therapy consulted yesterday and patient encouraged to continue to work with PT to help her with debility.    SUBJECTIVE / OVERNIGHT EVENTS:    MEDICATIONS  (STANDING):  ascorbic acid 1000 milliGRAM(s) Oral daily  carvedilol 3.125 milliGRAM(s) Oral every 12 hours  cefTRIAXone   IVPB 1000 milliGRAM(s) IV Intermittent every 24 hours  cholecalciferol 5000 Unit(s) Oral daily  folic acid 1 milliGRAM(s) Oral daily  levothyroxine 75 MICROGram(s) Oral daily  multivitamin 1 Tablet(s) Oral daily  rivaroxaban 20 milliGRAM(s) Oral with dinner  spironolactone 25 milliGRAM(s) Oral every 24 hours    MEDICATIONS  (PRN):  acetaminophen     Tablet .. 650 milliGRAM(s) Oral every 6 hours PRN Temp greater or equal to 38C (100.4F), Mild Pain (1 - 3)  melatonin 3 milliGRAM(s) Oral at bedtime PRN Insomnia      CAPILLARY BLOOD GLUCOSE        I&O's Summary    09 Oct 2024 07:01  -  10 Oct 2024 07:00  --------------------------------------------------------  IN: 640 mL / OUT: 650 mL / NET: -10 mL        PHYSICAL EXAM:  Vital Signs Last 24 Hrs  T(C): 36.5 (10 Oct 2024 11:23), Max: 36.9 (09 Oct 2024 19:12)  T(F): 97.7 (10 Oct 2024 11:23), Max: 98.4 (09 Oct 2024 19:12)  HR: 79 (10 Oct 2024 11:23) (75 - 90)  BP: 125/84 (10 Oct 2024 11:23) (109/74 - 129/82)  BP(mean): --  RR: 18 (10 Oct 2024 11:23) (18 - 18)  SpO2: 94% (10 Oct 2024 11:23) (94% - 99%)    Parameters below as of 10 Oct 2024 11:23  Patient On (Oxygen Delivery Method): room air        GENERAL: No acute distress, well-developed  HEAD:  Atraumatic, Normocephalic  EYES: EOMI, PERRLA, conjunctiva and sclera clear  NECK: Supple, no lymphadenopathy, no JVD  CHEST/LUNG: CTAB; No wheezes, rales, or rhonchi  HEART: Regular rate and rhythm; No murmurs, rubs, or gallops  ABDOMEN: Soft, non-tender, non-distended; normal bowel sounds, no organomegaly  EXTREMITIES:  2+ peripheral pulses b/l, No clubbing, cyanosis, or edema  NEUROLOGY: A&O x 3, no focal deficits  SKIN: No rashes or lesions    LABS:                        13.5   7.49  )-----------( 224      ( 09 Oct 2024 07:13 )             42.8     10-09    138  |  105  |  17  ----------------------------<  112[H]  4.0   |  22  |  0.51    Ca    10.5      09 Oct 2024 07:13    TPro  6.9  /  Alb  3.7  /  TBili  0.5  /  DBili  x   /  AST  11  /  ALT  9[L]  /  AlkPhos  109  10-09    PT/INR - ( 08 Oct 2024 18:52 )   PT: 14.0 sec;   INR: 1.23 ratio         PTT - ( 08 Oct 2024 18:52 )  PTT:33.2 sec      Urinalysis Basic - ( 09 Oct 2024 07:13 )    Color: x / Appearance: x / SG: x / pH: x  Gluc: 112 mg/dL / Ketone: x  / Bili: x / Urobili: x   Blood: x / Protein: x / Nitrite: x   Leuk Esterase: x / RBC: x / WBC x   Sq Epi: x / Non Sq Epi: x / Bacteria: x        Culture - Blood (collected 08 Oct 2024 18:50)  Source: .Blood BLOOD  Preliminary Report (09 Oct 2024 22:01):    No growth at 24 hours    Culture - Blood (collected 08 Oct 2024 18:45)  Source: .Blood BLOOD  Preliminary Report (09 Oct 2024 22:01):    No growth at 24 hours        RADIOLOGY & ADDITIONAL TESTS:  Results Reviewed:   Imaging Personally Reviewed:  Electrocardiogram Personally Reviewed:    COORDINATION OF CARE:  Care Discussed with Consultants/Other Providers [Y/N]:  Prior or Outpatient Records Reviewed [Y/N]:

## 2024-10-11 LAB
-  AMOXICILLIN/CLAVULANIC ACID: SIGNIFICANT CHANGE UP
-  AMPICILLIN/SULBACTAM: SIGNIFICANT CHANGE UP
-  AMPICILLIN: SIGNIFICANT CHANGE UP
-  AZTREONAM: SIGNIFICANT CHANGE UP
-  CEFAZOLIN: SIGNIFICANT CHANGE UP
-  CEFEPIME: SIGNIFICANT CHANGE UP
-  CEFOXITIN: SIGNIFICANT CHANGE UP
-  CEFTRIAXONE: SIGNIFICANT CHANGE UP
-  CEFUROXIME: SIGNIFICANT CHANGE UP
-  CIPROFLOXACIN: SIGNIFICANT CHANGE UP
-  ERTAPENEM: SIGNIFICANT CHANGE UP
-  GENTAMICIN: SIGNIFICANT CHANGE UP
-  LEVOFLOXACIN: SIGNIFICANT CHANGE UP
-  MEROPENEM: SIGNIFICANT CHANGE UP
-  NITROFURANTOIN: SIGNIFICANT CHANGE UP
-  PIPERACILLIN/TAZOBACTAM: SIGNIFICANT CHANGE UP
-  TOBRAMYCIN: SIGNIFICANT CHANGE UP
-  TRIMETHOPRIM/SULFAMETHOXAZOLE: SIGNIFICANT CHANGE UP
METHOD TYPE: SIGNIFICANT CHANGE UP

## 2024-10-11 PROCEDURE — 99232 SBSQ HOSP IP/OBS MODERATE 35: CPT

## 2024-10-11 RX ADMIN — Medication 75 MICROGRAM(S): at 06:30

## 2024-10-11 RX ADMIN — Medication 3.12 MILLIGRAM(S): at 10:18

## 2024-10-11 RX ADMIN — Medication 5000 UNIT(S): at 13:40

## 2024-10-11 RX ADMIN — MULTI VITAMIN/MINERAL SUPPLEMENT WITH ASCORBIC ACID, NIACIN, PYRIDOXINE, PANTOTHENIC ACID, FOLIC ACID, RIBOFLAVIN, THIAMIN, BIOTIN, COBALAMIN AND ZINC. 1 TABLET(S): 60; 20; 12.5; 10; 10; 1.7; 1.5; 1; .3; .006 TABLET, COATED ORAL at 13:40

## 2024-10-11 RX ADMIN — SPIRONOLACTONE 25 MILLIGRAM(S): 100 TABLET, FILM COATED ORAL at 10:18

## 2024-10-11 RX ADMIN — Medication 3.12 MILLIGRAM(S): at 22:23

## 2024-10-11 RX ADMIN — FOLIC ACID 1 MILLIGRAM(S): 1 TABLET ORAL at 13:40

## 2024-10-11 RX ADMIN — Medication 1000 MILLIGRAM(S): at 13:41

## 2024-10-11 RX ADMIN — RIVAROXABAN 20 MILLIGRAM(S): 10 TABLET, FILM COATED ORAL at 18:08

## 2024-10-11 NOTE — PROGRESS NOTE ADULT - SUBJECTIVE AND OBJECTIVE BOX
Patient is a 89y old  Female who presents with a chief complaint of UTI (10 Oct 2024 12:07)      SUBJECTIVE / OVERNIGHT EVENTS:  Pt seen and examined. No acute events overnight. Limited ROS on account of somnolence. She denies any acute c/o.    MEDICATIONS  (STANDING):  ascorbic acid 1000 milliGRAM(s) Oral daily  carvedilol 3.125 milliGRAM(s) Oral every 12 hours  cefTRIAXone   IVPB 1000 milliGRAM(s) IV Intermittent every 24 hours  cholecalciferol 5000 Unit(s) Oral daily  folic acid 1 milliGRAM(s) Oral daily  levothyroxine 75 MICROGram(s) Oral daily  multivitamin 1 Tablet(s) Oral daily  rivaroxaban 20 milliGRAM(s) Oral with dinner  spironolactone 25 milliGRAM(s) Oral every 24 hours    MEDICATIONS  (PRN):  acetaminophen     Tablet .. 650 milliGRAM(s) Oral every 6 hours PRN Temp greater or equal to 38C (100.4F), Mild Pain (1 - 3)  melatonin 3 milliGRAM(s) Oral at bedtime PRN Insomnia      Vital Signs Last 24 Hrs  T(C): 36.7 (11 Oct 2024 12:04), Max: 37.1 (11 Oct 2024 00:42)  T(F): 98 (11 Oct 2024 12:04), Max: 98.7 (11 Oct 2024 00:42)  HR: 73 (11 Oct 2024 12:04) (72 - 90)  BP: 113/75 (11 Oct 2024 12:04) (113/75 - 146/87)  BP(mean): --  RR: 18 (11 Oct 2024 12:04) (18 - 18)  SpO2: 95% (11 Oct 2024 12:04) (94% - 95%)    Parameters below as of 11 Oct 2024 12:04  Patient On (Oxygen Delivery Method): room air      CAPILLARY BLOOD GLUCOSE        I&O's Summary    10 Oct 2024 07:01  -  11 Oct 2024 07:00  --------------------------------------------------------  IN: 870 mL / OUT: 2300 mL / NET: -1430 mL        PHYSICAL EXAM:  GENERAL: No acute distress, well-groomed  HEAD:  Atraumatic, Normocephalic  EYES:  conjunctiva and sclera clear  NECK: Supple, no JVD  CHEST/LUNG: CTAB; No wheezes, rales, or rhonchi  HEART: Regular rate and rhythm; No murmurs, rubs, or gallops  ABDOMEN: Soft, non-tender, non-distended; normal bowel sounds, no organomegaly  EXTREMITIES:  2+ peripheral pulses b/l, No clubbing, cyanosis, or edema  NEUROLOGY: somnolent but arousable, oriented x 2,  no focal deficits  SKIN: No rashes or lesions    LABS:                        13.0   8.81  )-----------( 206      ( 10 Oct 2024 12:39 )             41.8     10-10    134[L]  |  98  |  16  ----------------------------<  110[H]  4.1   |  26  |  0.51    Ca    10.4      10 Oct 2024 12:39    TPro  7.1  /  Alb  3.9  /  TBili  0.5  /  DBili  x   /  AST  11  /  ALT  12  /  AlkPhos  112  10-10          Urinalysis Basic - ( 10 Oct 2024 12:39 )    Color: x / Appearance: x / SG: x / pH: x  Gluc: 110 mg/dL / Ketone: x  / Bili: x / Urobili: x   Blood: x / Protein: x / Nitrite: x   Leuk Esterase: x / RBC: x / WBC x   Sq Epi: x / Non Sq Epi: x / Bacteria: x

## 2024-10-12 PROCEDURE — 99232 SBSQ HOSP IP/OBS MODERATE 35: CPT

## 2024-10-12 RX ADMIN — FOLIC ACID 1 MILLIGRAM(S): 1 TABLET ORAL at 12:15

## 2024-10-12 RX ADMIN — Medication 3.12 MILLIGRAM(S): at 21:19

## 2024-10-12 RX ADMIN — Medication 1000 MILLIGRAM(S): at 12:16

## 2024-10-12 RX ADMIN — MULTI VITAMIN/MINERAL SUPPLEMENT WITH ASCORBIC ACID, NIACIN, PYRIDOXINE, PANTOTHENIC ACID, FOLIC ACID, RIBOFLAVIN, THIAMIN, BIOTIN, COBALAMIN AND ZINC. 1 TABLET(S): 60; 20; 12.5; 10; 10; 1.7; 1.5; 1; .3; .006 TABLET, COATED ORAL at 12:15

## 2024-10-12 RX ADMIN — RIVAROXABAN 20 MILLIGRAM(S): 10 TABLET, FILM COATED ORAL at 17:37

## 2024-10-12 RX ADMIN — SPIRONOLACTONE 25 MILLIGRAM(S): 100 TABLET, FILM COATED ORAL at 08:57

## 2024-10-12 RX ADMIN — Medication 3.12 MILLIGRAM(S): at 09:01

## 2024-10-12 RX ADMIN — Medication 5000 UNIT(S): at 12:16

## 2024-10-12 RX ADMIN — Medication 75 MICROGRAM(S): at 05:24

## 2024-10-12 NOTE — PROGRESS NOTE ADULT - SUBJECTIVE AND OBJECTIVE BOX
Patient is a 89y old  Female who presents with a chief complaint of UTI (11 Oct 2024 13:23)      SUBJECTIVE / OVERNIGHT EVENTS:    Tele reviewed:       ADDITIONAL REVIEW OF SYSTEMS: Negative except for above    MEDICATIONS  (STANDING):  ascorbic acid 1000 milliGRAM(s) Oral daily  carvedilol 3.125 milliGRAM(s) Oral every 12 hours  cholecalciferol 5000 Unit(s) Oral daily  folic acid 1 milliGRAM(s) Oral daily  levothyroxine 75 MICROGram(s) Oral daily  multivitamin 1 Tablet(s) Oral daily  rivaroxaban 20 milliGRAM(s) Oral with dinner  spironolactone 25 milliGRAM(s) Oral every 24 hours    MEDICATIONS  (PRN):  acetaminophen     Tablet .. 650 milliGRAM(s) Oral every 6 hours PRN Temp greater or equal to 38C (100.4F), Mild Pain (1 - 3)  melatonin 3 milliGRAM(s) Oral at bedtime PRN Insomnia      CAPILLARY BLOOD GLUCOSE        I&O's Summary    11 Oct 2024 07:01  -  12 Oct 2024 07:00  --------------------------------------------------------  IN: 480 mL / OUT: 600 mL / NET: -120 mL    12 Oct 2024 07:01  -  12 Oct 2024 15:47  --------------------------------------------------------  IN: 600 mL / OUT: 400 mL / NET: 200 mL        PHYSICAL EXAM:  Vital Signs Last 24 Hrs  T(C): 36.6 (12 Oct 2024 11:26), Max: 37.1 (11 Oct 2024 19:52)  T(F): 97.9 (12 Oct 2024 11:26), Max: 98.7 (11 Oct 2024 19:52)  HR: 75 (12 Oct 2024 11:26) (65 - 93)  BP: 116/78 (12 Oct 2024 11:26) (100/64 - 129/84)  BP(mean): --  RR: 18 (12 Oct 2024 11:26) (18 - 18)  SpO2: 94% (12 Oct 2024 11:26) (94% - 96%)    Parameters below as of 12 Oct 2024 11:26  Patient On (Oxygen Delivery Method): room air        PHYSICAL EXAM:  GENERAL: NAD, well-developed  CHEST/LUNG: Clear to auscultation bilaterally; No wheeze  HEART: Regular rate and rhythm; No murmurs, rubs, or gallops  ABDOMEN: Soft, Nontender, Nondistended; Bowel sounds present        LABS:                      RADIOLOGY & ADDITIONAL TESTS:    Imaging Personally Reviewed:    Electrocardiogram Personally Reviewed:    COORDINATION OF CARE:  Care Discussed with Consultants/Other Providers [Y/N]:  Prior or Outpatient Records Reviewed [Y/N]:     Patient is a 89y old  Female who presents with a chief complaint of UTI (11 Oct 2024 13:23)      SUBJECTIVE / OVERNIGHT EVENTS:  No overnight events         MEDICATIONS  (STANDING):  ascorbic acid 1000 milliGRAM(s) Oral daily  carvedilol 3.125 milliGRAM(s) Oral every 12 hours  cholecalciferol 5000 Unit(s) Oral daily  folic acid 1 milliGRAM(s) Oral daily  levothyroxine 75 MICROGram(s) Oral daily  multivitamin 1 Tablet(s) Oral daily  rivaroxaban 20 milliGRAM(s) Oral with dinner  spironolactone 25 milliGRAM(s) Oral every 24 hours    MEDICATIONS  (PRN):  acetaminophen     Tablet .. 650 milliGRAM(s) Oral every 6 hours PRN Temp greater or equal to 38C (100.4F), Mild Pain (1 - 3)  melatonin 3 milliGRAM(s) Oral at bedtime PRN Insomnia      CAPILLARY BLOOD GLUCOSE        I&O's Summary    11 Oct 2024 07:01  -  12 Oct 2024 07:00  --------------------------------------------------------  IN: 480 mL / OUT: 600 mL / NET: -120 mL    12 Oct 2024 07:01  -  12 Oct 2024 15:47  --------------------------------------------------------  IN: 600 mL / OUT: 400 mL / NET: 200 mL        PHYSICAL EXAM:  Vital Signs Last 24 Hrs  T(C): 36.6 (12 Oct 2024 11:26), Max: 37.1 (11 Oct 2024 19:52)  T(F): 97.9 (12 Oct 2024 11:26), Max: 98.7 (11 Oct 2024 19:52)  HR: 75 (12 Oct 2024 11:26) (65 - 93)  BP: 116/78 (12 Oct 2024 11:26) (100/64 - 129/84)  BP(mean): --  RR: 18 (12 Oct 2024 11:26) (18 - 18)  SpO2: 94% (12 Oct 2024 11:26) (94% - 96%)    Parameters below as of 12 Oct 2024 11:26  Patient On (Oxygen Delivery Method): room air        PHYSICAL EXAM:  GENERAL: NAD, well-developed  CHEST/LUNG: Clear to auscultation bilaterally; No wheeze  HEART: Regular rate and rhythm; No murmurs, rubs, or gallops  ABDOMEN: Soft, Nontender, Nondistended; Bowel sounds present  Neuro : awake and responsive       LABS:  None                     RADIOLOGY & ADDITIONAL TESTS:    Imaging Personally Reviewed:    Electrocardiogram Personally Reviewed:    COORDINATION OF CARE:  Care Discussed with Consultants/Other Providers [Y/N]:  Prior or Outpatient Records Reviewed [Y/N]:

## 2024-10-13 LAB
CULTURE RESULTS: SIGNIFICANT CHANGE UP
CULTURE RESULTS: SIGNIFICANT CHANGE UP
SPECIMEN SOURCE: SIGNIFICANT CHANGE UP
SPECIMEN SOURCE: SIGNIFICANT CHANGE UP

## 2024-10-13 PROCEDURE — 99232 SBSQ HOSP IP/OBS MODERATE 35: CPT

## 2024-10-13 RX ADMIN — Medication 75 MICROGRAM(S): at 05:13

## 2024-10-13 RX ADMIN — Medication 5000 UNIT(S): at 16:16

## 2024-10-13 RX ADMIN — Medication 1000 MILLIGRAM(S): at 11:41

## 2024-10-13 RX ADMIN — Medication 3.12 MILLIGRAM(S): at 11:41

## 2024-10-13 RX ADMIN — RIVAROXABAN 20 MILLIGRAM(S): 10 TABLET, FILM COATED ORAL at 16:17

## 2024-10-13 RX ADMIN — MULTI VITAMIN/MINERAL SUPPLEMENT WITH ASCORBIC ACID, NIACIN, PYRIDOXINE, PANTOTHENIC ACID, FOLIC ACID, RIBOFLAVIN, THIAMIN, BIOTIN, COBALAMIN AND ZINC. 1 TABLET(S): 60; 20; 12.5; 10; 10; 1.7; 1.5; 1; .3; .006 TABLET, COATED ORAL at 11:42

## 2024-10-13 RX ADMIN — SPIRONOLACTONE 25 MILLIGRAM(S): 100 TABLET, FILM COATED ORAL at 11:41

## 2024-10-13 RX ADMIN — Medication 3.12 MILLIGRAM(S): at 21:06

## 2024-10-13 RX ADMIN — FOLIC ACID 1 MILLIGRAM(S): 1 TABLET ORAL at 11:42

## 2024-10-13 NOTE — PROGRESS NOTE ADULT - SUBJECTIVE AND OBJECTIVE BOX
Patient is a 89y old  Female who presents with a chief complaint of UTI (12 Oct 2024 15:47)      SUBJECTIVE / OVERNIGHT EVENTS:    Tele reviewed:       ADDITIONAL REVIEW OF SYSTEMS: Negative except for above    MEDICATIONS  (STANDING):  ascorbic acid 1000 milliGRAM(s) Oral daily  carvedilol 3.125 milliGRAM(s) Oral every 12 hours  cholecalciferol 5000 Unit(s) Oral daily  folic acid 1 milliGRAM(s) Oral daily  levothyroxine 75 MICROGram(s) Oral daily  multivitamin 1 Tablet(s) Oral daily  rivaroxaban 20 milliGRAM(s) Oral with dinner  spironolactone 25 milliGRAM(s) Oral every 24 hours    MEDICATIONS  (PRN):  acetaminophen     Tablet .. 650 milliGRAM(s) Oral every 6 hours PRN Temp greater or equal to 38C (100.4F), Mild Pain (1 - 3)  melatonin 3 milliGRAM(s) Oral at bedtime PRN Insomnia      CAPILLARY BLOOD GLUCOSE        I&O's Summary    12 Oct 2024 07:01  -  13 Oct 2024 07:00  --------------------------------------------------------  IN: 990 mL / OUT: 1150 mL / NET: -160 mL    13 Oct 2024 07:01  -  13 Oct 2024 15:49  --------------------------------------------------------  IN: 420 mL / OUT: 800 mL / NET: -380 mL        PHYSICAL EXAM:  Vital Signs Last 24 Hrs  T(C): 36.7 (13 Oct 2024 15:36), Max: 36.7 (13 Oct 2024 00:11)  T(F): 98.1 (13 Oct 2024 15:36), Max: 98.1 (13 Oct 2024 11:19)  HR: 85 (13 Oct 2024 15:36) (72 - 89)  BP: 138/94 (13 Oct 2024 15:36) (111/71 - 138/94)  BP(mean): --  RR: 18 (13 Oct 2024 15:36) (18 - 18)  SpO2: 94% (13 Oct 2024 15:36) (94% - 96%)    Parameters below as of 13 Oct 2024 15:36  Patient On (Oxygen Delivery Method): room air        PHYSICAL EXAM:  GENERAL: NAD, well-developed  CHEST/LUNG: Clear to auscultation bilaterally; No wheeze  HEART: Regular rate and rhythm; No murmurs, rubs, or gallops  ABDOMEN: Soft, Nontender, Nondistended; Bowel sounds present  Neuro : awake and responsive       LABS:                      RADIOLOGY & ADDITIONAL TESTS:    Imaging Personally Reviewed:    Electrocardiogram Personally Reviewed:    COORDINATION OF CARE:  Care Discussed with Consultants/Other Providers [Y/N]:  Prior or Outpatient Records Reviewed [Y/N]:     Patient is a 89y old  Female who presents with a chief complaint of UTI (12 Oct 2024 15:47)      SUBJECTIVE / OVERNIGHT EVENTS:  NO overnight events         MEDICATIONS  (STANDING):  ascorbic acid 1000 milliGRAM(s) Oral daily  carvedilol 3.125 milliGRAM(s) Oral every 12 hours  cholecalciferol 5000 Unit(s) Oral daily  folic acid 1 milliGRAM(s) Oral daily  levothyroxine 75 MICROGram(s) Oral daily  multivitamin 1 Tablet(s) Oral daily  rivaroxaban 20 milliGRAM(s) Oral with dinner  spironolactone 25 milliGRAM(s) Oral every 24 hours    MEDICATIONS  (PRN):  acetaminophen     Tablet .. 650 milliGRAM(s) Oral every 6 hours PRN Temp greater or equal to 38C (100.4F), Mild Pain (1 - 3)  melatonin 3 milliGRAM(s) Oral at bedtime PRN Insomnia      CAPILLARY BLOOD GLUCOSE        I&O's Summary    12 Oct 2024 07:01  -  13 Oct 2024 07:00  --------------------------------------------------------  IN: 990 mL / OUT: 1150 mL / NET: -160 mL    13 Oct 2024 07:01  -  13 Oct 2024 15:49  --------------------------------------------------------  IN: 420 mL / OUT: 800 mL / NET: -380 mL        PHYSICAL EXAM:  Vital Signs Last 24 Hrs  T(C): 36.7 (13 Oct 2024 15:36), Max: 36.7 (13 Oct 2024 00:11)  T(F): 98.1 (13 Oct 2024 15:36), Max: 98.1 (13 Oct 2024 11:19)  HR: 85 (13 Oct 2024 15:36) (72 - 89)  BP: 138/94 (13 Oct 2024 15:36) (111/71 - 138/94)  BP(mean): --  RR: 18 (13 Oct 2024 15:36) (18 - 18)  SpO2: 94% (13 Oct 2024 15:36) (94% - 96%)    Parameters below as of 13 Oct 2024 15:36  Patient On (Oxygen Delivery Method): room air        PHYSICAL EXAM:  GENERAL: NAD, well-developed  CHEST/LUNG: Clear to auscultation bilaterally; No wheeze  HEART: Regular rate and rhythm; No murmurs, rubs, or gallops  ABDOMEN: Soft, Nontender, Nondistended; Bowel sounds present  Neuro :  AAO X2       LABS:                      RADIOLOGY & ADDITIONAL TESTS:    Imaging Personally Reviewed:    Electrocardiogram Personally Reviewed:    COORDINATION OF CARE:  Care Discussed with Consultants/Other Providers [Y/N]:  Prior or Outpatient Records Reviewed [Y/N]:

## 2024-10-14 PROCEDURE — 99239 HOSP IP/OBS DSCHRG MGMT >30: CPT

## 2024-10-14 RX ORDER — SENNOSIDES 8.6 MG
2 TABLET ORAL AT BEDTIME
Refills: 0 | Status: DISCONTINUED | OUTPATIENT
Start: 2024-10-14 | End: 2024-10-15

## 2024-10-14 RX ADMIN — Medication 17 GRAM(S): at 11:56

## 2024-10-14 RX ADMIN — Medication 1000 MILLIGRAM(S): at 11:56

## 2024-10-14 RX ADMIN — Medication 3.12 MILLIGRAM(S): at 05:19

## 2024-10-14 RX ADMIN — RIVAROXABAN 20 MILLIGRAM(S): 10 TABLET, FILM COATED ORAL at 16:42

## 2024-10-14 RX ADMIN — Medication 3.12 MILLIGRAM(S): at 21:12

## 2024-10-14 RX ADMIN — Medication 5000 UNIT(S): at 11:56

## 2024-10-14 RX ADMIN — SPIRONOLACTONE 25 MILLIGRAM(S): 100 TABLET, FILM COATED ORAL at 05:19

## 2024-10-14 RX ADMIN — MULTI VITAMIN/MINERAL SUPPLEMENT WITH ASCORBIC ACID, NIACIN, PYRIDOXINE, PANTOTHENIC ACID, FOLIC ACID, RIBOFLAVIN, THIAMIN, BIOTIN, COBALAMIN AND ZINC. 1 TABLET(S): 60; 20; 12.5; 10; 10; 1.7; 1.5; 1; .3; .006 TABLET, COATED ORAL at 11:57

## 2024-10-14 RX ADMIN — Medication 75 MICROGRAM(S): at 05:19

## 2024-10-14 RX ADMIN — FOLIC ACID 1 MILLIGRAM(S): 1 TABLET ORAL at 11:56

## 2024-10-14 RX ADMIN — Medication 3 MILLIGRAM(S): at 21:13

## 2024-10-14 RX ADMIN — Medication 2 TABLET(S): at 21:12

## 2024-10-14 NOTE — PROGRESS NOTE ADULT - TIME BILLING
case complexity and discharge planning. Pt is clinically stable for discharge to [facility]. She is to f/up with PCP in 2 weeks.    d/w ACP Violette
reviewing tests and imaging, independently obtaining a history, performing a physical examination, discussing the plan with the patient, ordering medications/tests, documenting clinical information, and coordinating care. This excludes any time spent on teaching.
reviewing tests and imaging, independently obtaining a history, performing a physical examination, discussing the plan with the patient, ordering medications/tests, documenting clinical information, and coordinating care. This excludes any time spent on teaching.

## 2024-10-14 NOTE — PROGRESS NOTE ADULT - SUBJECTIVE AND OBJECTIVE BOX
Patient is a 89y old  Female who presents with a chief complaint of UTI (13 Oct 2024 15:49)      SUBJECTIVE / OVERNIGHT EVENTS:  Pt seen and examined. No acute events overnight. She denies CP, SOB, urinary symptoms.    MEDICATIONS  (STANDING):  ascorbic acid 1000 milliGRAM(s) Oral daily  carvedilol 3.125 milliGRAM(s) Oral every 12 hours  cholecalciferol 5000 Unit(s) Oral daily  folic acid 1 milliGRAM(s) Oral daily  levothyroxine 75 MICROGram(s) Oral daily  multivitamin 1 Tablet(s) Oral daily  polyethylene glycol 3350 17 Gram(s) Oral daily  rivaroxaban 20 milliGRAM(s) Oral with dinner  senna 2 Tablet(s) Oral at bedtime  spironolactone 25 milliGRAM(s) Oral every 24 hours    MEDICATIONS  (PRN):  acetaminophen     Tablet .. 650 milliGRAM(s) Oral every 6 hours PRN Temp greater or equal to 38C (100.4F), Mild Pain (1 - 3)  melatonin 3 milliGRAM(s) Oral at bedtime PRN Insomnia      Vital Signs Last 24 Hrs  T(C): 36.6 (14 Oct 2024 09:37), Max: 36.8 (13 Oct 2024 19:20)  T(F): 97.8 (14 Oct 2024 09:37), Max: 98.3 (13 Oct 2024 19:20)  HR: 78 (14 Oct 2024 09:37) (78 - 98)  BP: 151/84 (14 Oct 2024 09:37) (135/82 - 168/100)  BP(mean): --  RR: 18 (14 Oct 2024 09:37) (18 - 18)  SpO2: 96% (14 Oct 2024 09:37) (92% - 98%)    Parameters below as of 14 Oct 2024 09:37  Patient On (Oxygen Delivery Method): room air      CAPILLARY BLOOD GLUCOSE        I&O's Summary    13 Oct 2024 07:01  -  14 Oct 2024 07:00  --------------------------------------------------------  IN: 600 mL / OUT: 2050 mL / NET: -1450 mL    14 Oct 2024 07:01  -  14 Oct 2024 12:27  --------------------------------------------------------  IN: 240 mL / OUT: 300 mL / NET: -60 mL        PHYSICAL EXAM:  GENERAL: No acute distress, well-groomed  HEAD:  Atraumatic, Normocephalic  EYES:  conjunctiva and sclera clear  NECK: Supple, no JVD  CHEST/LUNG: CTAB; No wheezes, rales, or rhonchi  HEART: Regular rate and rhythm; No murmurs, rubs, or gallops  ABDOMEN: Soft, non-tender, non-distended; normal bowel sounds, no organomegaly  EXTREMITIES:  2+ peripheral pulses b/l, No clubbing, cyanosis, or edema  NEUROLOGY:  AAOX 2,  no focal deficits  SKIN: No rashes or lesions

## 2024-10-15 ENCOUNTER — TRANSCRIPTION ENCOUNTER (OUTPATIENT)
Age: 89
End: 2024-10-15

## 2024-10-15 VITALS
HEART RATE: 71 BPM | DIASTOLIC BLOOD PRESSURE: 80 MMHG | SYSTOLIC BLOOD PRESSURE: 133 MMHG | OXYGEN SATURATION: 97 % | TEMPERATURE: 98 F | RESPIRATION RATE: 18 BRPM

## 2024-10-15 PROCEDURE — 87186 SC STD MICRODIL/AGAR DIL: CPT

## 2024-10-15 PROCEDURE — 99285 EMERGENCY DEPT VISIT HI MDM: CPT | Mod: 25

## 2024-10-15 PROCEDURE — 82435 ASSAY OF BLOOD CHLORIDE: CPT

## 2024-10-15 PROCEDURE — 85610 PROTHROMBIN TIME: CPT

## 2024-10-15 PROCEDURE — 84132 ASSAY OF SERUM POTASSIUM: CPT

## 2024-10-15 PROCEDURE — 80053 COMPREHEN METABOLIC PANEL: CPT

## 2024-10-15 PROCEDURE — 83605 ASSAY OF LACTIC ACID: CPT

## 2024-10-15 PROCEDURE — 83690 ASSAY OF LIPASE: CPT

## 2024-10-15 PROCEDURE — 87077 CULTURE AEROBIC IDENTIFY: CPT

## 2024-10-15 PROCEDURE — 85018 HEMOGLOBIN: CPT

## 2024-10-15 PROCEDURE — 82947 ASSAY GLUCOSE BLOOD QUANT: CPT

## 2024-10-15 PROCEDURE — 93005 ELECTROCARDIOGRAM TRACING: CPT

## 2024-10-15 PROCEDURE — 97116 GAIT TRAINING THERAPY: CPT

## 2024-10-15 PROCEDURE — 87086 URINE CULTURE/COLONY COUNT: CPT

## 2024-10-15 PROCEDURE — 85025 COMPLETE CBC W/AUTO DIFF WBC: CPT

## 2024-10-15 PROCEDURE — 81001 URINALYSIS AUTO W/SCOPE: CPT

## 2024-10-15 PROCEDURE — 97161 PT EVAL LOW COMPLEX 20 MIN: CPT

## 2024-10-15 PROCEDURE — 87637 SARSCOV2&INF A&B&RSV AMP PRB: CPT

## 2024-10-15 PROCEDURE — 82803 BLOOD GASES ANY COMBINATION: CPT

## 2024-10-15 PROCEDURE — 97110 THERAPEUTIC EXERCISES: CPT

## 2024-10-15 PROCEDURE — 74177 CT ABD & PELVIS W/CONTRAST: CPT | Mod: MC

## 2024-10-15 PROCEDURE — 85730 THROMBOPLASTIN TIME PARTIAL: CPT

## 2024-10-15 PROCEDURE — 85014 HEMATOCRIT: CPT

## 2024-10-15 PROCEDURE — 84295 ASSAY OF SERUM SODIUM: CPT

## 2024-10-15 PROCEDURE — 87040 BLOOD CULTURE FOR BACTERIA: CPT

## 2024-10-15 PROCEDURE — 99232 SBSQ HOSP IP/OBS MODERATE 35: CPT

## 2024-10-15 PROCEDURE — 71045 X-RAY EXAM CHEST 1 VIEW: CPT

## 2024-10-15 PROCEDURE — 82330 ASSAY OF CALCIUM: CPT

## 2024-10-15 PROCEDURE — 85027 COMPLETE CBC AUTOMATED: CPT

## 2024-10-15 PROCEDURE — 0241U: CPT

## 2024-10-15 RX ADMIN — SPIRONOLACTONE 25 MILLIGRAM(S): 100 TABLET, FILM COATED ORAL at 05:03

## 2024-10-15 RX ADMIN — Medication 75 MICROGRAM(S): at 05:03

## 2024-10-15 RX ADMIN — Medication 17 GRAM(S): at 11:27

## 2024-10-15 RX ADMIN — Medication 3.12 MILLIGRAM(S): at 05:03

## 2024-10-15 RX ADMIN — MULTI VITAMIN/MINERAL SUPPLEMENT WITH ASCORBIC ACID, NIACIN, PYRIDOXINE, PANTOTHENIC ACID, FOLIC ACID, RIBOFLAVIN, THIAMIN, BIOTIN, COBALAMIN AND ZINC. 1 TABLET(S): 60; 20; 12.5; 10; 10; 1.7; 1.5; 1; .3; .006 TABLET, COATED ORAL at 11:27

## 2024-10-15 RX ADMIN — FOLIC ACID 1 MILLIGRAM(S): 1 TABLET ORAL at 11:27

## 2024-10-15 RX ADMIN — Medication 5000 UNIT(S): at 11:27

## 2024-10-15 RX ADMIN — Medication 1000 MILLIGRAM(S): at 11:28

## 2024-10-15 NOTE — PROGRESS NOTE ADULT - PROBLEM SELECTOR PLAN 4
- C/w Carvedilol 3.125mg BID  - C/w spironolactone 25mg daily.
C/w Carvedilol 3.125mg BID  - C/w spironolactone 25mg daily.
C/w Carvedilol 3.125mg BID  - C/w spironolactone 25mg daily.

## 2024-10-15 NOTE — DISCHARGE NOTE NURSING/CASE MANAGEMENT/SOCIAL WORK - PATIENT PORTAL LINK FT
You can access the FollowMyHealth Patient Portal offered by [facility] by registering at the following website: [email or web address]. By joining [email or web address]’s FollowMyHealth portal, you will also be able to view your health information using other applications (apps) compatible with our system.

## 2024-10-15 NOTE — PROGRESS NOTE ADULT - SUBJECTIVE AND OBJECTIVE BOX
Patient is a 89y old  Female who presents with a chief complaint of UTI (14 Oct 2024 12:27)      SUBJECTIVE / OVERNIGHT EVENTS: Pt seen and examined. No acute events overnight. She denies CP, SOB, urinary symptoms.      MEDICATIONS  (STANDING):  ascorbic acid 1000 milliGRAM(s) Oral daily  carvedilol 3.125 milliGRAM(s) Oral every 12 hours  cholecalciferol 5000 Unit(s) Oral daily  folic acid 1 milliGRAM(s) Oral daily  levothyroxine 75 MICROGram(s) Oral daily  multivitamin 1 Tablet(s) Oral daily  polyethylene glycol 3350 17 Gram(s) Oral daily  rivaroxaban 20 milliGRAM(s) Oral with dinner  senna 2 Tablet(s) Oral at bedtime  spironolactone 25 milliGRAM(s) Oral every 24 hours    MEDICATIONS  (PRN):  acetaminophen     Tablet .. 650 milliGRAM(s) Oral every 6 hours PRN Temp greater or equal to 38C (100.4F), Mild Pain (1 - 3)  melatonin 3 milliGRAM(s) Oral at bedtime PRN Insomnia      Vital Signs Last 24 Hrs  T(C): 36.7 (15 Oct 2024 05:05), Max: 37 (15 Oct 2024 00:34)  T(F): 98 (15 Oct 2024 05:05), Max: 98.6 (15 Oct 2024 00:34)  HR: 79 (15 Oct 2024 05:05) (77 - 96)  BP: 147/84 (15 Oct 2024 05:05) (115/76 - 147/84)  BP(mean): --  RR: 18 (15 Oct 2024 05:05) (18 - 18)  SpO2: 95% (15 Oct 2024 05:05) (92% - 96%)    Parameters below as of 15 Oct 2024 05:05  Patient On (Oxygen Delivery Method): room air      CAPILLARY BLOOD GLUCOSE        I&O's Summary    14 Oct 2024 07:01  -  15 Oct 2024 07:00  --------------------------------------------------------  IN: 480 mL / OUT: 650 mL / NET: -170 mL        PHYSICAL EXAM:  GENERAL: No acute distress, well-groomed  HEAD:  Atraumatic, Normocephalic  EYES:  conjunctiva and sclera clear  NECK: Supple, no JVD  CHEST/LUNG: CTAB; No wheezes, rales, or rhonchi  HEART: Regular rate and rhythm; No murmurs, rubs, or gallops  ABDOMEN: Soft, non-tender, non-distended; normal bowel sounds, no organomegaly  EXTREMITIES:  2+ peripheral pulses b/l, No clubbing, cyanosis, or edema  NEUROLOGY:  AAOX 2,  no focal deficits  SKIN: No rashes or lesions

## 2024-10-15 NOTE — PROGRESS NOTE ADULT - PROBLEM SELECTOR PLAN 2
Likely due to UTI. Much improved  s/p treatment for UTI with Ceftriaxone
Likely due to UTI. Much improved  s/p treatment for UTI with Ceftriaxone
Likely due to UTI. Now Improving  Will continue treatment for UTI with Ceftriaxone
Likely due to UTI. Much improved  s/p treatment for UTI with Ceftriaxone
Likely due to UTI. Now Improving  Will continue treatment for UTI with Ceftriaxone

## 2024-10-15 NOTE — PROGRESS NOTE ADULT - PROBLEM SELECTOR PLAN 3
-c /w Xarelto 20mg daily   - patient hasn't taken a dose since last week due to recurrence of epistaxis,  As reported "son would like to trial restarting in the hospital to monitor if it happens again "   - would discontinue if epistaxis recurs
-c /w Xarelto 20mg daily   - patient hasn't taken a dose since last week due to recurrence of epistaxis,  As reported "son would like to trial restarting in the hospital to monitor if it happens again "   - would discontinue if epistaxis recurs
C/w Xarelto 20mg daily - patient hasn't taken a dose since last week due to recurrence of epistaxis, son would like to trial restarting in the hospital to monitor if it happens again - would discontinue if developing epistaxis again
-c/w Xarelto 20mg daily   - patient hasn't taken a dose since last week due to recurrence of epistaxis,  As reported "son would like to trial restarting in the hospital to monitor if it happens again "   - would discontinue if epistaxis recurs
-c/w Xarelto 20mg daily   - patient hasn't taken a dose since last week due to recurrence of epistaxis,  As reported "son would like to trial restarting in the hospital to monitor if it happens again "   - would discontinue if epistaxis recurs
C/w Xarelto 20mg daily - patient hasn't taken a dose since last week due to recurrence of epistaxis, son would like to trial restarting in the hospital to monitor if it happens again - would discontinue if developing epistaxis again
-c /w Xarelto 20mg daily   - patient hasn't taken a dose since last week due to recurrence of epistaxis, son would like to trial restarting in the hospital to monitor if it happens again   - would discontinue if epistaxis recurs

## 2024-10-15 NOTE — PROGRESS NOTE ADULT - PROBLEM SELECTOR PLAN 1
UA w/ large LE, positive nitrites, >50 WBC, and many bacteria  CT A/P w/ circumferential thickening of the urinary bladder walls with mucosal hyperenhancement and mild perivesicular fat stranding concerning for cystitis  Bld cx NGTD  Urine cx w/Proteus  s/p Ceftriaxone 1 g daily x 3 days
UA w/ large LE, positive nitrites, >50 WBC, and many bacteria  CT A/P w/ circumferential thickening of the urinary bladder walls with mucosal hyperenhancement and mild perivesicular fat stranding concerning for cystitis  Pending Urine cultures and Blood Cultures     Continue Ceftriaxone1 g daily
UA w/ large LE, positive nitrites, >50 WBC, and many bacteria  CT A/P w/ circumferential thickening of the urinary bladder walls with mucosal hyperenhancement and mild perivesicular fat stranding concerning for cystitis  Bld cx NGTD  Urine cx w/Proteus  s/p Ceftriaxone 1 g daily x 3 days
UA w/ large LE, positive nitrites, >50 WBC, and many bacteria  CT A/P w/ circumferential thickening of the urinary bladder walls with mucosal hyperenhancement and mild perivesicular fat stranding concerning for cystitis  Pending Urine cultures and Blood Cultures     Continue Ceftriaxone1 g daily

## 2024-10-15 NOTE — PROGRESS NOTE ADULT - PROBLEM SELECTOR PLAN 7
DVT ppx : pt is on Xarelto  PT reccs DIXIE
Continue with Xarelto

## 2024-10-15 NOTE — PROGRESS NOTE ADULT - ASSESSMENT
88 y/o female w/ PMHx chronic Afib on Xarelto w/ PPM, HTN, severe pulmonary HTN, hypothyroidism, and dementia who presents for urinary frequency, urgency, and dysuria, along with altered mental status. Found to have UA concerning for UTI, CT concerning for cystitis.   
89 y/o female w/ PMHx chronic Afib on Xarelto w/ PPM, HTN, severe pulmonary HTN, hypothyroidism, and dementia who presents for urinary frequency, urgency, and dysuria, along with altered mental status. Found to have UA concerning for UTI, CT concerning for cystitis.   
89 y/o female w/ PMHx chronic Afib on Xarelto w/ PPM, HTN, severe pulmonary HTN, hypothyroidism, and dementia who presents for urinary frequency, urgency, and dysuria, along with altered mental status. Found to have UA concerning for UTI, CT concerning for cystitis.   
88 y/o female w/ PMHx chronic Afib on Xarelto w/ PPM, HTN, severe pulmonary HTN, hypothyroidism, and dementia who presents for urinary frequency, urgency, and dysuria, along with altered mental status. Found to have UA concerning for UTI, CT concerning for cystitis.   
89 y/o female w/ PMHx chronic Afib on Xarelto w/ PPM, HTN, severe pulmonary HTN, hypothyroidism, and dementia who presents for urinary frequency, urgency, and dysuria, along with altered mental status. Found to have UA concerning for UTI, CT concerning for cystitis.   
89 y/o female w/ PMHx chronic Afib on Xarelto w/ PPM, HTN, severe pulmonary HTN, hypothyroidism, and dementia who presents for urinary frequency, urgency, and dysuria, along with altered mental status. Found to have UA concerning for UTI, CT concerning for cystitis.   
88 y/o female w/ PMHx chronic Afib on Xarelto w/ PPM, HTN, severe pulmonary HTN, hypothyroidism, and dementia who presents for urinary frequency, urgency, and dysuria, along with altered mental status. Found to have UA concerning for UTI, CT concerning for cystitis.

## 2024-10-15 NOTE — PROGRESS NOTE ADULT - NSPROGADDITIONALINFOA_GEN_ALL_CORE
Diamante Cheung  Hospitalist   Available on TEAMS
time spent reviewing prior charts, meds, discussing plan = 45 minutes    Called pt's son to give update ; left voicemail with call back number    d/w KIEL Sparks
Diamante Cheung  Hospitalist   Available on TEAMS
time spent reviewing prior charts, meds, discussing plan = 45 minutes    d/w ACP Violette

## 2024-10-15 NOTE — PROGRESS NOTE ADULT - PROBLEM SELECTOR PROBLEM 2
Toxic metabolic encephalopathy

## 2024-10-15 NOTE — PROGRESS NOTE ADULT - PROBLEM SELECTOR PLAN 5
Baseline mental status is AAOx2.
- Baseline mental status is AAOx2.  - p/w superimposed delirium 2/2 UTI  - mental status is much improved
Baseline mental status is AAOx2.

## 2024-10-15 NOTE — PROGRESS NOTE ADULT - PROBLEM SELECTOR PROBLEM 7
Prophylactic measure
Debility
Prophylactic measure
Prophylactic measure

## 2024-11-18 NOTE — H&P ADULT - NSHPPOASURGSITEINCISION_GEN_ALL_CORE
no I have re-evaluated the patient's fluid status and reviewed vital signs. Clinical perfusion assessment was performed.

## 2024-12-06 ENCOUNTER — APPOINTMENT (OUTPATIENT)
Age: 89
End: 2024-12-06
Payer: MEDICARE

## 2024-12-06 ENCOUNTER — LABORATORY RESULT (OUTPATIENT)
Age: 89
End: 2024-12-06

## 2024-12-06 DIAGNOSIS — I10 ESSENTIAL (PRIMARY) HYPERTENSION: ICD-10-CM

## 2024-12-06 DIAGNOSIS — R30.0 DYSURIA: ICD-10-CM

## 2024-12-06 PROCEDURE — 99442: CPT | Mod: 93

## 2024-12-27 NOTE — PATIENT PROFILE ADULT - NSPROPOAPRESSUREINJURYCT_GEN_A_NUR
Lili Keita Patient Age: 22 year old  MESSAGE:   Hazel BRAXTON would like to request a work-in for patient to be seen for pain while eating and nausea.  Is it possible to be seen prior to 1-3 as patient will be traveling back to school?  Please advise.      WEIGHT AND HEIGHT:   Wt Readings from Last 1 Encounters:   06/11/24 56.9 kg (125 lb 8.8 oz)     Ht Readings from Last 1 Encounters:   06/11/24 5' 5\" (1.651 m)     BMI Readings from Last 1 Encounters:   06/11/24 20.89 kg/m²       ALLERGIES:  Patient has no known allergies.  Current Outpatient Medications   Medication Sig Dispense Refill    albuterol 108 (90 Base) MCG/ACT inhaler [None received]      benzonatate (TESSALON PERLES) 200 MG capsule [None received]      desvenlafaxine (PRISTIQ) 25 MG 24 hr tablet Take 1 Tablet (25 mg total) by mouth Once a day 90 tablet 0    etonogestrel-ethinyl estradiol (NuvaRing) 0.12-0.015 MG/24HR vaginal ring Place 1 each vaginally as directed. 3 each 3    Polysaccharide Iron Complex 434.8 (200 Fe) MG Cap        No current facility-administered medications for this visit.     PHARMACY to use:           Pharmacy preference(s) on file:   [id number] - [place], [place] - [address] [place] [address] RD AT [facility] [address] [place] [address] [place] [address]  [address] [facility] [address]  Phone: [phone] Fax: [phone]    [facility] - [address] AT [address] [address]  Phone: [phone] Fax: [phone]      CALL BACK INFO: Ok to leave response (including medical information) with family member or on answering machine  ROUTING: Patient's physician/staff        PCP: Ellie Gonzalez MD         INS: Payor: [employer] [place] / Plan: PPO [id number] / Product Type: PPO MISC   PATIENT ADDRESS:  [address] [place] [address] [place] [address]  
Call placed to pt. Left message to call back office.   Call placed to pt's mother, on verbal. Informed pt's mom that soonest available is after pt goes back to school 1/3/25 and some providers OOO until after that date.   Attempted to start triage for pt, but pt's mother unsure of exact details. Pt at work until 1800.  Informed her once pt is triaged that info to be sent to on-call provider Dr. David.  Advised pt's mom to tell pt to call back office since office closes at 1700 today.   Text sent to pt to set up [employer] account.  Pt's mother verbalizes understanding and to inform pt regarding setting up [employer] account and contacting office.      
3

## 2025-01-29 NOTE — ED ADULT NURSE NOTE - NS ED NOTE ABUSE RESPONSE YN
Testosterone level is optimum, continue testosterone injections to 250 mg every 3 weeks.  Prescription vitamin D 1.25 mg once a week was sent to the pharmacy last week you should stay on this dose for the next 6 months this should be followed by over-the-counter vitamin D3 you should take it regularly specially in the winter months starting in October all the way till April every year.  Follow- up in 1 year earlier follow-up if any problems or concerns.    
Yes

## 2025-03-27 ENCOUNTER — INPATIENT (INPATIENT)
Facility: HOSPITAL | Age: 89
LOS: 3 days | Discharge: NURSING, REHAB OR LONG-TERM CARE | DRG: 690 | End: 2025-03-31
Attending: STUDENT IN AN ORGANIZED HEALTH CARE EDUCATION/TRAINING PROGRAM | Admitting: STUDENT IN AN ORGANIZED HEALTH CARE EDUCATION/TRAINING PROGRAM
Payer: MEDICARE

## 2025-03-27 VITALS
OXYGEN SATURATION: 95 % | RESPIRATION RATE: 20 BRPM | WEIGHT: 119.93 LBS | SYSTOLIC BLOOD PRESSURE: 127 MMHG | DIASTOLIC BLOOD PRESSURE: 75 MMHG | TEMPERATURE: 98 F | HEART RATE: 56 BPM

## 2025-03-27 DIAGNOSIS — R41.82 ALTERED MENTAL STATUS, UNSPECIFIED: ICD-10-CM

## 2025-03-27 DIAGNOSIS — Z95.0 PRESENCE OF CARDIAC PACEMAKER: Chronic | ICD-10-CM

## 2025-03-27 LAB
ALBUMIN SERPL ELPH-MCNC: 3.3 G/DL — SIGNIFICANT CHANGE UP (ref 3.3–5)
ALP SERPL-CCNC: 89 U/L — SIGNIFICANT CHANGE UP (ref 40–120)
ALT FLD-CCNC: 5 U/L — LOW (ref 10–45)
ANION GAP SERPL CALC-SCNC: 11 MMOL/L — SIGNIFICANT CHANGE UP (ref 5–17)
APPEARANCE UR: ABNORMAL
APTT BLD: 38.5 SEC — HIGH (ref 24.5–35.6)
AST SERPL-CCNC: 11 U/L — SIGNIFICANT CHANGE UP (ref 10–40)
BACTERIA # UR AUTO: ABNORMAL /HPF
BASOPHILS # BLD AUTO: 0.09 K/UL — SIGNIFICANT CHANGE UP (ref 0–0.2)
BASOPHILS NFR BLD AUTO: 0.8 % — SIGNIFICANT CHANGE UP (ref 0–2)
BILIRUB SERPL-MCNC: 0.6 MG/DL — SIGNIFICANT CHANGE UP (ref 0.2–1.2)
BILIRUB UR-MCNC: NEGATIVE — SIGNIFICANT CHANGE UP
BUN SERPL-MCNC: 10 MG/DL — SIGNIFICANT CHANGE UP (ref 7–23)
CALCIUM SERPL-MCNC: 10.2 MG/DL — SIGNIFICANT CHANGE UP (ref 8.4–10.5)
CAST: 1 /LPF — SIGNIFICANT CHANGE UP (ref 0–4)
CHLORIDE SERPL-SCNC: 102 MMOL/L — SIGNIFICANT CHANGE UP (ref 96–108)
CO2 SERPL-SCNC: 23 MMOL/L — SIGNIFICANT CHANGE UP (ref 22–31)
COLOR SPEC: YELLOW — SIGNIFICANT CHANGE UP
CREAT SERPL-MCNC: 0.48 MG/DL — LOW (ref 0.5–1.3)
DIFF PNL FLD: ABNORMAL
EGFR: 91 ML/MIN/1.73M2 — SIGNIFICANT CHANGE UP
EGFR: 91 ML/MIN/1.73M2 — SIGNIFICANT CHANGE UP
EOSINOPHIL # BLD AUTO: 0.01 K/UL — SIGNIFICANT CHANGE UP (ref 0–0.5)
EOSINOPHIL NFR BLD AUTO: 0.1 % — SIGNIFICANT CHANGE UP (ref 0–6)
FLUAV AG NPH QL: SIGNIFICANT CHANGE UP
FLUBV AG NPH QL: SIGNIFICANT CHANGE UP
GAS PNL BLDV: SIGNIFICANT CHANGE UP
GLUCOSE SERPL-MCNC: 121 MG/DL — HIGH (ref 70–99)
GLUCOSE UR QL: NEGATIVE MG/DL — SIGNIFICANT CHANGE UP
HCT VFR BLD CALC: 37.8 % — SIGNIFICANT CHANGE UP (ref 34.5–45)
HGB BLD-MCNC: 12.2 G/DL — SIGNIFICANT CHANGE UP (ref 11.5–15.5)
IMM GRANULOCYTES NFR BLD AUTO: 1.9 % — HIGH (ref 0–0.9)
INR BLD: 2.08 RATIO — HIGH (ref 0.85–1.16)
KETONES UR-MCNC: NEGATIVE MG/DL — SIGNIFICANT CHANGE UP
LACTATE SERPL-SCNC: 1.1 MMOL/L — SIGNIFICANT CHANGE UP (ref 0.5–2)
LEUKOCYTE ESTERASE UR-ACNC: ABNORMAL
LYMPHOCYTES # BLD AUTO: 1.23 K/UL — SIGNIFICANT CHANGE UP (ref 1–3.3)
LYMPHOCYTES # BLD AUTO: 11.1 % — LOW (ref 13–44)
MCHC RBC-ENTMCNC: 29.7 PG — SIGNIFICANT CHANGE UP (ref 27–34)
MCHC RBC-ENTMCNC: 32.3 G/DL — SIGNIFICANT CHANGE UP (ref 32–36)
MCV RBC AUTO: 92 FL — SIGNIFICANT CHANGE UP (ref 80–100)
MONOCYTES # BLD AUTO: 0.67 K/UL — SIGNIFICANT CHANGE UP (ref 0–0.9)
MONOCYTES NFR BLD AUTO: 6 % — SIGNIFICANT CHANGE UP (ref 2–14)
NEUTROPHILS # BLD AUTO: 8.87 K/UL — HIGH (ref 1.8–7.4)
NEUTROPHILS NFR BLD AUTO: 80.1 % — HIGH (ref 43–77)
NITRITE UR-MCNC: POSITIVE
NRBC BLD AUTO-RTO: 0 /100 WBCS — SIGNIFICANT CHANGE UP (ref 0–0)
PH UR: 6 — SIGNIFICANT CHANGE UP (ref 5–8)
PLATELET # BLD AUTO: 196 K/UL — SIGNIFICANT CHANGE UP (ref 150–400)
POTASSIUM SERPL-MCNC: 4 MMOL/L — SIGNIFICANT CHANGE UP (ref 3.5–5.3)
POTASSIUM SERPL-SCNC: 4 MMOL/L — SIGNIFICANT CHANGE UP (ref 3.5–5.3)
PROT SERPL-MCNC: 6.5 G/DL — SIGNIFICANT CHANGE UP (ref 6–8.3)
PROT UR-MCNC: 30 MG/DL
PROTHROM AB SERPL-ACNC: 23.8 SEC — HIGH (ref 9.9–13.4)
RBC # BLD: 4.11 M/UL — SIGNIFICANT CHANGE UP (ref 3.8–5.2)
RBC # FLD: 13.2 % — SIGNIFICANT CHANGE UP (ref 10.3–14.5)
RBC CASTS # UR COMP ASSIST: 6 /HPF — HIGH (ref 0–4)
REVIEW: SIGNIFICANT CHANGE UP
RSV RNA NPH QL NAA+NON-PROBE: SIGNIFICANT CHANGE UP
SARS-COV-2 RNA SPEC QL NAA+PROBE: SIGNIFICANT CHANGE UP
SODIUM SERPL-SCNC: 136 MMOL/L — SIGNIFICANT CHANGE UP (ref 135–145)
SOURCE RESPIRATORY: SIGNIFICANT CHANGE UP
SP GR SPEC: 1.02 — SIGNIFICANT CHANGE UP (ref 1–1.03)
SQUAMOUS # UR AUTO: 0 /HPF — SIGNIFICANT CHANGE UP (ref 0–5)
TSH SERPL-MCNC: 0.7 UIU/ML — SIGNIFICANT CHANGE UP (ref 0.27–4.2)
UROBILINOGEN FLD QL: 1 MG/DL — SIGNIFICANT CHANGE UP (ref 0.2–1)
WBC # BLD: 11.08 K/UL — HIGH (ref 3.8–10.5)
WBC # FLD AUTO: 11.08 K/UL — HIGH (ref 3.8–10.5)
WBC UR QL: 733 /HPF — HIGH (ref 0–5)

## 2025-03-27 PROCEDURE — 70450 CT HEAD/BRAIN W/O DYE: CPT | Mod: 26

## 2025-03-27 PROCEDURE — 93010 ELECTROCARDIOGRAM REPORT: CPT

## 2025-03-27 PROCEDURE — 99285 EMERGENCY DEPT VISIT HI MDM: CPT | Mod: GC

## 2025-03-27 PROCEDURE — 71045 X-RAY EXAM CHEST 1 VIEW: CPT | Mod: 26

## 2025-03-27 RX ORDER — AZITHROMYCIN 250 MG
500 CAPSULE ORAL ONCE
Refills: 0 | Status: COMPLETED | OUTPATIENT
Start: 2025-03-27 | End: 2025-03-27

## 2025-03-27 RX ORDER — CEFTRIAXONE 500 MG/1
1000 INJECTION, POWDER, FOR SOLUTION INTRAMUSCULAR; INTRAVENOUS ONCE
Refills: 0 | Status: COMPLETED | OUTPATIENT
Start: 2025-03-27 | End: 2025-03-27

## 2025-03-27 RX ADMIN — Medication 500 MILLILITER(S): at 14:05

## 2025-03-27 RX ADMIN — CEFTRIAXONE 100 MILLIGRAM(S): 500 INJECTION, POWDER, FOR SOLUTION INTRAMUSCULAR; INTRAVENOUS at 14:05

## 2025-03-27 RX ADMIN — Medication 250 MILLIGRAM(S): at 15:56

## 2025-03-27 NOTE — PATIENT PROFILE ADULT - FUNCTIONAL ASSESSMENT - BASIC MOBILITY 6.
2-calculated by average/Not able to assess (calculate score using [facility] averaging method)

## 2025-03-27 NOTE — PATIENT PROFILE ADULT - FALL HARM RISK - HARM RISK INTERVENTIONS

## 2025-03-27 NOTE — ED PROVIDER NOTE - PHYSICAL EXAMINATION
Gen: AAOx2, non-toxic, contracted appearing  Head: NCAT. no c/t/l spinal ttp  HEENT: EOMI, oral mucosa moist, normal conjunctiva  Lung: CTAB, no respiratory distress, no wheezes/rhonchi/rales B/L, On 1L NC satting 100%   CV: RRR, no murmurs, rubs or gallops  Abd: soft, NTND, no guarding, no CVA tenderness  MSK: no visible deformities. full ROM of b/l knee  Neuro: No focal sensory or motor deficits. muscel strengh testing 5/5 bl ue/le, no facial droop, coordination intact   Skin: Warm, well perfused, no rash

## 2025-03-27 NOTE — ED PROVIDER NOTE - OBJECTIVE STATEMENT
89-year-old female past medical history of dementia ANO x 2, A-fib on Xarelto, CHF on Coreg, hypothyroid hypertension hyperlipidemia presents ED brought in by son providing history for increased lethargy, slower to respond, and worsening cognitive function over the last 2 to 3 weeks, precipitating and difficulty with ambulation today prompting visit to ED.  Denies any recent trauma, has 24-hour aide.  Patient was recently hospitalized for urinary tract infection.  Also endorsing mild confusion, not as interactive.  Denies fevers chills nausea vomiting diarrhea chest pain shortness of breath abdominal pain dysuria hematuria.

## 2025-03-27 NOTE — ED PROVIDER NOTE - CLINICAL SUMMARY MEDICAL DECISION MAKING FREE TEXT BOX
Scooetr 89-year-old female with presenting from home by EMS with son patient history of pacemaker A-fib on Xarelto history of heart failure on Coreg and spironolactone history of hypothyroid presents with progressive worsening cognitive function over 2 to 3 weeks son says she has been dragging her right lower extremity no fall no head trauma decreased p.o. intake patient with history of multiple UTIs in the past denies any complaints was found by EMS to be hypoxic to 9192% on room air placed on 2 L on 2 L she is 95% lungs clear to auscultation bilaterally abdomen soft nontender ANO x 2 no droop no drift strength 5 out of 5 upper and lower extremity abdomen soft nontender will CT head with AMS to rule out bleed despite no history of fall will send TSH as patient has history of hypothyroid get rectal temp and workup for infectious etiology patient has been coughing dry mucous membranes possible pneumonia possible UTI empiric ceftriaxone and Zithromax and follow-up labs and imaging likely will need admission

## 2025-03-27 NOTE — ED ADULT NURSE NOTE - OBJECTIVE STATEMENT
89y Female PMHx dementia A&Ox2 at baseline, afib on xarelto, CHF, HTN, HLD, and hypothyroid BIBEMS from home for AMS. As per family member at bedside, pt has had inc lethargy, is slower to respond, mild confusion, less interactive and worsening cognitive function x 2-3w, pt coming in today because she had difficulty ambulating. Pt has a 24h aide. Pt denies any recent trauma, vomiting, diarrhea, cp, SOB, abd pain, dysuria, and hematuria. Pt recently hospitalized for urinary tract infection. Patient straight cathed performed by JAYRO Lomax for urine using sterile technique. Comfort and safety provided. IV placed, labs drawn and sent, medicated as ordered, seen and eval by MD, stretcher in lowest position and locked, call bell within reach.

## 2025-03-28 DIAGNOSIS — I48.91 UNSPECIFIED ATRIAL FIBRILLATION: ICD-10-CM

## 2025-03-28 DIAGNOSIS — N39.0 URINARY TRACT INFECTION, SITE NOT SPECIFIED: ICD-10-CM

## 2025-03-28 DIAGNOSIS — I48.0 PAROXYSMAL ATRIAL FIBRILLATION: ICD-10-CM

## 2025-03-28 DIAGNOSIS — Z29.9 ENCOUNTER FOR PROPHYLACTIC MEASURES, UNSPECIFIED: ICD-10-CM

## 2025-03-28 DIAGNOSIS — E03.9 HYPOTHYROIDISM, UNSPECIFIED: ICD-10-CM

## 2025-03-28 DIAGNOSIS — I50.9 HEART FAILURE, UNSPECIFIED: ICD-10-CM

## 2025-03-28 LAB
ALBUMIN SERPL ELPH-MCNC: 3 G/DL — LOW (ref 3.3–5)
ALP SERPL-CCNC: 82 U/L — SIGNIFICANT CHANGE UP (ref 40–120)
ALT FLD-CCNC: <5 U/L — LOW (ref 10–45)
ANION GAP SERPL CALC-SCNC: 11 MMOL/L — SIGNIFICANT CHANGE UP (ref 5–17)
AST SERPL-CCNC: 10 U/L — SIGNIFICANT CHANGE UP (ref 10–40)
BILIRUB SERPL-MCNC: 0.5 MG/DL — SIGNIFICANT CHANGE UP (ref 0.2–1.2)
BUN SERPL-MCNC: 10 MG/DL — SIGNIFICANT CHANGE UP (ref 7–23)
CALCIUM SERPL-MCNC: 10.2 MG/DL — SIGNIFICANT CHANGE UP (ref 8.4–10.5)
CHLORIDE SERPL-SCNC: 103 MMOL/L — SIGNIFICANT CHANGE UP (ref 96–108)
CO2 SERPL-SCNC: 23 MMOL/L — SIGNIFICANT CHANGE UP (ref 22–31)
CREAT SERPL-MCNC: 0.49 MG/DL — LOW (ref 0.5–1.3)
EGFR: 91 ML/MIN/1.73M2 — SIGNIFICANT CHANGE UP
EGFR: 91 ML/MIN/1.73M2 — SIGNIFICANT CHANGE UP
GLUCOSE SERPL-MCNC: 104 MG/DL — HIGH (ref 70–99)
HCT VFR BLD CALC: 36.1 % — SIGNIFICANT CHANGE UP (ref 34.5–45)
HGB BLD-MCNC: 11.8 G/DL — SIGNIFICANT CHANGE UP (ref 11.5–15.5)
MAGNESIUM SERPL-MCNC: 1.9 MG/DL — SIGNIFICANT CHANGE UP (ref 1.6–2.6)
MCHC RBC-ENTMCNC: 30.3 PG — SIGNIFICANT CHANGE UP (ref 27–34)
MCHC RBC-ENTMCNC: 32.7 G/DL — SIGNIFICANT CHANGE UP (ref 32–36)
MCV RBC AUTO: 92.6 FL — SIGNIFICANT CHANGE UP (ref 80–100)
NRBC BLD AUTO-RTO: 0 /100 WBCS — SIGNIFICANT CHANGE UP (ref 0–0)
PHOSPHATE SERPL-MCNC: 2.4 MG/DL — LOW (ref 2.5–4.5)
PLATELET # BLD AUTO: 221 K/UL — SIGNIFICANT CHANGE UP (ref 150–400)
POTASSIUM SERPL-MCNC: 3.9 MMOL/L — SIGNIFICANT CHANGE UP (ref 3.5–5.3)
POTASSIUM SERPL-SCNC: 3.9 MMOL/L — SIGNIFICANT CHANGE UP (ref 3.5–5.3)
PROT SERPL-MCNC: 6.2 G/DL — SIGNIFICANT CHANGE UP (ref 6–8.3)
RBC # BLD: 3.9 M/UL — SIGNIFICANT CHANGE UP (ref 3.8–5.2)
RBC # FLD: 13.3 % — SIGNIFICANT CHANGE UP (ref 10.3–14.5)
SODIUM SERPL-SCNC: 137 MMOL/L — SIGNIFICANT CHANGE UP (ref 135–145)
TSH SERPL-MCNC: 0.9 UIU/ML — SIGNIFICANT CHANGE UP (ref 0.27–4.2)
WBC # BLD: 9.18 K/UL — SIGNIFICANT CHANGE UP (ref 3.8–10.5)
WBC # FLD AUTO: 9.18 K/UL — SIGNIFICANT CHANGE UP (ref 3.8–10.5)

## 2025-03-28 PROCEDURE — 99223 1ST HOSP IP/OBS HIGH 75: CPT | Mod: GC

## 2025-03-28 RX ORDER — CARVEDILOL 3.12 MG/1
3.12 TABLET, FILM COATED ORAL EVERY 12 HOURS
Refills: 0 | Status: DISCONTINUED | OUTPATIENT
Start: 2025-03-28 | End: 2025-03-31

## 2025-03-28 RX ORDER — FOLIC ACID 1 MG/1
1 TABLET ORAL DAILY
Refills: 0 | Status: DISCONTINUED | OUTPATIENT
Start: 2025-03-28 | End: 2025-03-31

## 2025-03-28 RX ORDER — CEFTRIAXONE 500 MG/1
1000 INJECTION, POWDER, FOR SOLUTION INTRAMUSCULAR; INTRAVENOUS EVERY 24 HOURS
Refills: 0 | Status: COMPLETED | OUTPATIENT
Start: 2025-03-28 | End: 2025-03-30

## 2025-03-28 RX ORDER — RIVAROXABAN 10 MG/1
20 TABLET, FILM COATED ORAL
Refills: 0 | Status: DISCONTINUED | OUTPATIENT
Start: 2025-03-28 | End: 2025-03-31

## 2025-03-28 RX ORDER — ACETAMINOPHEN 500 MG/5ML
650 LIQUID (ML) ORAL EVERY 6 HOURS
Refills: 0 | Status: DISCONTINUED | OUTPATIENT
Start: 2025-03-28 | End: 2025-03-31

## 2025-03-28 RX ORDER — SOD PHOS DI, MONO/K PHOS MONO 250 MG
1 TABLET ORAL EVERY 4 HOURS
Refills: 0 | Status: COMPLETED | OUTPATIENT
Start: 2025-03-28 | End: 2025-03-28

## 2025-03-28 RX ORDER — SODIUM CHLORIDE 9 G/1000ML
500 INJECTION, SOLUTION INTRAVENOUS ONCE
Refills: 0 | Status: DISCONTINUED | OUTPATIENT
Start: 2025-03-28 | End: 2025-03-28

## 2025-03-28 RX ORDER — B1/B2/B3/B5/B6/B12/VIT C/FOLIC 500-0.5 MG
1 TABLET ORAL DAILY
Refills: 0 | Status: DISCONTINUED | OUTPATIENT
Start: 2025-03-28 | End: 2025-03-31

## 2025-03-28 RX ORDER — LEVOTHYROXINE SODIUM 300 MCG
75 TABLET ORAL DAILY
Refills: 0 | Status: DISCONTINUED | OUTPATIENT
Start: 2025-03-28 | End: 2025-03-31

## 2025-03-28 RX ORDER — SPIRONOLACTONE 25 MG
25 TABLET ORAL DAILY
Refills: 0 | Status: DISCONTINUED | OUTPATIENT
Start: 2025-03-28 | End: 2025-03-31

## 2025-03-28 RX ADMIN — CEFTRIAXONE 100 MILLIGRAM(S): 500 INJECTION, POWDER, FOR SOLUTION INTRAMUSCULAR; INTRAVENOUS at 06:07

## 2025-03-28 RX ADMIN — CARVEDILOL 3.12 MILLIGRAM(S): 3.12 TABLET, FILM COATED ORAL at 05:02

## 2025-03-28 RX ADMIN — Medication 500 MILLIGRAM(S): at 09:46

## 2025-03-28 RX ADMIN — Medication 75 MICROGRAM(S): at 05:01

## 2025-03-28 RX ADMIN — Medication 2000 UNIT(S): at 09:50

## 2025-03-28 RX ADMIN — Medication 1 PACKET(S): at 09:51

## 2025-03-28 RX ADMIN — Medication 1 TABLET(S): at 09:50

## 2025-03-28 RX ADMIN — CARVEDILOL 3.12 MILLIGRAM(S): 3.12 TABLET, FILM COATED ORAL at 17:56

## 2025-03-28 RX ADMIN — RIVAROXABAN 20 MILLIGRAM(S): 10 TABLET, FILM COATED ORAL at 17:56

## 2025-03-28 RX ADMIN — Medication 25 MILLIGRAM(S): at 05:03

## 2025-03-28 RX ADMIN — FOLIC ACID 1 MILLIGRAM(S): 1 TABLET ORAL at 09:50

## 2025-03-28 RX ADMIN — Medication 1 PACKET(S): at 11:31

## 2025-03-28 NOTE — H&P ADULT - ATTENDING COMMENTS
Patient seen and observed at bedside. Patient with hx of dementia, A&Ox2 at baseline. Patient seems to be at baseline mental status. Patient with episode of vomiting and significant fatigue lethargy. Likely 2/2 to UTI. Will treat with ceftriaxone (previous cultures susceptible). Follow urine and blood cultures. Will obtain TSH to r/o poorly controlled hypothyroidism. Will continue home medications for chronic conditions.

## 2025-03-28 NOTE — PHYSICAL THERAPY INITIAL EVALUATION ADULT - ADDITIONAL COMMENTS
Pt is a poor historian. Pt reports she resides in a private house alone with 24/7 HHA. Pt is a poor historian. As per case coordinator; Pt resides in a private house alone with 24/7 HHA. Pt ambulates with a rolling walker. Pt dependent with all ADLs. Pt owns rolling walker and shower chair.

## 2025-03-28 NOTE — PROGRESS NOTE ADULT - ASSESSMENT
88 YO female with PMH of dementia AOx2, Afib on Xarelto, CHF, hypothyroidism, HTN, HLD, presented to the ED, due to increased lethargy and weakness for 2 week, was found to have a UTI

## 2025-03-28 NOTE — H&P ADULT - PROBLEM SELECTOR PLAN 3
TTE 1/8/24  LVEF 64%, severely enlarged RV , RA and LA, severe pulm HTN   c/w home aldactone and carvedilol

## 2025-03-28 NOTE — H&P ADULT - ASSESSMENT
89 YO female with PMH of dementia AOx2, Afib on Xarelto, CHF, hypothyroidism, HTN, HLD, presented to the ED, due to increased lethargy and weakness for 2 week, was found to have a UTI

## 2025-03-28 NOTE — PHYSICAL THERAPY INITIAL EVALUATION ADULT - NSPTDMEREC_GEN_A_CORE
Pt requires a polyfly wheelchair due to deficits in strength, balance, and inability to perform short ambulatory transfers, the use of a manual wheelchair will significantly improve the beneficiary’s ability to participate in MRADLs./wheelchair

## 2025-03-28 NOTE — H&P ADULT - HISTORY OF PRESENT ILLNESS
89 YO female with PMH of dementia AOx2, Afib on Xarelto, CHF, hypothyroidism, HTN, HLD, presented to the ED, due to increased lethargy and change in cognitive function over to the past 2-3 weeks. Reports increased difficulty with ambulations. Per son, pt base line using a walker at home, but seems to be dragging her right leg more than usual. Pt endorse one episode of vomit yesterday, pain with urinary and urinary incontinence. Pt denies F/C, Chest pain, SOB, HA, abdominal pain, diarrhea or constipations.  88 YO female with PMH of dementia AOx2, Afib on Xarelto, CHF, hypothyroidism, HTN, HLD, presents due to increased lethargy and change in cognitive function over to the past 2-3 weeks. Reports increased difficulty with ambulation. Per son, pt base line using a walker at home, but seems to be dragging her right leg more than usual. Pt endorse one episode of vomit yesterday, pain with urinary and urinary incontinence. Pt denies F/C, Chest pain, SOB, HA, abdominal pain, diarrhea or constipations.

## 2025-03-28 NOTE — PHYSICAL THERAPY INITIAL EVALUATION ADULT - GENERAL OBSERVATIONS, REHAB EVAL
Pt received supine in bed, A&0x3, following 100% simple step commands. Pt  increased lethargy, slower to respond, and worsening cognitive function over the last 2 to 3 weeks, precipitating and difficulty ambulating. Pt received supine in bed, A&0x3, following 100% simple step commands. Pt presenting with increased lethargy, slower to respond, and worsening cognitive function over the last 2 to 3 weeks, precipitating and difficulty ambulating.

## 2025-03-28 NOTE — PROGRESS NOTE ADULT - SUBJECTIVE AND OBJECTIVE BOX
DATE OF SERVICE: 03-28-25 @ 07:49    Patient is a 89y old  Female who presents with a chief complaint of weakness (28 Mar 2025 01:45)      SUBJECTIVE / OVERNIGHT EVENTS:  Overnight,  Pt seen and examined at bedside.    ROS negative except as above.    MEDICATIONS  (STANDING):  ascorbic acid 500 milliGRAM(s) Oral daily  carvedilol 3.125 milliGRAM(s) Oral every 12 hours  cefTRIAXone   IVPB 1000 milliGRAM(s) IV Intermittent every 24 hours  cholecalciferol 2000 Unit(s) Oral daily  folic acid 1 milliGRAM(s) Oral daily  levothyroxine 75 MICROGram(s) Oral daily  multivitamin 1 Tablet(s) Oral daily  rivaroxaban 20 milliGRAM(s) Oral with dinner  spironolactone 25 milliGRAM(s) Oral daily    MEDICATIONS  (PRN):  acetaminophen     Tablet .. 650 milliGRAM(s) Oral every 6 hours PRN Temp greater or equal to 38C (100.4F), Mild Pain (1 - 3)      Vital Signs Last 24 Hrs  T(C): 36.4 (28 Mar 2025 05:04), Max: 37.6 (27 Mar 2025 14:16)  T(F): 97.6 (28 Mar 2025 05:04), Max: 99.7 (27 Mar 2025 14:16)  HR: 71 (28 Mar 2025 05:04) (56 - 83)  BP: 147/86 (28 Mar 2025 05:04) (122/79 - 147/86)  BP(mean): --  RR: 18 (28 Mar 2025 05:04) (18 - 20)  SpO2: 94% (28 Mar 2025 05:04) (94% - 99%)    Parameters below as of 28 Mar 2025 05:04  Patient On (Oxygen Delivery Method): room air      CAPILLARY BLOOD GLUCOSE        I&O's Summary    27 Mar 2025 07:01  -  28 Mar 2025 07:00  --------------------------------------------------------  IN: 150 mL / OUT: 0 mL / NET: 150 mL        PHYSICAL EXAM:  GENERAL: NAD, well-developed  HEAD:  Atraumatic, Normocephalic  EYES: EOMI, conjunctiva and sclera clear  NECK: Supple, No JVD  CHEST/LUNG: Clear to auscultation bilaterally; No wheeze  HEART: Regular rate and rhythm; No murmurs, rubs, or gallops  ABDOMEN: Soft, Nontender, Nondistended; Bowel sounds present  EXTREMITIES:  2+ Peripheral Pulses, No clubbing, cyanosis, or edema  NEUROLOGY: AOx3, non-focal  SKIN: No rashes or lesions    LABS:                        11.8   9.18  )-----------( 221      ( 28 Mar 2025 06:52 )             36.1     03-27    136  |  102  |  10  ----------------------------<  121[H]  4.0   |  23  |  0.48[L]    Ca    10.2      27 Mar 2025 13:57    TPro  6.5  /  Alb  3.3  /  TBili  0.6  /  DBili  x   /  AST  11  /  ALT  5[L]  /  AlkPhos  89  03-27    PT/INR - ( 27 Mar 2025 13:57 )   PT: 23.8 sec;   INR: 2.08 ratio         PTT - ( 27 Mar 2025 13:57 )  PTT:38.5 sec        MICRO:    Urinalysis with Rflx Culture (collected 03-27-25 @ 14:31)        RADIOLOGY & ADDITIONAL TESTS:           DATE OF SERVICE: 03-28-25 @ 07:49    Patient is a 89y old  Female who presents with a chief complaint of weakness (28 Mar 2025 01:45)      SUBJECTIVE / OVERNIGHT EVENTS:  Overnight, no acute events.   Pt seen and examined at bedside. Denies pain. Eating fine. Sleeping ok, not as good as at home. Moderately confused.     ROS negative except as above.    MEDICATIONS  (STANDING):  ascorbic acid 500 milliGRAM(s) Oral daily  carvedilol 3.125 milliGRAM(s) Oral every 12 hours  cefTRIAXone   IVPB 1000 milliGRAM(s) IV Intermittent every 24 hours  cholecalciferol 2000 Unit(s) Oral daily  folic acid 1 milliGRAM(s) Oral daily  levothyroxine 75 MICROGram(s) Oral daily  multivitamin 1 Tablet(s) Oral daily  rivaroxaban 20 milliGRAM(s) Oral with dinner  spironolactone 25 milliGRAM(s) Oral daily    MEDICATIONS  (PRN):  acetaminophen     Tablet .. 650 milliGRAM(s) Oral every 6 hours PRN Temp greater or equal to 38C (100.4F), Mild Pain (1 - 3)      Vital Signs Last 24 Hrs  T(C): 36.4 (28 Mar 2025 05:04), Max: 37.6 (27 Mar 2025 14:16)  T(F): 97.6 (28 Mar 2025 05:04), Max: 99.7 (27 Mar 2025 14:16)  HR: 71 (28 Mar 2025 05:04) (56 - 83)  BP: 147/86 (28 Mar 2025 05:04) (122/79 - 147/86)  BP(mean): --  RR: 18 (28 Mar 2025 05:04) (18 - 20)  SpO2: 94% (28 Mar 2025 05:04) (94% - 99%)    Parameters below as of 28 Mar 2025 05:04  Patient On (Oxygen Delivery Method): room air      CAPILLARY BLOOD GLUCOSE        I&O's Summary    27 Mar 2025 07:01  -  28 Mar 2025 07:00  --------------------------------------------------------  IN: 150 mL / OUT: 0 mL / NET: 150 mL        PHYSICAL EXAM:  GENERAL: NAD, well-developed  HEAD:  Atraumatic, Normocephalic  EYES: EOMI, conjunctiva and sclera clear  NECK: Supple, No JVD  CHEST/LUNG: Clear to auscultation bilaterally; No wheeze  HEART: Regular rate and rhythm; No murmurs, rubs, or gallops  ABDOMEN: Soft, Nontender, Nondistended; Bowel sounds present  EXTREMITIES:  2+ Peripheral Pulses, No clubbing, cyanosis, or edema  NEUROLOGY: AOx1-2, non-focal  SKIN: No rashes or lesions    LABS:                        11.8   9.18  )-----------( 221      ( 28 Mar 2025 06:52 )             36.1     03-27    136  |  102  |  10  ----------------------------<  121[H]  4.0   |  23  |  0.48[L]    Ca    10.2      27 Mar 2025 13:57    TPro  6.5  /  Alb  3.3  /  TBili  0.6  /  DBili  x   /  AST  11  /  ALT  5[L]  /  AlkPhos  89  03-27    PT/INR - ( 27 Mar 2025 13:57 )   PT: 23.8 sec;   INR: 2.08 ratio         PTT - ( 27 Mar 2025 13:57 )  PTT:38.5 sec        MICRO:    Urinalysis with Rflx Culture (collected 03-27-25 @ 14:31)        RADIOLOGY & ADDITIONAL TESTS:

## 2025-03-28 NOTE — H&P ADULT - NSHPLABSRESULTS_GEN_ALL_CORE
LABS: When present labs, imaging, and ECG were personally reviewed                          12.2   11.08 )-----------( 196      ( 27 Mar 2025 13:57 )             37.8           136  |  102  |  10  ----------------------------<  121[H]  4.0   |  23  |  0.48[L]    Ca    10.2      27 Mar 2025 13:57    TPro  6.5  /  Alb  3.3  /  TBili  0.6  /  DBili  x   /  AST  11  /  ALT  5[L]  /  AlkPhos  89         LIVER FUNCTIONS - ( 27 Mar 2025 13:57 )  Alb: 3.3 g/dL / Pro: 6.5 g/dL / ALK PHOS: 89 U/L / ALT: 5 U/L / AST: 11 U/L / GGT: x                    Urinalysis Basic - ( 27 Mar 2025 14:31 )    Color: Yellow / Appearance: Cloudy / S.017 / pH: x  Gluc: x / Ketone: Negative mg/dL  / Bili: Negative / Urobili: 1.0 mg/dL   Blood: x / Protein: 30 mg/dL / Nitrite: Positive   Leuk Esterase: Large / RBC: 6 /HPF /  /HPF   Sq Epi: x / Non Sq Epi: 0 /HPF / Bacteria: Many /HPF        PT/INR - ( 27 Mar 2025 13:57 )   PT: 23.8 sec;   INR: 2.08 ratio         PTT - ( 27 Mar 2025 13:57 )  PTT:38.5 sec    Lactate Trend   @ 13:57 Lactate:1.1             CAPILLARY BLOOD GLUCOSE                RADIOLOGY & ADDITIONAL TESTS:

## 2025-03-29 ENCOUNTER — TRANSCRIPTION ENCOUNTER (OUTPATIENT)
Age: 89
End: 2025-03-29

## 2025-03-29 PROCEDURE — 99232 SBSQ HOSP IP/OBS MODERATE 35: CPT | Mod: GC

## 2025-03-29 RX ORDER — HYPROMELLOSE 0.4 %
1 DROPS OPHTHALMIC (EYE)
Refills: 0 | DISCHARGE

## 2025-03-29 RX ADMIN — Medication 2000 UNIT(S): at 11:39

## 2025-03-29 RX ADMIN — FOLIC ACID 1 MILLIGRAM(S): 1 TABLET ORAL at 11:39

## 2025-03-29 RX ADMIN — CARVEDILOL 3.12 MILLIGRAM(S): 3.12 TABLET, FILM COATED ORAL at 17:06

## 2025-03-29 RX ADMIN — Medication 75 MICROGRAM(S): at 06:08

## 2025-03-29 RX ADMIN — CARVEDILOL 3.12 MILLIGRAM(S): 3.12 TABLET, FILM COATED ORAL at 06:09

## 2025-03-29 RX ADMIN — Medication 500 MILLIGRAM(S): at 11:39

## 2025-03-29 RX ADMIN — Medication 1 TABLET(S): at 11:39

## 2025-03-29 RX ADMIN — Medication 25 MILLIGRAM(S): at 06:08

## 2025-03-29 RX ADMIN — RIVAROXABAN 20 MILLIGRAM(S): 10 TABLET, FILM COATED ORAL at 17:06

## 2025-03-29 RX ADMIN — CEFTRIAXONE 100 MILLIGRAM(S): 500 INJECTION, POWDER, FOR SOLUTION INTRAMUSCULAR; INTRAVENOUS at 06:08

## 2025-03-29 NOTE — DISCHARGE NOTE PROVIDER - NSDCMRMEDTOKEN_GEN_ALL_CORE_FT
ascorbic acid: 1 tab(s) orally once a day  carvedilol 3.125 mg oral tablet: 1 tab(s) orally every 12 hours as needed  cholecalciferol 125 mcg (5000 intl units) oral tablet: 1 tab(s) orally once a day  folic acid 1 mg oral tablet: 1 tab(s) orally once a day  levothyroxine 75 mcg (0.075 mg) oral tablet: 1 tab(s) orally once a day  Multiple Vitamins oral tablet: 1 tab(s) orally once a day  Refresh Liquigel ophthalmic gel: 1 drop(s) in each affected eye as needed for  dry eyes  rivaroxaban 20 mg oral tablet: 1 tab(s) orally once a day (before a meal)  spironolactone 25 mg oral tablet: 1 tab(s) orally once a day   ascorbic acid: 1 tab(s) orally once a day  carvedilol 3.125 mg oral tablet: 1 tab(s) orally every 12 hours as needed  cholecalciferol 125 mcg (5000 intl units) oral tablet: 1 tab(s) orally once a day  folic acid 1 mg oral tablet: 1 tab(s) orally once a day  Home PT: ICD-10: M17.0  Height: 5&#x27;2  Weight: 54kg  Length of therapy: Lifetime  levothyroxine 75 mcg (0.075 mg) oral tablet: 1 tab(s) orally once a day  Multiple Vitamins oral tablet: 1 tab(s) orally once a day  rivaroxaban 20 mg oral tablet: 1 tab(s) orally once a day (before a meal)  spironolactone 25 mg oral tablet: 1 tab(s) orally once a day

## 2025-03-29 NOTE — DISCHARGE NOTE PROVIDER - NSDCCPTREATMENT_GEN_ALL_CORE_FT
PRINCIPAL PROCEDURE  Procedure: CT head wo con  Findings and Treatment: IMPRESSION:  1. No significant change.  2. No acute hemorrhage, hydrocephalus or territorial infarct identified.  3. Additional findings described in detail above.

## 2025-03-29 NOTE — DISCHARGE NOTE PROVIDER - NSDCCPCAREPLAN_GEN_ALL_CORE_FT
PRINCIPAL DISCHARGE DIAGNOSIS  Diagnosis: Acute UTI  Assessment and Plan of Treatment: You were admitted to the hospital for a urinary tract infection (UTI) with benign prostatic hyperplasia (BPH). You received treatment with IV antibiotics (ceftriaxone) during your stay here. To continue managing your infection, you'll be taking a different oral antibiotic (cefpodoxime) for the next four days at home.  During your hospital stay, we monitored your response to treatment and ensured your condition stabilized. It's important to complete the prescribed course of antibiotics as directed to fully recover from the infection and prevent its recurrence.  ToDos:  - Finish the entire course of cefpodoxime as prescribed.  - Follow up with your PCP regarding this hospitalization  - Set up an appointment for your BPH with a urology doctor.   Watch out for:   - If you experience increased urinary frequency, burning with urination, fevers (temp >100.4F), please contact your doctor immediately.  - Follow up with primary care physician      SECONDARY DISCHARGE DIAGNOSES  Diagnosis: Acute UTI  Assessment and Plan of Treatment:

## 2025-03-29 NOTE — PROGRESS NOTE ADULT - PROBLEM SELECTOR PLAN 1
positive UA  f/u Ucx, BCx  c/w ceftriaxone positive UA  f/u Ucx, growing e coli  bcx neg 24 hours   c/w ceftriaxone

## 2025-03-29 NOTE — PROGRESS NOTE ADULT - SUBJECTIVE AND OBJECTIVE BOX
DATE OF SERVICE: 03-29-25 @ 07:20    Patient is a 89y old  Female who presents with a chief complaint of weakness (28 Mar 2025 07:49)      SUBJECTIVE / OVERNIGHT EVENTS:  Overnight,  Pt seen and examined at bedside.    ROS negative except as above.    MEDICATIONS  (STANDING):  ascorbic acid 500 milliGRAM(s) Oral daily  carvedilol 3.125 milliGRAM(s) Oral every 12 hours  cefTRIAXone   IVPB 1000 milliGRAM(s) IV Intermittent every 24 hours  cholecalciferol 2000 Unit(s) Oral daily  folic acid 1 milliGRAM(s) Oral daily  levothyroxine 75 MICROGram(s) Oral daily  multivitamin 1 Tablet(s) Oral daily  rivaroxaban 20 milliGRAM(s) Oral with dinner  spironolactone 25 milliGRAM(s) Oral daily    MEDICATIONS  (PRN):  acetaminophen     Tablet .. 650 milliGRAM(s) Oral every 6 hours PRN Temp greater or equal to 38C (100.4F), Mild Pain (1 - 3)      Vital Signs Last 24 Hrs  T(C): 36.4 (29 Mar 2025 06:00), Max: 37.3 (28 Mar 2025 20:48)  T(F): 97.5 (29 Mar 2025 06:00), Max: 99.1 (28 Mar 2025 20:48)  HR: 65 (29 Mar 2025 06:00) (65 - 80)  BP: 128/79 (29 Mar 2025 06:00) (106/66 - 128/79)  BP(mean): --  RR: 18 (29 Mar 2025 06:00) (18 - 18)  SpO2: 94% (29 Mar 2025 06:00) (93% - 95%)    Parameters below as of 29 Mar 2025 06:00  Patient On (Oxygen Delivery Method): room air      CAPILLARY BLOOD GLUCOSE        I&O's Summary    28 Mar 2025 07:01  -  29 Mar 2025 07:00  --------------------------------------------------------  IN: 220 mL / OUT: 700 mL / NET: -480 mL        PHYSICAL EXAM:  GENERAL: NAD, well-developed  HEAD:  Atraumatic, Normocephalic  EYES: EOMI, conjunctiva and sclera clear  NECK: Supple, No JVD  CHEST/LUNG: Clear to auscultation bilaterally; No wheeze  HEART: Regular rate and rhythm; No murmurs, rubs, or gallops  ABDOMEN: Soft, Nontender, Nondistended; Bowel sounds present  EXTREMITIES:  2+ Peripheral Pulses, No clubbing, cyanosis, or edema  NEUROLOGY: AOx3, non-focal  SKIN: No rashes or lesions    LABS:                        11.8   9.18  )-----------( 221      ( 28 Mar 2025 06:52 )             36.1     03-28    137  |  103  |  10  ----------------------------<  104[H]  3.9   |  23  |  0.49[L]    Ca    10.2      28 Mar 2025 06:52  Phos  2.4     03-28  Mg     1.9     03-28    TPro  6.2  /  Alb  3.0[L]  /  TBili  0.5  /  DBili  x   /  AST  10  /  ALT  <5[L]  /  AlkPhos  82  03-28    PT/INR - ( 27 Mar 2025 13:57 )   PT: 23.8 sec;   INR: 2.08 ratio         PTT - ( 27 Mar 2025 13:57 )  PTT:38.5 sec        MICRO:      RADIOLOGY & ADDITIONAL TESTS:           DATE OF SERVICE: 03-29-25 @ 07:20    Patient is a 89y old  Female who presents with a chief complaint of weakness (28 Mar 2025 07:49)      SUBJECTIVE / OVERNIGHT EVENTS:  Overnight, no acute events.   Pt seen and examined at bedside. Reports no issues. Feeling well. Eating well.     ROS negative except as above.    MEDICATIONS  (STANDING):  ascorbic acid 500 milliGRAM(s) Oral daily  carvedilol 3.125 milliGRAM(s) Oral every 12 hours  cefTRIAXone   IVPB 1000 milliGRAM(s) IV Intermittent every 24 hours  cholecalciferol 2000 Unit(s) Oral daily  folic acid 1 milliGRAM(s) Oral daily  levothyroxine 75 MICROGram(s) Oral daily  multivitamin 1 Tablet(s) Oral daily  rivaroxaban 20 milliGRAM(s) Oral with dinner  spironolactone 25 milliGRAM(s) Oral daily    MEDICATIONS  (PRN):  acetaminophen     Tablet .. 650 milliGRAM(s) Oral every 6 hours PRN Temp greater or equal to 38C (100.4F), Mild Pain (1 - 3)      Vital Signs Last 24 Hrs  T(C): 36.4 (29 Mar 2025 06:00), Max: 37.3 (28 Mar 2025 20:48)  T(F): 97.5 (29 Mar 2025 06:00), Max: 99.1 (28 Mar 2025 20:48)  HR: 65 (29 Mar 2025 06:00) (65 - 80)  BP: 128/79 (29 Mar 2025 06:00) (106/66 - 128/79)  BP(mean): --  RR: 18 (29 Mar 2025 06:00) (18 - 18)  SpO2: 94% (29 Mar 2025 06:00) (93% - 95%)    Parameters below as of 29 Mar 2025 06:00  Patient On (Oxygen Delivery Method): room air      CAPILLARY BLOOD GLUCOSE        I&O's Summary    28 Mar 2025 07:01  -  29 Mar 2025 07:00  --------------------------------------------------------  IN: 220 mL / OUT: 700 mL / NET: -480 mL        PHYSICAL EXAM:  GENERAL: NAD, well-developed  HEAD:  Atraumatic, Normocephalic  EYES: EOMI, conjunctiva and sclera clear  NECK: Supple, No JVD  CHEST/LUNG: Clear to auscultation bilaterally; No wheeze  HEART: Regular rate and rhythm; No murmurs, rubs, or gallops  ABDOMEN: Soft, Nontender, Nondistended; Bowel sounds present  EXTREMITIES:  2+ Peripheral Pulses, No clubbing, cyanosis, or edema  NEUROLOGY: AOx1-2, non-focal  SKIN: No rashes or lesions    LABS:                        11.8   9.18  )-----------( 221      ( 28 Mar 2025 06:52 )             36.1     03-28    137  |  103  |  10  ----------------------------<  104[H]  3.9   |  23  |  0.49[L]    Ca    10.2      28 Mar 2025 06:52  Phos  2.4     03-28  Mg     1.9     03-28    TPro  6.2  /  Alb  3.0[L]  /  TBili  0.5  /  DBili  x   /  AST  10  /  ALT  <5[L]  /  AlkPhos  82  03-28    PT/INR - ( 27 Mar 2025 13:57 )   PT: 23.8 sec;   INR: 2.08 ratio         PTT - ( 27 Mar 2025 13:57 )  PTT:38.5 sec        MICRO:      RADIOLOGY & ADDITIONAL TESTS:

## 2025-03-29 NOTE — DISCHARGE NOTE PROVIDER - CARE PROVIDER_API CALL
Renita Jack  Geriatric Medicine  [address] [place] [id number]  Phone: [phone]  Fax: [phone]  Follow Up Time: 2 weeks

## 2025-03-29 NOTE — DISCHARGE NOTE PROVIDER - HOSPITAL COURSE
HPI:  88 YO female with PMH of dementia AOx2, Afib on Xarelto, CHF, hypothyroidism, HTN, HLD, presents due to increased lethargy and change in cognitive function over to the past 2-3 weeks. Reports increased difficulty with ambulation. Per son, pt base line using a walker at home, but seems to be dragging her right leg more than usual. Pt endorse one episode of vomit yesterday, pain with urinary and urinary incontinence. Pt denies F/C, Chest pain, SOB, HA, abdominal pain, diarrhea or constipations.  (28 Mar 2025 01:45)    Hospital Course:      On day of discharge, patient is clinically stable with no new exam findings or acute symptoms compared to prior. The patient was seen by the attending physician on the date of discharge and deemed stable and acceptable for discharge. The patient's chronic medical conditions were treated accordingly per the patient's home medication regimen. The patient's medication reconciliation, follow up appointments, discharge instructions, and significant lab and diagnostic studies are as noted.   Important Medication Changes and Reason:    Active or Pending Issues Requiring Follow-up:    Advanced Directives:   [ ] Full code  [ ] DNR  [ ] Hospice    Discharge Diagnoses:         HPI:  89 YO female with PMH of dementia AOx2, Afib on Xarelto, CHF, hypothyroidism, HTN, HLD, presents due to increased lethargy and change in cognitive function over to the past 2-3 weeks. Reports increased difficulty with ambulation. Per son, pt base line using a walker at home, but seems to be dragging her right leg more than usual. Pt endorse one episode of vomit yesterday, pain with urinary and urinary incontinence. Pt denies F/C, Chest pain, SOB, HA, abdominal pain, diarrhea or constipations.  (28 Mar 2025 01:45)    Hospital Course:  Pt started on CTX with improvement in leukocytosis and mental status. Ucx showing e coli pan-sensitive to all abx. Bcx negative at 48 hours. Pt continued on CTX for 5 days course for non-complicated cystitis. PT recs for home PT with wheelchair. Pt stable and ready for discharge once all home care set up.     On day of discharge, patient is clinically stable with no new exam findings or acute symptoms compared to prior. The patient was seen by the attending physician on the date of discharge and deemed stable and acceptable for discharge. The patient's chronic medical conditions were treated accordingly per the patient's home medication regimen. The patient's medication reconciliation, follow up appointments, discharge instructions, and significant lab and diagnostic studies are as noted.     Important Medication Changes and Reason:  - F/u PCP in 2 weeks     Active or Pending Issues Requiring Follow-up:  None    Advanced Directives:   [X] Full code  [ ] DNR  [ ] Hospice    Discharge Diagnoses:  UTI         HPI:  88 YO female with PMH of dementia AOx2, Afib on Xarelto, CHF, hypothyroidism, HTN, HLD, presents due to increased lethargy and change in cognitive function over to the past 2-3 weeks. Reports increased difficulty with ambulation. Per son, pt base line using a walker at home, but seems to be dragging her right leg more than usual. Pt endorse one episode of vomit yesterday, pain with urinary and urinary incontinence. Pt denies F/C, Chest pain, SOB, HA, abdominal pain, diarrhea or constipations.  (28 Mar 2025 01:45)    Hospital Course:  Pt started on CTX with improvement in leukocytosis and mental status. Ucx showing e coli pan-sensitive to all abx. Bcx negative at 48 hours. Pt continued on CTX for 5 days course for uncomplicated cystitis. PT recs for home PT with wheelchair. Pt stable and ready for discharge once all home care set up.     On day of discharge, patient is clinically stable with no new exam findings or acute symptoms compared to prior. The patient was seen by the attending physician on the date of discharge and deemed stable and acceptable for discharge. The patient's chronic medical conditions were treated accordingly per the patient's home medication regimen. The patient's medication reconciliation, follow up appointments, discharge instructions, and significant lab and diagnostic studies are as noted.       - F/u PCP in 2 weeks     Active or Pending Issues Requiring Follow-up:  None    Advanced Directives:   [X] Full code  [ ] DNR  [ ] Hospice    Discharge Diagnoses:  UTI

## 2025-03-30 LAB
-  AMOXICILLIN/CLAVULANIC ACID: SIGNIFICANT CHANGE UP
-  AMPICILLIN/SULBACTAM: SIGNIFICANT CHANGE UP
-  AMPICILLIN: SIGNIFICANT CHANGE UP
-  AZTREONAM: SIGNIFICANT CHANGE UP
-  CEFAZOLIN: SIGNIFICANT CHANGE UP
-  CEFEPIME: SIGNIFICANT CHANGE UP
-  CEFOXITIN: SIGNIFICANT CHANGE UP
-  CEFTRIAXONE: SIGNIFICANT CHANGE UP
-  CEFUROXIME: SIGNIFICANT CHANGE UP
-  CIPROFLOXACIN: SIGNIFICANT CHANGE UP
-  ERTAPENEM: SIGNIFICANT CHANGE UP
-  GENTAMICIN: SIGNIFICANT CHANGE UP
-  IMIPENEM: SIGNIFICANT CHANGE UP
-  LEVOFLOXACIN: SIGNIFICANT CHANGE UP
-  MEROPENEM: SIGNIFICANT CHANGE UP
-  NITROFURANTOIN: SIGNIFICANT CHANGE UP
-  PIPERACILLIN/TAZOBACTAM: SIGNIFICANT CHANGE UP
-  TOBRAMYCIN: SIGNIFICANT CHANGE UP
-  TRIMETHOPRIM/SULFAMETHOXAZOLE: SIGNIFICANT CHANGE UP
CULTURE RESULTS: ABNORMAL
METHOD TYPE: SIGNIFICANT CHANGE UP
ORGANISM # SPEC MICROSCOPIC CNT: ABNORMAL
ORGANISM # SPEC MICROSCOPIC CNT: ABNORMAL
SPECIMEN SOURCE: SIGNIFICANT CHANGE UP

## 2025-03-30 PROCEDURE — 99232 SBSQ HOSP IP/OBS MODERATE 35: CPT | Mod: GC

## 2025-03-30 RX ADMIN — RIVAROXABAN 20 MILLIGRAM(S): 10 TABLET, FILM COATED ORAL at 17:20

## 2025-03-30 RX ADMIN — Medication 500 MILLIGRAM(S): at 09:20

## 2025-03-30 RX ADMIN — Medication 75 MICROGRAM(S): at 06:04

## 2025-03-30 RX ADMIN — CEFTRIAXONE 100 MILLIGRAM(S): 500 INJECTION, POWDER, FOR SOLUTION INTRAMUSCULAR; INTRAVENOUS at 06:07

## 2025-03-30 RX ADMIN — Medication 2000 UNIT(S): at 09:20

## 2025-03-30 RX ADMIN — Medication 1 TABLET(S): at 09:20

## 2025-03-30 RX ADMIN — CARVEDILOL 3.12 MILLIGRAM(S): 3.12 TABLET, FILM COATED ORAL at 06:04

## 2025-03-30 RX ADMIN — Medication 25 MILLIGRAM(S): at 06:04

## 2025-03-30 RX ADMIN — CARVEDILOL 3.12 MILLIGRAM(S): 3.12 TABLET, FILM COATED ORAL at 17:20

## 2025-03-30 RX ADMIN — FOLIC ACID 1 MILLIGRAM(S): 1 TABLET ORAL at 09:20

## 2025-03-30 NOTE — PROGRESS NOTE ADULT - SUBJECTIVE AND OBJECTIVE BOX
DATE OF SERVICE: 03-30-25 @ 07:21    Patient is a 89y old  Female who presents with a chief complaint of weakness (29 Mar 2025 07:33)      SUBJECTIVE / OVERNIGHT EVENTS:  Overnight, no events.  Pt seen and examined at bedside. Denies any acute complaints. Feels well.     ROS negative except as above.    MEDICATIONS  (STANDING):  ascorbic acid 500 milliGRAM(s) Oral daily  carvedilol 3.125 milliGRAM(s) Oral every 12 hours  cholecalciferol 2000 Unit(s) Oral daily  folic acid 1 milliGRAM(s) Oral daily  levothyroxine 75 MICROGram(s) Oral daily  multivitamin 1 Tablet(s) Oral daily  rivaroxaban 20 milliGRAM(s) Oral with dinner  spironolactone 25 milliGRAM(s) Oral daily    MEDICATIONS  (PRN):  acetaminophen     Tablet .. 650 milliGRAM(s) Oral every 6 hours PRN Temp greater or equal to 38C (100.4F), Mild Pain (1 - 3)      Vital Signs Last 24 Hrs  T(C): 37.1 (30 Mar 2025 05:20), Max: 37.1 (30 Mar 2025 05:20)  T(F): 98.8 (30 Mar 2025 05:20), Max: 98.8 (30 Mar 2025 05:20)  HR: 87 (30 Mar 2025 05:20) (79 - 87)  BP: 134/84 (30 Mar 2025 05:20) (114/80 - 134/84)  BP(mean): --  RR: 18 (30 Mar 2025 05:20) (18 - 18)  SpO2: 94% (30 Mar 2025 05:20) (93% - 100%)    Parameters below as of 30 Mar 2025 05:20  Patient On (Oxygen Delivery Method): room air      CAPILLARY BLOOD GLUCOSE        I&O's Summary    29 Mar 2025 07:01  -  30 Mar 2025 07:00  --------------------------------------------------------  IN: 240 mL / OUT: 700 mL / NET: -460 mL        PHYSICAL EXAM:  GENERAL: NAD, well-developed  HEAD:  Atraumatic, Normocephalic  EYES: EOMI, conjunctiva and sclera clear  NECK: Supple, No JVD  CHEST/LUNG: Clear to auscultation bilaterally; No wheeze  HEART: Regular rate and rhythm; No murmurs, rubs, or gallops  ABDOMEN: Soft, Nontender, Nondistended; Bowel sounds present  EXTREMITIES:  2+ Peripheral Pulses, No clubbing, cyanosis, or edema  NEUROLOGY: AOx1-2, non-focal  SKIN: No rashes or lesions    LABS:                  MICRO:      RADIOLOGY & ADDITIONAL TESTS:

## 2025-03-31 ENCOUNTER — TRANSCRIPTION ENCOUNTER (OUTPATIENT)
Age: 89
End: 2025-03-31

## 2025-03-31 VITALS
RESPIRATION RATE: 18 BRPM | HEART RATE: 76 BPM | SYSTOLIC BLOOD PRESSURE: 110 MMHG | DIASTOLIC BLOOD PRESSURE: 68 MMHG | OXYGEN SATURATION: 94 % | TEMPERATURE: 99 F

## 2025-03-31 PROCEDURE — 81001 URINALYSIS AUTO W/SCOPE: CPT

## 2025-03-31 PROCEDURE — 97110 THERAPEUTIC EXERCISES: CPT

## 2025-03-31 PROCEDURE — 97161 PT EVAL LOW COMPLEX 20 MIN: CPT

## 2025-03-31 PROCEDURE — 84132 ASSAY OF SERUM POTASSIUM: CPT

## 2025-03-31 PROCEDURE — 83605 ASSAY OF LACTIC ACID: CPT

## 2025-03-31 PROCEDURE — 99232 SBSQ HOSP IP/OBS MODERATE 35: CPT | Mod: GC

## 2025-03-31 PROCEDURE — 86901 BLOOD TYPING SEROLOGIC RH(D): CPT

## 2025-03-31 PROCEDURE — 87637 SARSCOV2&INF A&B&RSV AMP PRB: CPT

## 2025-03-31 PROCEDURE — 86900 BLOOD TYPING SEROLOGIC ABO: CPT

## 2025-03-31 PROCEDURE — 85730 THROMBOPLASTIN TIME PARTIAL: CPT

## 2025-03-31 PROCEDURE — 87186 SC STD MICRODIL/AGAR DIL: CPT

## 2025-03-31 PROCEDURE — 87040 BLOOD CULTURE FOR BACTERIA: CPT

## 2025-03-31 PROCEDURE — 85025 COMPLETE CBC W/AUTO DIFF WBC: CPT

## 2025-03-31 PROCEDURE — 82435 ASSAY OF BLOOD CHLORIDE: CPT

## 2025-03-31 PROCEDURE — 84443 ASSAY THYROID STIM HORMONE: CPT

## 2025-03-31 PROCEDURE — 82803 BLOOD GASES ANY COMBINATION: CPT

## 2025-03-31 PROCEDURE — 85027 COMPLETE CBC AUTOMATED: CPT

## 2025-03-31 PROCEDURE — 84100 ASSAY OF PHOSPHORUS: CPT

## 2025-03-31 PROCEDURE — 86850 RBC ANTIBODY SCREEN: CPT

## 2025-03-31 PROCEDURE — 85610 PROTHROMBIN TIME: CPT

## 2025-03-31 PROCEDURE — 71045 X-RAY EXAM CHEST 1 VIEW: CPT

## 2025-03-31 PROCEDURE — 93005 ELECTROCARDIOGRAM TRACING: CPT

## 2025-03-31 PROCEDURE — 84295 ASSAY OF SERUM SODIUM: CPT

## 2025-03-31 PROCEDURE — 85014 HEMATOCRIT: CPT

## 2025-03-31 PROCEDURE — 87086 URINE CULTURE/COLONY COUNT: CPT

## 2025-03-31 PROCEDURE — 96374 THER/PROPH/DIAG INJ IV PUSH: CPT

## 2025-03-31 PROCEDURE — 82947 ASSAY GLUCOSE BLOOD QUANT: CPT

## 2025-03-31 PROCEDURE — 85018 HEMOGLOBIN: CPT

## 2025-03-31 PROCEDURE — 83735 ASSAY OF MAGNESIUM: CPT

## 2025-03-31 PROCEDURE — 80053 COMPREHEN METABOLIC PANEL: CPT

## 2025-03-31 PROCEDURE — 99285 EMERGENCY DEPT VISIT HI MDM: CPT

## 2025-03-31 PROCEDURE — 96375 TX/PRO/DX INJ NEW DRUG ADDON: CPT

## 2025-03-31 PROCEDURE — 97530 THERAPEUTIC ACTIVITIES: CPT

## 2025-03-31 PROCEDURE — 82330 ASSAY OF CALCIUM: CPT

## 2025-03-31 PROCEDURE — 70450 CT HEAD/BRAIN W/O DYE: CPT | Mod: MC

## 2025-03-31 RX ADMIN — Medication 2000 UNIT(S): at 09:08

## 2025-03-31 RX ADMIN — Medication 75 MICROGRAM(S): at 05:09

## 2025-03-31 RX ADMIN — CARVEDILOL 3.12 MILLIGRAM(S): 3.12 TABLET, FILM COATED ORAL at 05:09

## 2025-03-31 RX ADMIN — FOLIC ACID 1 MILLIGRAM(S): 1 TABLET ORAL at 09:07

## 2025-03-31 RX ADMIN — Medication 25 MILLIGRAM(S): at 05:09

## 2025-03-31 RX ADMIN — Medication 1 TABLET(S): at 09:07

## 2025-03-31 RX ADMIN — Medication 500 MILLIGRAM(S): at 09:07

## 2025-03-31 NOTE — PROGRESS NOTE ADULT - PROBLEM SELECTOR PLAN 2
c/w Carvedilol 3.125 and Xarelto 20

## 2025-03-31 NOTE — PROGRESS NOTE ADULT - PROBLEM SELECTOR PLAN 5
regular diet  heparin sub Q  PT consult

## 2025-03-31 NOTE — PROGRESS NOTE ADULT - PROBLEM SELECTOR PLAN 4
c/w levothyroxine 75mcg  f/u TSH
Stable.

## 2025-03-31 NOTE — PROGRESS NOTE ADULT - ATTENDING COMMENTS
Patient seen and examined at bedside. No acute events overnight. At baseline. Finished antibiotics. UCx with pansensitive E. Coli. DC planning (family wants [facility])
Patient seen and examined at bedside. No acute events overnight. Likely a simple cystitis. Seems to be at baseline cognitively now. PT consult. Follow up cultures. DC planning.
Patient seen and examined at bedside. No acute events overnight. Medically ready for [facility]. Finished antibiotics
Patient seen and examined at bedside. No acute events overnight. Urine culture with 100K CFU E. Coli. She herself has no complaints. She seems to be at baseline. I will follow up urine culture. Son wants [facility] (PT rec HPT). We are working with CM on disposition planning,

## 2025-03-31 NOTE — PROGRESS NOTE ADULT - ASSESSMENT
88 YO female with PMH of dementia AOx2, Afib on Xarelto, CHF, hypothyroidism, HTN, HLD, presented to the ED, due to increased lethargy and weakness for 2 week, was found to have a UTI 88 YO female with PMH of dementia AOx2, Afib on Xarelto, CHF, hypothyroidism, HTN, HLD, presented to the ED, due to increased lethargy and weakness for 2 week, found to have a UTI s/p treatment now pending dc to [facility].

## 2025-03-31 NOTE — DISCHARGE NOTE NURSING/CASE MANAGEMENT/SOCIAL WORK - FINANCIAL ASSISTANCE
[facility] provides services at a reduced cost to those who are determined to be eligible through [facility]’s financial assistance program. Information regarding [facility]’s financial assistance program can be found by going to [email or web address] or by calling [phone].

## 2025-03-31 NOTE — DISCHARGE NOTE NURSING/CASE MANAGEMENT/SOCIAL WORK - PATIENT PORTAL LINK FT
You can access the FollowMyHealth Patient Portal offered by [facility] by registering at the following website: [email or web address]. By joining [facility]’s FollowMyHealth portal, you will also be able to view your health information using other applications (apps) compatible with our system.

## 2025-03-31 NOTE — PROGRESS NOTE ADULT - SUBJECTIVE AND OBJECTIVE BOX
Internal Medicine Progress Note    Patient is a 89y old  Female who presents with a chief complaint of weakness (30 Mar 2025 07:20)    OVERNIGHT EVENTS/SUBJECTIVE:    OBJECTIVE:  Vital Signs Last 24 Hrs  T(C): 36.7 (31 Mar 2025 04:23), Max: 37 (30 Mar 2025 12:05)  T(F): 98.1 (31 Mar 2025 04:23), Max: 98.6 (30 Mar 2025 12:05)  HR: 76 (31 Mar 2025 04:23) (67 - 80)  BP: 142/79 (31 Mar 2025 04:23) (112/75 - 142/79)  BP(mean): --  RR: 18 (31 Mar 2025 04:23) (18 - 18)  SpO2: 96% (31 Mar 2025 04:23) (93% - 96%)    Parameters below as of 31 Mar 2025 04:23  Patient On (Oxygen Delivery Method): room air      I&O's Detail    30 Mar 2025 07:01  -  31 Mar 2025 07:00  --------------------------------------------------------  IN:  Total IN: 0 mL    OUT:    Voided (mL): 700 mL  Total OUT: 700 mL    Total NET: -700 mL        Daily     Daily   Physical Exam:  General: NAD, resting comfortably in bed  Neuro: A&Ox4, 5/5 strength in all ext  HEENT: NC/AT, EOMI, normal hearing, oral mucosa moist, no oral lesions noted, no pharyngeal erythema, uvula midline  Neck: supple, thyroid not enlarged, no LAD  Resp: Breathing comfortably on RA, LCTA b/l  CV: Normal sinus rhythm, S1 and S2, no r/m/g  Abd: soft, non-distended, non-tender. No HSM.  Ext: ROMIx4, no edema, +2 pulses bilaterally  Skin: Warm and dry, no rashes or discolorations  Psych: Appropriate affect    Medications:  MEDICATIONS  (STANDING):  ascorbic acid 500 milliGRAM(s) Oral daily  carvedilol 3.125 milliGRAM(s) Oral every 12 hours  cholecalciferol 2000 Unit(s) Oral daily  folic acid 1 milliGRAM(s) Oral daily  levothyroxine 75 MICROGram(s) Oral daily  multivitamin 1 Tablet(s) Oral daily  rivaroxaban 20 milliGRAM(s) Oral with dinner  spironolactone 25 milliGRAM(s) Oral daily    MEDICATIONS  (PRN):  acetaminophen     Tablet .. 650 milliGRAM(s) Oral every 6 hours PRN Temp greater or equal to 38C (100.4F), Mild Pain (1 - 3)      Labs:                  Radiology: Internal Medicine Progress Note    Patient is a 89y old  Female who presents with a chief complaint of weakness (30 Mar 2025 07:20)    OVERNIGHT EVENTS/SUBJECTIVE: No overnight events. Pt feels well today with no complaints. Denies fever, abd pain, urinary sxs, SOB, HA.     OBJECTIVE:  Vital Signs Last 24 Hrs  T(C): 36.7 (31 Mar 2025 04:23), Max: 37 (30 Mar 2025 12:05)  T(F): 98.1 (31 Mar 2025 04:23), Max: 98.6 (30 Mar 2025 12:05)  HR: 76 (31 Mar 2025 04:23) (67 - 80)  BP: 142/79 (31 Mar 2025 04:23) (112/75 - 142/79)  BP(mean): --  RR: 18 (31 Mar 2025 04:23) (18 - 18)  SpO2: 96% (31 Mar 2025 04:23) (93% - 96%)    Parameters below as of 31 Mar 2025 04:23  Patient On (Oxygen Delivery Method): room air      I&O's Detail    30 Mar 2025 07:01  -  31 Mar 2025 07:00  --------------------------------------------------------  IN:  Total IN: 0 mL    OUT:    Voided (mL): 700 mL  Total OUT: 700 mL    Total NET: -700 mL        Daily     Daily   Physical Exam:  General: NAD, resting comfortably in bed  Neuro: A&Ox2  HEENT: NC/AT, EOMI, normal hearing, oral mucosa moist  Resp: Breathing comfortably on RA, LCTA b/l  CV: Normal sinus rhythm, S1 and S2, no r/m/g  Abd: soft, non-distended, non-tender. No HSM.  Ext: no edema, +2 pulses bilaterally  Skin: Warm and dry, no rashes or discolorations  Psych: Appropriate affect    Medications:  MEDICATIONS  (STANDING):  ascorbic acid 500 milliGRAM(s) Oral daily  carvedilol 3.125 milliGRAM(s) Oral every 12 hours  cholecalciferol 2000 Unit(s) Oral daily  folic acid 1 milliGRAM(s) Oral daily  levothyroxine 75 MICROGram(s) Oral daily  multivitamin 1 Tablet(s) Oral daily  rivaroxaban 20 milliGRAM(s) Oral with dinner  spironolactone 25 milliGRAM(s) Oral daily    MEDICATIONS  (PRN):  acetaminophen     Tablet .. 650 milliGRAM(s) Oral every 6 hours PRN Temp greater or equal to 38C (100.4F), Mild Pain (1 - 3)      Labs:                  Radiology:

## 2025-04-08 NOTE — ED ADULT NURSE NOTE - IS THE PATIENT ABLE TO BE SCREENED?
Care of the Biopsy Site:    Keep biopsy site clean and dry for 24 hours.    After the first day, the biopsy site can be cleaned with soap and water 1-2 times daily.     Apply a thin coating of ointment (Vaseline) and keep site covered with a Band Aid.    Continue wound care until.    Shave biopsy site is completely healed.    Check site each day for redness, tenderness, swelling, drainage, excessive bleeding. Call dermatology clinic if any of these symptoms appear.       Hand hydrocortisone twice daily for itching, for week or so, and use hand cream , curel or no crack as needed, ( no crack maybe at night)  Vulvar use calmoseptine nightly or when irritated, may use with clobetasol  Buttocks and thigh like callus , early bed sore, can use egg crate; at night may use no crack hand cream or curel at night; monitor , do not want ulcer  Chest, under breast small amount hydrocortisone twice daily for itching,       
No

## 2025-04-17 NOTE — PATIENT PROFILE ADULT - ...
Assessment   Diagnoses and all orders for this visit:  Paronychia of finger of right hand  -     cephalexin (KEFLEX) 500 MG capsule; Take 1 capsule by mouth in the morning and 1 capsule at noon and 1 capsule in the evening. Do all this for 7 days.    For your finger, start cephalexin 500 mg po three times daily for 7 days.   Also soak the finger in warm soapy water for 15 minutes 2-3 times daily.   
04-Jan-2024 20:14:22